# Patient Record
Sex: MALE | Race: WHITE | Employment: OTHER | URBAN - METROPOLITAN AREA
[De-identification: names, ages, dates, MRNs, and addresses within clinical notes are randomized per-mention and may not be internally consistent; named-entity substitution may affect disease eponyms.]

---

## 2017-04-19 ENCOUNTER — OFFICE VISIT (OUTPATIENT)
Dept: FAMILY MEDICINE CLINIC | Age: 72
End: 2017-04-19

## 2017-04-19 VITALS
SYSTOLIC BLOOD PRESSURE: 122 MMHG | HEART RATE: 68 BPM | WEIGHT: 209.5 LBS | BODY MASS INDEX: 29.33 KG/M2 | OXYGEN SATURATION: 94 % | DIASTOLIC BLOOD PRESSURE: 70 MMHG | HEIGHT: 71 IN | RESPIRATION RATE: 12 BRPM | TEMPERATURE: 97.9 F

## 2017-04-19 DIAGNOSIS — S05.02XA CORNEA ABRASION, LEFT, INITIAL ENCOUNTER: Primary | ICD-10-CM

## 2017-04-19 PROCEDURE — 99202 OFFICE O/P NEW SF 15 MIN: CPT | Performed by: NURSE PRACTITIONER

## 2017-04-19 PROCEDURE — G8598 ASA/ANTIPLAT THER USED: HCPCS | Performed by: NURSE PRACTITIONER

## 2017-04-19 PROCEDURE — 3017F COLORECTAL CA SCREEN DOC REV: CPT | Performed by: NURSE PRACTITIONER

## 2017-04-19 PROCEDURE — G8420 CALC BMI NORM PARAMETERS: HCPCS | Performed by: NURSE PRACTITIONER

## 2017-04-19 PROCEDURE — 4004F PT TOBACCO SCREEN RCVD TLK: CPT | Performed by: NURSE PRACTITIONER

## 2017-04-19 PROCEDURE — 4040F PNEUMOC VAC/ADMIN/RCVD: CPT | Performed by: NURSE PRACTITIONER

## 2017-04-19 PROCEDURE — G8427 DOCREV CUR MEDS BY ELIG CLIN: HCPCS | Performed by: NURSE PRACTITIONER

## 2017-04-19 PROCEDURE — 1123F ACP DISCUSS/DSCN MKR DOCD: CPT | Performed by: NURSE PRACTITIONER

## 2017-04-19 RX ORDER — POLYMYXIN B SULFATE AND TRIMETHOPRIM 1; 10000 MG/ML; [USP'U]/ML
1 SOLUTION OPHTHALMIC EVERY 4 HOURS
Qty: 1 BOTTLE | Refills: 0 | Status: SHIPPED | OUTPATIENT
Start: 2017-04-19 | End: 2017-04-26

## 2017-04-22 ASSESSMENT — ENCOUNTER SYMPTOMS
SHORTNESS OF BREATH: 0
EYE ITCHING: 0
EYE DISCHARGE: 0
EYE REDNESS: 1
EYE PAIN: 1
FOREIGN BODY SENSATION: 1
VOMITING: 0
BLURRED VISION: 0
NAUSEA: 0
DOUBLE VISION: 0
PHOTOPHOBIA: 0
WHEEZING: 0
COUGH: 0

## 2020-11-03 PROBLEM — E78.5 HYPERLIPIDEMIA: Status: RESOLVED | Noted: 2020-11-03 | Resolved: 2020-11-03

## 2023-01-16 ENCOUNTER — HOSPITAL ENCOUNTER (OUTPATIENT)
Age: 78
Setting detail: OBSERVATION
Discharge: HOME | End: 2023-01-18
Attending: INTERNAL MEDICINE | Admitting: INTERNAL MEDICINE
Payer: OTHER GOVERNMENT

## 2023-01-16 ENCOUNTER — APPOINTMENT (OUTPATIENT)
Dept: GENERAL RADIOLOGY | Age: 78
End: 2023-01-16
Payer: OTHER GOVERNMENT

## 2023-01-16 DIAGNOSIS — R09.02 HYPOXEMIA: ICD-10-CM

## 2023-01-16 DIAGNOSIS — J10.1 INFLUENZA A: ICD-10-CM

## 2023-01-16 DIAGNOSIS — I50.9 ACUTE ON CHRONIC CONGESTIVE HEART FAILURE, UNSPECIFIED HEART FAILURE TYPE (HCC): Primary | ICD-10-CM

## 2023-01-16 PROBLEM — J11.1 INFLUENZA: Status: ACTIVE | Noted: 2023-01-16

## 2023-01-16 LAB
ALBUMIN SERPL-MCNC: 3.4 G/DL (ref 3.5–4.6)
ALP BLD-CCNC: 52 U/L (ref 35–104)
ALT SERPL-CCNC: 8 U/L (ref 0–41)
ANION GAP SERPL CALCULATED.3IONS-SCNC: 7 MEQ/L (ref 9–15)
AST SERPL-CCNC: 16 U/L (ref 0–40)
BACTERIA: NEGATIVE /HPF
BASOPHILS ABSOLUTE: 0 K/UL (ref 0–0.1)
BASOPHILS RELATIVE PERCENT: 0.5 % (ref 0.2–1.2)
BILIRUB SERPL-MCNC: 0.5 MG/DL (ref 0.2–0.7)
BILIRUBIN URINE: ABNORMAL
BLOOD, URINE: NEGATIVE
BUN BLDV-MCNC: 12 MG/DL (ref 8–23)
CALCIUM SERPL-MCNC: 9.9 MG/DL (ref 8.5–9.9)
CHLORIDE BLD-SCNC: 103 MEQ/L (ref 95–107)
CLARITY: CLEAR
CO2: 29 MEQ/L (ref 20–31)
COLOR: YELLOW
CREAT SERPL-MCNC: 1.07 MG/DL (ref 0.7–1.2)
EOSINOPHILS ABSOLUTE: 0 K/UL (ref 0–0.5)
EOSINOPHILS RELATIVE PERCENT: 0.7 % (ref 0.8–7)
EPITHELIAL CELLS, UA: NORMAL /HPF
GFR SERPL CREATININE-BSD FRML MDRD: >60 ML/MIN/{1.73_M2}
GLOBULIN: 2.8 G/DL (ref 2.3–3.5)
GLUCOSE BLD-MCNC: 100 MG/DL (ref 70–99)
GLUCOSE URINE: 500 MG/DL
HCT VFR BLD CALC: 42.1 % (ref 42–52)
HEMOGLOBIN: 14.2 G/DL (ref 13.7–17.5)
IMMATURE GRANULOCYTES #: 0 K/UL
IMMATURE GRANULOCYTES %: 0.2 %
INFLUENZA A BY PCR: POSITIVE
INFLUENZA B BY PCR: NEGATIVE
KETONES, URINE: ABNORMAL MG/DL
LEUKOCYTE ESTERASE, URINE: NEGATIVE
LYMPHOCYTES ABSOLUTE: 1.2 K/UL (ref 1.3–3.6)
LYMPHOCYTES RELATIVE PERCENT: 20.8 %
MAGNESIUM: 2 MG/DL (ref 1.7–2.4)
MCH RBC QN AUTO: 31.9 PG (ref 25.7–32.2)
MCHC RBC AUTO-ENTMCNC: 33.7 % (ref 32.3–36.5)
MCV RBC AUTO: 94.6 FL (ref 79–92.2)
MONOCYTES ABSOLUTE: 0.3 K/UL (ref 0.3–0.8)
MONOCYTES RELATIVE PERCENT: 5.6 % (ref 5.3–12.2)
NEUTROPHILS ABSOLUTE: 4.2 K/UL (ref 1.8–5.4)
NEUTROPHILS RELATIVE PERCENT: 72.2 % (ref 34–67.9)
NITRITE, URINE: NEGATIVE
PDW BLD-RTO: 14.6 % (ref 11.6–14.4)
PH UA: 6 (ref 5–9)
PLATELET # BLD: 173 K/UL (ref 163–337)
POTASSIUM REFLEX MAGNESIUM: 3.5 MEQ/L (ref 3.4–4.9)
PRO-BNP: 3190 PG/ML
PROCALCITONIN: 0.06 NG/ML (ref 0–0.15)
PROTEIN UA: 30 MG/DL
RBC # BLD: 4.45 M/UL (ref 4.63–6.08)
RBC UA: NORMAL /HPF (ref 0–2)
SARS-COV-2, NAAT: NOT DETECTED
SODIUM BLD-SCNC: 139 MEQ/L (ref 135–144)
SPECIFIC GRAVITY UA: 1.01 (ref 1–1.03)
TOTAL PROTEIN: 6.2 G/DL (ref 6.3–8)
TROPONIN: <0.01 NG/ML (ref 0–0.01)
URINE REFLEX TO CULTURE: ABNORMAL
UROBILINOGEN, URINE: 2 E.U./DL
WBC # BLD: 5.9 K/UL (ref 4.2–9)
WBC UA: NORMAL /HPF (ref 0–5)

## 2023-01-16 PROCEDURE — 96375 TX/PRO/DX INJ NEW DRUG ADDON: CPT

## 2023-01-16 PROCEDURE — 83735 ASSAY OF MAGNESIUM: CPT

## 2023-01-16 PROCEDURE — 87635 SARS-COV-2 COVID-19 AMP PRB: CPT

## 2023-01-16 PROCEDURE — 36415 COLL VENOUS BLD VENIPUNCTURE: CPT

## 2023-01-16 PROCEDURE — 84145 PROCALCITONIN (PCT): CPT

## 2023-01-16 PROCEDURE — 6360000002 HC RX W HCPCS

## 2023-01-16 PROCEDURE — 87502 INFLUENZA DNA AMP PROBE: CPT

## 2023-01-16 PROCEDURE — 2580000003 HC RX 258

## 2023-01-16 PROCEDURE — 99285 EMERGENCY DEPT VISIT HI MDM: CPT

## 2023-01-16 PROCEDURE — G0378 HOSPITAL OBSERVATION PER HR: HCPCS

## 2023-01-16 PROCEDURE — 96365 THER/PROPH/DIAG IV INF INIT: CPT

## 2023-01-16 PROCEDURE — 71046 X-RAY EXAM CHEST 2 VIEWS: CPT

## 2023-01-16 PROCEDURE — 85025 COMPLETE CBC W/AUTO DIFF WBC: CPT

## 2023-01-16 PROCEDURE — 80053 COMPREHEN METABOLIC PANEL: CPT

## 2023-01-16 PROCEDURE — 2580000003 HC RX 258: Performed by: INTERNAL MEDICINE

## 2023-01-16 PROCEDURE — 6370000000 HC RX 637 (ALT 250 FOR IP): Performed by: INTERNAL MEDICINE

## 2023-01-16 PROCEDURE — 83880 ASSAY OF NATRIURETIC PEPTIDE: CPT

## 2023-01-16 PROCEDURE — 96361 HYDRATE IV INFUSION ADD-ON: CPT

## 2023-01-16 PROCEDURE — 6360000002 HC RX W HCPCS: Performed by: INTERNAL MEDICINE

## 2023-01-16 PROCEDURE — 81001 URINALYSIS AUTO W/SCOPE: CPT

## 2023-01-16 PROCEDURE — 93005 ELECTROCARDIOGRAM TRACING: CPT

## 2023-01-16 PROCEDURE — 84484 ASSAY OF TROPONIN QUANT: CPT

## 2023-01-16 PROCEDURE — 87040 BLOOD CULTURE FOR BACTERIA: CPT

## 2023-01-16 RX ORDER — POLYETHYLENE GLYCOL 3350 17 G/17G
17 POWDER, FOR SOLUTION ORAL DAILY PRN
Status: DISCONTINUED | OUTPATIENT
Start: 2023-01-16 | End: 2023-01-18 | Stop reason: HOSPADM

## 2023-01-16 RX ORDER — OSELTAMIVIR PHOSPHATE 75 MG/1
75 CAPSULE ORAL ONCE
Status: DISCONTINUED | OUTPATIENT
Start: 2023-01-16 | End: 2023-01-16

## 2023-01-16 RX ORDER — SODIUM CHLORIDE 9 MG/ML
INJECTION, SOLUTION INTRAVENOUS PRN
Status: DISCONTINUED | OUTPATIENT
Start: 2023-01-16 | End: 2023-01-18 | Stop reason: HOSPADM

## 2023-01-16 RX ORDER — SODIUM CHLORIDE 0.9 % (FLUSH) 0.9 %
10 SYRINGE (ML) INJECTION PRN
Status: DISCONTINUED | OUTPATIENT
Start: 2023-01-16 | End: 2023-01-18 | Stop reason: HOSPADM

## 2023-01-16 RX ORDER — GUAIFENESIN 600 MG/1
600 TABLET, EXTENDED RELEASE ORAL 2 TIMES DAILY
Status: DISCONTINUED | OUTPATIENT
Start: 2023-01-16 | End: 2023-01-18 | Stop reason: HOSPADM

## 2023-01-16 RX ORDER — ASPIRIN 81 MG/1
81 TABLET ORAL DAILY
Status: DISCONTINUED | OUTPATIENT
Start: 2023-01-17 | End: 2023-01-18 | Stop reason: HOSPADM

## 2023-01-16 RX ORDER — FUROSEMIDE 20 MG/1
20 TABLET ORAL DAILY
COMMUNITY

## 2023-01-16 RX ORDER — ATORVASTATIN CALCIUM 40 MG/1
40 TABLET, FILM COATED ORAL
Status: DISCONTINUED | OUTPATIENT
Start: 2023-01-16 | End: 2023-01-18 | Stop reason: HOSPADM

## 2023-01-16 RX ORDER — INSULIN LISPRO 100 [IU]/ML
0-8 INJECTION, SOLUTION INTRAVENOUS; SUBCUTANEOUS
Status: DISCONTINUED | OUTPATIENT
Start: 2023-01-17 | End: 2023-01-18 | Stop reason: HOSPADM

## 2023-01-16 RX ORDER — PANTOPRAZOLE SODIUM 40 MG/1
40 TABLET, DELAYED RELEASE ORAL
Status: DISCONTINUED | OUTPATIENT
Start: 2023-01-17 | End: 2023-01-18 | Stop reason: HOSPADM

## 2023-01-16 RX ORDER — ACETAMINOPHEN 160 MG
TABLET,DISINTEGRATING ORAL
COMMUNITY

## 2023-01-16 RX ORDER — ALBUTEROL SULFATE 90 UG/1
INHALANT RESPIRATORY (INHALATION)
COMMUNITY

## 2023-01-16 RX ORDER — IPRATROPIUM BROMIDE AND ALBUTEROL SULFATE 2.5; .5 MG/3ML; MG/3ML
1 SOLUTION RESPIRATORY (INHALATION) 3 TIMES DAILY
Status: DISCONTINUED | OUTPATIENT
Start: 2023-01-16 | End: 2023-01-16

## 2023-01-16 RX ORDER — FUROSEMIDE 10 MG/ML
20 INJECTION INTRAMUSCULAR; INTRAVENOUS ONCE
Status: COMPLETED | OUTPATIENT
Start: 2023-01-16 | End: 2023-01-16

## 2023-01-16 RX ORDER — SPIRONOLACTONE 25 MG/1
25 TABLET ORAL DAILY
Status: DISCONTINUED | OUTPATIENT
Start: 2023-01-17 | End: 2023-01-18 | Stop reason: HOSPADM

## 2023-01-16 RX ORDER — ACETAMINOPHEN 325 MG/1
650 TABLET ORAL EVERY 6 HOURS PRN
Status: DISCONTINUED | OUTPATIENT
Start: 2023-01-16 | End: 2023-01-18 | Stop reason: HOSPADM

## 2023-01-16 RX ORDER — ENOXAPARIN SODIUM 100 MG/ML
40 INJECTION SUBCUTANEOUS DAILY
Status: DISCONTINUED | OUTPATIENT
Start: 2023-01-17 | End: 2023-01-17

## 2023-01-16 RX ORDER — SPIRONOLACTONE 25 MG/1
25 TABLET ORAL DAILY
COMMUNITY

## 2023-01-16 RX ORDER — ACETAMINOPHEN 650 MG/1
650 SUPPOSITORY RECTAL EVERY 6 HOURS PRN
Status: DISCONTINUED | OUTPATIENT
Start: 2023-01-16 | End: 2023-01-18 | Stop reason: HOSPADM

## 2023-01-16 RX ORDER — DEXTROSE MONOHYDRATE 100 MG/ML
INJECTION, SOLUTION INTRAVENOUS CONTINUOUS PRN
Status: DISCONTINUED | OUTPATIENT
Start: 2023-01-16 | End: 2023-01-18 | Stop reason: HOSPADM

## 2023-01-16 RX ORDER — ATORVASTATIN CALCIUM 40 MG/1
40 TABLET, FILM COATED ORAL DAILY
COMMUNITY

## 2023-01-16 RX ORDER — CARVEDILOL 25 MG/1
25 TABLET ORAL 2 TIMES DAILY WITH MEALS
Status: ON HOLD | COMMUNITY
End: 2023-01-18 | Stop reason: SDUPTHER

## 2023-01-16 RX ORDER — ALENDRONATE SODIUM 70 MG/1
70 TABLET ORAL
COMMUNITY

## 2023-01-16 RX ORDER — IPRATROPIUM BROMIDE AND ALBUTEROL SULFATE 2.5; .5 MG/3ML; MG/3ML
1 SOLUTION RESPIRATORY (INHALATION) 3 TIMES DAILY
Status: DISCONTINUED | OUTPATIENT
Start: 2023-01-16 | End: 2023-01-18 | Stop reason: HOSPADM

## 2023-01-16 RX ORDER — 0.9 % SODIUM CHLORIDE 0.9 %
1000 INTRAVENOUS SOLUTION INTRAVENOUS ONCE
Status: DISCONTINUED | OUTPATIENT
Start: 2023-01-16 | End: 2023-01-16

## 2023-01-16 RX ORDER — SILDENAFIL 100 MG/1
100 TABLET, FILM COATED ORAL PRN
COMMUNITY

## 2023-01-16 RX ORDER — INSULIN LISPRO 100 [IU]/ML
0-4 INJECTION, SOLUTION INTRAVENOUS; SUBCUTANEOUS NIGHTLY
Status: DISCONTINUED | OUTPATIENT
Start: 2023-01-16 | End: 2023-01-18 | Stop reason: HOSPADM

## 2023-01-16 RX ORDER — ONDANSETRON 2 MG/ML
4 INJECTION INTRAMUSCULAR; INTRAVENOUS EVERY 6 HOURS PRN
Status: DISCONTINUED | OUTPATIENT
Start: 2023-01-16 | End: 2023-01-18 | Stop reason: HOSPADM

## 2023-01-16 RX ORDER — OSELTAMIVIR PHOSPHATE 75 MG/1
75 CAPSULE ORAL 2 TIMES DAILY
Status: DISCONTINUED | OUTPATIENT
Start: 2023-01-16 | End: 2023-01-18 | Stop reason: HOSPADM

## 2023-01-16 RX ORDER — METHYLPREDNISOLONE SODIUM SUCCINATE 40 MG/ML
40 INJECTION, POWDER, LYOPHILIZED, FOR SOLUTION INTRAMUSCULAR; INTRAVENOUS EVERY 12 HOURS
Status: DISCONTINUED | OUTPATIENT
Start: 2023-01-16 | End: 2023-01-18 | Stop reason: HOSPADM

## 2023-01-16 RX ORDER — ASPIRIN 81 MG/1
81 TABLET ORAL DAILY
COMMUNITY

## 2023-01-16 RX ORDER — PROMETHAZINE HYDROCHLORIDE 12.5 MG/1
12.5 TABLET ORAL EVERY 6 HOURS PRN
Status: DISCONTINUED | OUTPATIENT
Start: 2023-01-16 | End: 2023-01-18 | Stop reason: HOSPADM

## 2023-01-16 RX ORDER — SODIUM CHLORIDE 0.9 % (FLUSH) 0.9 %
10 SYRINGE (ML) INJECTION EVERY 12 HOURS SCHEDULED
Status: DISCONTINUED | OUTPATIENT
Start: 2023-01-16 | End: 2023-01-18 | Stop reason: HOSPADM

## 2023-01-16 RX ORDER — CARVEDILOL 12.5 MG/1
12.5 TABLET ORAL 2 TIMES DAILY WITH MEALS
Status: DISCONTINUED | OUTPATIENT
Start: 2023-01-17 | End: 2023-01-18 | Stop reason: HOSPADM

## 2023-01-16 RX ADMIN — OSELTAMIVIR PHOSPHATE 75 MG: 75 CAPSULE ORAL at 22:18

## 2023-01-16 RX ADMIN — SODIUM CHLORIDE, PRESERVATIVE FREE 10 ML: 5 INJECTION INTRAVENOUS at 22:18

## 2023-01-16 RX ADMIN — SODIUM CHLORIDE 1000 ML: 9 INJECTION, SOLUTION INTRAVENOUS at 18:30

## 2023-01-16 RX ADMIN — ATORVASTATIN CALCIUM 40 MG: 40 TABLET, FILM COATED ORAL at 22:18

## 2023-01-16 RX ADMIN — METHYLPREDNISOLONE SODIUM SUCCINATE 40 MG: 40 INJECTION, POWDER, FOR SOLUTION INTRAMUSCULAR; INTRAVENOUS at 22:18

## 2023-01-16 RX ADMIN — CEFTRIAXONE 1000 MG: 1 INJECTION, POWDER, FOR SOLUTION INTRAMUSCULAR; INTRAVENOUS at 20:26

## 2023-01-16 RX ADMIN — GUAIFENESIN 600 MG: 600 TABLET, EXTENDED RELEASE ORAL at 22:18

## 2023-01-16 RX ADMIN — FUROSEMIDE 20 MG: 10 INJECTION, SOLUTION INTRAMUSCULAR; INTRAVENOUS at 20:25

## 2023-01-16 ASSESSMENT — ENCOUNTER SYMPTOMS
CHOKING: 0
DIARRHEA: 0
COUGH: 0
NAUSEA: 0
ABDOMINAL PAIN: 0
SHORTNESS OF BREATH: 0
ABDOMINAL DISTENTION: 0
SORE THROAT: 0
RHINORRHEA: 0
EYES NEGATIVE: 1
VOMITING: 0

## 2023-01-16 ASSESSMENT — PAIN DESCRIPTION - PAIN TYPE: TYPE: ACUTE PAIN

## 2023-01-16 ASSESSMENT — PAIN - FUNCTIONAL ASSESSMENT: PAIN_FUNCTIONAL_ASSESSMENT: NONE - DENIES PAIN

## 2023-01-16 NOTE — ED PROVIDER NOTES
[facility] ED  eMERGENCYdEPARTMENT eNCOUnter      Pt Name: Jimenez French  MRN: [id number]  [place] 1945  Date of evaluation: 1/16/2023  Ulla Bosworth, PA-C    CHIEF COMPLAINT           HPI  Jimenez French is a 70 y.o. male per chart review has a h/o MI s/p CABG, COPD, smoker, CAD, HTN presents to the ED with concerns for increased fatigue and worsening cough x1 week. Patient is alert and oriented however wife helps give history. She states they went to [place] last week, on a plane, he was sick while there with cough, nasal congestion. Started to improve however when he got back he has worsened. She states he is sleeping all day, lacking an appetite. States today he thought people were in the house when they were not. He denies any concerning symptoms, states his family is too worried about him. States his cough is at his baseline due to his COPD. Never on any O2 at home. He denies any dysuria, changes in bladder or bowel movements, abdominal pain, flank pain, headache, vision changes, weakness or dizziness. ROS  Review of Systems   Constitutional:  Positive for activity change, appetite change and fatigue. Negative for chills and fever. HENT:  Negative for congestion, rhinorrhea, sneezing and sore throat. Eyes: Negative. Respiratory:  Negative for cough, choking and shortness of breath. Cardiovascular:  Negative for chest pain. Gastrointestinal:  Negative for abdominal distention, abdominal pain, diarrhea, nausea and vomiting. Endocrine: Negative. Genitourinary:  Negative for dysuria, flank pain, hematuria and urgency. Musculoskeletal: Negative. Skin: Negative. Neurological:  Negative for tremors, syncope, facial asymmetry, speech difficulty, weakness, light-headedness, numbness and headaches. Hematological: Negative. Except as noted above the remainder of the review of systems was reviewed and negative.        PAST MEDICAL HISTORY     Past Medical History: Diagnosis Date    Acute transmural MI ([facility] [address] 3.1.07    MRI, basal, mid and distal inferior w/inferoseptal wall involvement extending to the rt ventricular posterior wall.h/o cardiogenic shock. Anginal pain (HCC)     controlled w/beta blockers and Ranexa    ASHD (arteriosclerotic heart disease)     class II    CAD (coronary artery disease)     Cardiogenic shock (HCC)     Carotid artery disease (HCC)     Carotid artery stenosis     Carotid bruit     COPD (chronic obstructive pulmonary disease) (HCC)     Dyslipidemia     H/O agent [place] exposure     as reported by the [place]    H/O pericarditis     H/O thrombocytopenia     Hyperlipidemia     Hypertension     Ischemic cardiomyopathy     Left bundle branch block     chronic,    MI (myocardial infarction) ([facility] [address]     remote    Other dyspnea and respiratory abnormality     Pericarditis     Renal failure          SURGICAL HISTORY       Past Surgical History:   Procedure Laterality Date    ANGIOPLASTY      w/occluded distal vessels and stent placement    CARDIAC CATHETERIZATION      emergent, 100 occluded distal RCA w/ unsuccessful PTCA. Ptca w/BMS x 2 to the MID RCA and PTCA to the PDA w/BMS x 12. 1.07    SKIN BIOPSY Left 04/14/2017         CURRENTMEDICATIONS       Previous Medications    ALBUTEROL (PROAIR HFA) 108 (90 BASE) MCG/ACT INHALER    Inhale 2 puffs into the lungs See Admin Instructions.  2 puffs every 6 hours     ALBUTEROL SULFATE, SENSOR, (PROAIR DIGIHALER) 108 (90 BASE) MCG/ACT AEPB    Inhale into the lungs    ALENDRONATE (FOSAMAX) 70 MG TABLET    Take 70 mg by mouth every 7 days    ASPIRIN 81 MG EC TABLET    Take 81 mg by mouth daily    ATORVASTATIN (LIPITOR) 40 MG TABLET    Take 40 mg by mouth daily    CARVEDILOL (COREG) 25 MG TABLET    Take 25 mg by mouth 2 times daily (with meals)    CHOLECALCIFEROL (VITAMIN D3) 50 MCG (2000 UT) CAPS    Take by mouth    CYANOCOBALAMIN 1000 MCG TABLET    Take 1,000 mcg by mouth daily    EMPAGLIFLOZIN (JARDIANCE) 25 MG TABLET Take 25 mg by mouth daily    FUROSEMIDE (LASIX) 20 MG TABLET    Take 20 mg by mouth daily    LISINOPRIL (PRINIVIL;ZESTRIL) 5 MG TABLET    Take 5 mg by mouth daily. NITROGLYCERIN (NITROSTAT) 0.4 MG SL TABLET    Place 0.4 mg under the tongue every 5 minutes as needed. OMEPRAZOLE (PRILOSEC) 20 MG CAPSULE    Take 20 mg by mouth daily. SACUBITRIL-VALSARTAN (ENTRESTO) 24-26 MG PER TABLET    Take 1 tablet by mouth 2 times daily    SILDENAFIL (VIAGRA) 100 MG TABLET    Take 100 mg by mouth as needed for Erectile Dysfunction    SPIRONOLACTONE (ALDACTONE) 25 MG TABLET    Take 25 mg by mouth daily    TIOTROPIUM-OLODATEROL (STIOLTO) 2.5-2.5 MCG/ACT AERS    Inhale 2 puffs into the lungs daily       ALLERGIES     Cat hair extract    FAMILY HISTORY       Family History   Problem Relation Age of Onset    Heart Attack Mother     Heart Attack Father           SOCIAL HISTORY       Social History     Socioeconomic History    Marital status:      Spouse name: None    Number of children: None    Years of education: None    Highest education level: None   Tobacco Use    Smoking status: Every Day     Packs/day: 0.75     Years: 55.00     Pack years: 41.25     Types: Cigarettes     Start date: 4/19/1962    Smokeless tobacco: Never   Substance and Sexual Activity    Alcohol use: Not Currently    Drug use: No         PHYSICAL EXAM       ED Triage Vitals [01/16/23 1756]   BP Temp Temp Source Heart Rate Resp SpO2 Height Weight   126/76 98.2 °F (36.8 °C) Oral 57 18 92 % 5' 11\" (1.803 m) 181 lb (82.1 kg)       Physical Exam  Constitutional:       General: He is not in acute distress. Appearance: Normal appearance. He is not ill-appearing, toxic-appearing or diaphoretic. HENT:      Head: Normocephalic and atraumatic. Right Ear: Tympanic membrane, ear canal and external ear normal.      Left Ear: Tympanic membrane, ear canal and external ear normal.      Nose: Congestion present. No rhinorrhea.       Mouth/Throat: Mouth: Mucous membranes are moist.      Pharynx: Oropharynx is clear. No oropharyngeal exudate or posterior oropharyngeal erythema. Eyes:      Extraocular Movements: Extraocular movements intact. Conjunctiva/sclera: Conjunctivae normal.      Pupils: Pupils are equal, round, and reactive to light. Cardiovascular:      Rate and Rhythm: Normal rate and regular rhythm. Pulmonary:      Effort: Pulmonary effort is normal. No respiratory distress. Breath sounds: No stridor. Examination of the right-lower field reveals decreased breath sounds. Examination of the left-lower field reveals decreased breath sounds. Decreased breath sounds present. No wheezing, rhonchi or rales. Chest:      Chest wall: No tenderness. Abdominal:      General: Abdomen is flat. There is no distension. Palpations: Abdomen is soft. Tenderness: There is no abdominal tenderness. There is no right CVA tenderness, left CVA tenderness, guarding or rebound. Musculoskeletal:         General: Normal range of motion. Cervical back: Normal range of motion and neck supple. No rigidity or tenderness. Right lower leg: No edema. Left lower leg: No edema. Lymphadenopathy:      Cervical: No cervical adenopathy. Skin:     General: Skin is warm and dry. Capillary Refill: Capillary refill takes 2 to 3 seconds. Coloration: Skin is not jaundiced or pale. Findings: No bruising, erythema, lesion or rash. Neurological:      General: No focal deficit present. Mental Status: He is alert and oriented to person, place, and time. Mental status is at baseline. Cranial Nerves: No cranial nerve deficit. Sensory: No sensory deficit. Motor: No weakness. Gait: Gait normal.         MDM    72-year-old male presents to the ED with complaints of fatigue, cough x1 week. Traveled on a plane last week, got sick, however has not improved.   Wife states he has been sleeping more than normal, not acting himself. He is currently afebrile with a temp of 98.2, hemodynamically stable. /76. O2 on arrival to ED is 92%. Hx of COPD, smoker. Never on supplemental oxygen. He is alert and oriented. Nontoxic, not in distress. On exam he has decreased breath sounds in lower lung fields bilaterally. No wheezing or rhonchi noted. Audible nasal congestion and productive cough. No excessive work with breathing, patient states this is his baseline. Negative abdominal exam.  No neurologic deficits. Began IV NS in the ED. Influenza positive. No leukocytosis. Troponin WNL. BNP 3,190. CXR shows focal infiltrate in the right lower lung. Given BNP and hx of CHF fluids stopped immediately. Began IV Lasix and Rocephin to cover in case of secondary PNA on top of influenza. EKG shows sinus va with HR 64 with frequent PVCs, normal axis, Qtc 422 ms, no acute ST changes. Discussed case with Dr. Cheikh Holm (hospitalist) and patient will be admitted to floor. FINAL IMPRESSION      1. Acute on chronic congestive heart failure, unspecified heart failure type ([address]    2. Influenza A    3.  Hypoxemia          DISPOSITION/PLAN   DISPOSITION Decision To Admit 01/16/2023 08:37:16 PM        DISCHARGE MEDICATIONS:  [unfilled]         Estela Corrales PA-C(electronically signed)  Attending Emergency Physician           Estela Corrales PA-C  01/17/23 21

## 2023-01-17 LAB
ALBUMIN SERPL-MCNC: 3.6 G/DL (ref 3.5–4.6)
ALP BLD-CCNC: 49 U/L (ref 35–104)
ALT SERPL-CCNC: 7 U/L (ref 0–41)
ANION GAP SERPL CALCULATED.3IONS-SCNC: 10 MEQ/L (ref 9–15)
AST SERPL-CCNC: 11 U/L (ref 0–40)
BASOPHILS ABSOLUTE: 0 K/UL (ref 0–0.1)
BASOPHILS RELATIVE PERCENT: 0.2 % (ref 0.2–1.2)
BILIRUB SERPL-MCNC: 0.6 MG/DL (ref 0.2–0.7)
BUN BLDV-MCNC: 11 MG/DL (ref 8–23)
CALCIUM SERPL-MCNC: 10.1 MG/DL (ref 8.5–9.9)
CHLORIDE BLD-SCNC: 103 MEQ/L (ref 95–107)
CO2: 28 MEQ/L (ref 20–31)
CREAT SERPL-MCNC: 1.03 MG/DL (ref 0.7–1.2)
EKG ATRIAL RATE: 76 BPM
EKG ATRIAL RATE: 94 BPM
EKG Q-T INTERVAL: 344 MS
EKG Q-T INTERVAL: 410 MS
EKG QRS DURATION: 92 MS
EKG QRS DURATION: 94 MS
EKG QTC CALCULATION (BAZETT): 422 MS
EKG QTC CALCULATION (BAZETT): 432 MS
EKG R AXIS: 66 DEGREES
EKG R AXIS: 92 DEGREES
EKG T AXIS: -61 DEGREES
EKG T AXIS: -71 DEGREES
EKG VENTRICULAR RATE: 64 BPM
EKG VENTRICULAR RATE: 95 BPM
EOSINOPHILS ABSOLUTE: 0 K/UL (ref 0–0.5)
EOSINOPHILS RELATIVE PERCENT: 0.2 % (ref 0.8–7)
GFR SERPL CREATININE-BSD FRML MDRD: >60 ML/MIN/{1.73_M2}
GLOBULIN: 2.9 G/DL (ref 2.3–3.5)
GLUCOSE BLD-MCNC: 109 MG/DL (ref 70–99)
GLUCOSE BLD-MCNC: 121 MG/DL (ref 70–99)
GLUCOSE BLD-MCNC: 123 MG/DL (ref 70–99)
GLUCOSE BLD-MCNC: 156 MG/DL (ref 70–99)
GLUCOSE BLD-MCNC: 216 MG/DL (ref 70–99)
HBA1C MFR BLD: 5.4 % (ref 4.8–5.9)
HCT VFR BLD CALC: 44.5 % (ref 42–52)
HEMOGLOBIN: 14.8 G/DL (ref 13.7–17.5)
IMMATURE GRANULOCYTES #: 0 K/UL
IMMATURE GRANULOCYTES %: 0.4 %
LV EF: 50 %
LVEF MODALITY: NORMAL
LYMPHOCYTES ABSOLUTE: 0.6 K/UL (ref 1.3–3.6)
LYMPHOCYTES RELATIVE PERCENT: 10.5 %
MCH RBC QN AUTO: 31 PG (ref 25.7–32.2)
MCHC RBC AUTO-ENTMCNC: 33.3 % (ref 32.3–36.5)
MCV RBC AUTO: 93.3 FL (ref 79–92.2)
MONOCYTES ABSOLUTE: 0.1 K/UL (ref 0.3–0.8)
MONOCYTES RELATIVE PERCENT: 1.6 % (ref 5.3–12.2)
NEUTROPHILS ABSOLUTE: 4.8 K/UL (ref 1.8–5.4)
NEUTROPHILS RELATIVE PERCENT: 87.1 % (ref 34–67.9)
PDW BLD-RTO: 14.5 % (ref 11.6–14.4)
PERFORMED ON: ABNORMAL
PLATELET # BLD: 192 K/UL (ref 163–337)
POTASSIUM REFLEX MAGNESIUM: 3.7 MEQ/L (ref 3.4–4.9)
RBC # BLD: 4.77 M/UL (ref 4.63–6.08)
SODIUM BLD-SCNC: 141 MEQ/L (ref 135–144)
TOTAL PROTEIN: 6.5 G/DL (ref 6.3–8)
TROPONIN: <0.01 NG/ML (ref 0–0.01)
WBC # BLD: 5.5 K/UL (ref 4.2–9)

## 2023-01-17 PROCEDURE — 96376 TX/PRO/DX INJ SAME DRUG ADON: CPT

## 2023-01-17 PROCEDURE — 84484 ASSAY OF TROPONIN QUANT: CPT

## 2023-01-17 PROCEDURE — 83036 HEMOGLOBIN GLYCOSYLATED A1C: CPT

## 2023-01-17 PROCEDURE — 99223 1ST HOSP IP/OBS HIGH 75: CPT | Performed by: INTERNAL MEDICINE

## 2023-01-17 PROCEDURE — 96367 TX/PROPH/DG ADDL SEQ IV INF: CPT

## 2023-01-17 PROCEDURE — G0378 HOSPITAL OBSERVATION PER HR: HCPCS

## 2023-01-17 PROCEDURE — 6370000000 HC RX 637 (ALT 250 FOR IP): Performed by: INTERNAL MEDICINE

## 2023-01-17 PROCEDURE — 96366 THER/PROPH/DIAG IV INF ADDON: CPT

## 2023-01-17 PROCEDURE — 93005 ELECTROCARDIOGRAM TRACING: CPT

## 2023-01-17 PROCEDURE — 80053 COMPREHEN METABOLIC PANEL: CPT

## 2023-01-17 PROCEDURE — 93306 TTE W/DOPPLER COMPLETE: CPT

## 2023-01-17 PROCEDURE — 6360000002 HC RX W HCPCS: Performed by: INTERNAL MEDICINE

## 2023-01-17 PROCEDURE — 2580000003 HC RX 258: Performed by: INTERNAL MEDICINE

## 2023-01-17 PROCEDURE — 36415 COLL VENOUS BLD VENIPUNCTURE: CPT

## 2023-01-17 PROCEDURE — 85025 COMPLETE CBC W/AUTO DIFF WBC: CPT

## 2023-01-17 PROCEDURE — 2500000003 HC RX 250 WO HCPCS: Performed by: INTERNAL MEDICINE

## 2023-01-17 RX ORDER — ENOXAPARIN SODIUM 100 MG/ML
1 INJECTION SUBCUTANEOUS DAILY
Status: DISCONTINUED | OUTPATIENT
Start: 2023-01-17 | End: 2023-01-18 | Stop reason: HOSPADM

## 2023-01-17 RX ADMIN — OSELTAMIVIR PHOSPHATE 75 MG: 75 CAPSULE ORAL at 08:17

## 2023-01-17 RX ADMIN — SPIRONOLACTONE 25 MG: 25 TABLET ORAL at 08:19

## 2023-01-17 RX ADMIN — EMPAGLIFLOZIN 25 MG: 10 TABLET, FILM COATED ORAL at 08:18

## 2023-01-17 RX ADMIN — SACUBITRIL AND VALSARTAN 0.5 TABLET: 24; 26 TABLET, FILM COATED ORAL at 20:51

## 2023-01-17 RX ADMIN — PANTOPRAZOLE SODIUM 40 MG: 40 TABLET, DELAYED RELEASE ORAL at 06:09

## 2023-01-17 RX ADMIN — CARVEDILOL 12.5 MG: 12.5 TABLET, FILM COATED ORAL at 17:00

## 2023-01-17 RX ADMIN — ATORVASTATIN CALCIUM 40 MG: 40 TABLET, FILM COATED ORAL at 20:51

## 2023-01-17 RX ADMIN — SODIUM CHLORIDE, PRESERVATIVE FREE 10 ML: 5 INJECTION INTRAVENOUS at 20:54

## 2023-01-17 RX ADMIN — GUAIFENESIN 600 MG: 600 TABLET, EXTENDED RELEASE ORAL at 20:51

## 2023-01-17 RX ADMIN — CEFTRIAXONE 1000 MG: 1 INJECTION, POWDER, FOR SOLUTION INTRAMUSCULAR; INTRAVENOUS at 21:09

## 2023-01-17 RX ADMIN — OSELTAMIVIR PHOSPHATE 75 MG: 75 CAPSULE ORAL at 20:51

## 2023-01-17 RX ADMIN — METHYLPREDNISOLONE SODIUM SUCCINATE 40 MG: 40 INJECTION, POWDER, FOR SOLUTION INTRAMUSCULAR; INTRAVENOUS at 20:51

## 2023-01-17 RX ADMIN — DOXYCYCLINE 100 MG: 100 INJECTION, POWDER, LYOPHILIZED, FOR SOLUTION INTRAVENOUS at 12:14

## 2023-01-17 RX ADMIN — ASPIRIN 81 MG: 81 TABLET, COATED ORAL at 08:17

## 2023-01-17 RX ADMIN — METHYLPREDNISOLONE SODIUM SUCCINATE 40 MG: 40 INJECTION, POWDER, FOR SOLUTION INTRAMUSCULAR; INTRAVENOUS at 12:00

## 2023-01-17 RX ADMIN — CARVEDILOL 12.5 MG: 12.5 TABLET, FILM COATED ORAL at 08:17

## 2023-01-17 RX ADMIN — SODIUM CHLORIDE, PRESERVATIVE FREE 10 ML: 5 INJECTION INTRAVENOUS at 12:01

## 2023-01-17 RX ADMIN — GUAIFENESIN 600 MG: 600 TABLET, EXTENDED RELEASE ORAL at 08:19

## 2023-01-17 RX ADMIN — SACUBITRIL AND VALSARTAN 0.5 TABLET: 24; 26 TABLET, FILM COATED ORAL at 08:19

## 2023-01-17 ASSESSMENT — ENCOUNTER SYMPTOMS
GASTROINTESTINAL NEGATIVE: 1
SHORTNESS OF BREATH: 1
EYES NEGATIVE: 1
WHEEZING: 0
ALLERGIC/IMMUNOLOGIC NEGATIVE: 1
ABDOMINAL PAIN: 0
VOMITING: 0
NAUSEA: 0

## 2023-01-17 NOTE — PROGRESS NOTES
Pt admitted to room 246 with Influenza. Pt a and o x4. [place]. Pt grumpy about being admitted but is agreeable to stay. Home med complete. Pt DOES have a DNR CCA form that was completed and signed by ED doc. Per pt Intubation-ok. No CPR. Pt allergies reviewed. Per wife and pt , pt can not have any blood thinners besides baby aspirin. Per pt last time a blood thinner was taken pt had \"internal bleeding\". Will pass on so doc can dc in AM. Head to toe complete. Trace BLE edema noted. No  other issue. Pt on droplet precautions d/t flu. Pt up independent in room. Non skid socks provided. Does well. Pt initially was 88-90 % on RA. Pt denies SOB. Pt placed on  2LNC and was 96%. Denies pain. Fresh ice water given. On tele. Call light in reach.

## 2023-01-17 NOTE — PROGRESS NOTES
Pt is A and O x4. Pt put own home clothes on. Pt stated he's ready to go home and he's leaving today no matter what. Pt is fully dressed and has shoes on. Pt states he \"feels fine\". O2 on RA is 90%. Pt denies feeling SOB and refused to put back on oxygen. Pt also removed tele again and refuses to put it on. Pt laying in bed on cell phone waiting for breakfast to come. Pt stated after breakfast his wife is supposed to come and he is leaving no matter what. Refused any further education about tx or any procedures. Pt is pleasant but adamant about leaving.

## 2023-01-17 NOTE — PROGRESS NOTES
Pt flipped into A-fib on monitor. Rate 80-120s . Pts vitals stable. Asymptomatic. Per pt he states he has history of it. Although not listed in Epic. EKG done and in Epic. Dr notified. New orders received for Lovenox increased. This nurse informed doc of bleeding with blood thinners and pt refusal. I informed pt of risks and benefits. Pt still refused.

## 2023-01-17 NOTE — H&P
Hospital Medicine  History and Physical    Patient:  Tarah Sun  MRN: [id number]    CHIEF COMPLAINT:    Chief Complaint   Patient presents with    Other     Pt is sleeping a lot, not eating or drinking. Denies n/v/d. Coughing more. Recently travel out of state. History Obtained From:  Patient, EMR  Primary Care Physician: Jovanny Kilpatrick MD    HISTORY OF PRESENT ILLNESS:   The patient is a 70 y.o. male with PMH of cardiomyopathy. The patient came in with cough, congestion and wheezing. He tested positive for influenza. His BNP was elevated. His troponin is negative. I had accepted to admit patient to the medical floor for further evaluation. In the middle of the night that the patient went into A. fib. Heart rate is in the 70s. I ordered the Lovenox 1 mg/kg twice a day given his elevated chads score. Patient did not allow the nurse to inject Lovenox. In the middle of the night patient took his Holter monitor in office. He told the nurse that he is leaving by 9 AM.  He is not willing to stay any longer. Patient is feeling better compared to yesterday. Diminished cough. No chest pain palpitation. No shortness of breath or dyspnea on exertion. Past Medical History:      Diagnosis Date    Acute transmural MI ([facility] [address] 3.1.07    MRI, basal, mid and distal inferior w/inferoseptal wall involvement extending to the rt ventricular posterior wall.h/o cardiogenic shock.     Anginal pain (HCC)     controlled w/beta blockers and Ranexa    ASHD (arteriosclerotic heart disease)     class II    CAD (coronary artery disease)     Cardiogenic shock (HCC)     Carotid artery disease (HCC)     Carotid artery stenosis     Carotid bruit     COPD (chronic obstructive pulmonary disease) (HCC)     Dyslipidemia     H/O agent [place] exposure     as reported by the [place]    H/O pericarditis     H/O thrombocytopenia     Hyperlipidemia     Hypertension     Ischemic cardiomyopathy     Left bundle branch block     chronic,    MI (myocardial infarction) ([facility] [address]     remote    Other dyspnea and respiratory abnormality     Pericarditis     Renal failure        Past Surgical History:      Procedure Laterality Date    ANGIOPLASTY      w/occluded distal vessels and stent placement    CARDIAC CATHETERIZATION      emergent, 100 occluded distal RCA w/ unsuccessful PTCA. Ptca w/BMS x 2 to the MID RCA and PTCA to the PDA w/BMS x 12. 1.07    SKIN BIOPSY Left 04/14/2017       Medications Prior to Admission:    Prior to Admission medications    Medication Sig Start Date End Date Taking?  Authorizing Provider   sildenafil (VIAGRA) 100 MG tablet Take 100 mg by mouth as needed for Erectile Dysfunction   Yes Historical Provider, MD   furosemide (LASIX) 20 MG tablet Take 20 mg by mouth daily   Yes Historical Provider, MD   carvedilol (COREG) 25 MG tablet Take 25 mg by mouth 2 times daily (with meals)   Yes Historical Provider, MD   spironolactone (ALDACTONE) 25 MG tablet Take 25 mg by mouth daily   Yes Historical Provider, MD   atorvastatin (LIPITOR) 40 MG tablet Take 40 mg by mouth daily   Yes Historical Provider, MD   empagliflozin (JARDIANCE) 25 MG tablet Take 25 mg by mouth daily   Yes Historical Provider, MD   sacubitril-valsartan (ENTRESTO) 24-26 MG per tablet Take 1 tablet by mouth 2 times daily   Yes Historical Provider, MD   aspirin 81 MG EC tablet Take 81 mg by mouth daily   Yes Historical Provider, MD   cyanocobalamin 1000 MCG tablet Take 1,000 mcg by mouth daily   Yes Historical Provider, MD   Cholecalciferol (VITAMIN D3) 50 MCG (2000 UT) CAPS Take by mouth   Yes Historical Provider, MD   alendronate (FOSAMAX) 70 MG tablet Take 70 mg by mouth every 7 days   Yes Historical Provider, MD   tiotropium-olodaterol (STIOLTO) 2.5-2.5 MCG/ACT AERS Inhale 2 puffs into the lungs daily   Yes Historical Provider, MD   Albuterol Sulfate, sensor, (PROAIR DIGIHALER) 108 (90 Base) MCG/ACT AEPB Inhale into the lungs   Yes Historical Provider, MD   albuterol sulfate HFA (PROVENTIL;VENTOLIN;PROAIR) 108 (90 Base) MCG/ACT inhaler Inhale 2 puffs into the lungs See Admin Instructions. 2 puffs every 6 hours     Historical Provider, MD   nitroGLYCERIN (NITROSTAT) 0.4 MG SL tablet Place 0.4 mg under the tongue every 5 minutes as needed. Historical Provider, MD   lisinopril (PRINIVIL;ZESTRIL) 5 MG tablet Take 5 mg by mouth daily. Historical Provider, MD   omeprazole (PRILOSEC) 20 MG capsule Take 20 mg by mouth daily. Historical Provider, MD       Allergies:  Cat hair extract, Heparin, Lovenox [enoxaparin], and Plavix [clopidogrel]    Social History:   TOBACCO:   reports that he has been smoking cigarettes. He started smoking about 60 years ago. He has a 41.25 pack-year smoking history. He has never used smokeless tobacco.  ETOH:   reports that he does not currently use alcohol. Family History:       Problem Relation Age of Onset    Heart Attack Mother     Heart Attack Father        REVIEW OF SYSTEMS:  Ten systems reviewed and negative except for stated in HPI    Physical Exam:    Vitals: /79   Pulse 84   Temp 98.1 °F (36.7 °C) (Oral)   Resp 18   Ht 5' 11\" (1.803 m)   Wt 180 lb (81.6 kg)   SpO2 91%   BMI 25.10 kg/m²   General appearance: alert, appears stated age and cooperative, no apparent distress. Skin: Skin color, texture, turgor normal. No rashes or lesions  HEENT: Head: Normocephalic, no lesions, without obvious abnormality. Neck: no adenopathy, no carotid bruit, no JVD, supple, symmetrical, trachea midline, and thyroid not enlarged, symmetric, no tenderness/mass/nodules  Lungs:    Expiratory wheezing mostly on the right side.   Rhonchi in the right mid lung  Heart: irregularly irregular rhythm  Abdomen: soft, non-tender; bowel sounds normal; no masses,  no organomegaly  Extremities: extremities normal, atraumatic, no cyanosis or edema  Neurologic: Mental status: Alert, oriented, thought content appropriate     Recent Labs     01/16/23  2223 01/17/23  0531   WBC 5.9 5.5   HGB 14.2 14.8    192     Recent Labs     01/16/23  1843 01/17/23  0531    141   K 3.5 3.7    103   CO2 29 28   BUN 12 11   CREATININE 1.07 1.03   GLUCOSE 100* 121*   AST 16 11   ALT 8 7   BILITOT 0.5 0.6   ALKPHOS 52 49     Troponin T:   Recent Labs     01/16/23  1843 01/17/23  0531   TROPONINI <0.010 <0.010       ABGs: No results found for: PHART, PO2ART, [id number]  INR: No results for input(s): INR in the last 72 hours. URINALYSIS:  Recent Labs     01/16/23 1937   COLORU Yellow   PHUR 6.0   WBCUA 0-2   RBCUA 0-2   BACTERIA Negative   CLARITYU Clear   SPECGRAV 1.015   LEUKOCYTESUR Negative   UROBILINOGEN 2.0*   BILIRUBINUR Small   BLOODU Negative   GLUCOSEU 500*     -----------------------------------------------------------------   XR CHEST (2 VW)    Result Date: 1/16/2023  EXAMINATION: TWO XRAY VIEWS OF THE CHEST 1/16/2023 6:29 pm COMPARISON: None. HISTORY: ORDERING SYSTEM PROVIDED HISTORY: cough , fatigue TECHNOLOGIST PROVIDED HISTORY: Reason for exam:->cough , fatigue What reading provider will be dictating this exam?->CRC FINDINGS: There is focal opacity in the right lower lung. The left lung is clear. The heart is not enlarged. There is no pneumothorax or pleural effusion. Question early focal infiltrates, right lower lung. Follow-up study until resolution recommended. Assessment and Plan     *Acute COPD exacerbation. I started patient on albuterol, Atrovent and Solu-Medrol. *Influenza upper respiratory infection. I started patient on Tamiflu. *Suspect a superimposed bacterial community-acquired pneumonia. I started patient on Rocephin and doxycycline. *Known cardiomyopathy. Patient is stated that his EF is 35%. He receives medical care at the [place]. No clinical evidence of fluid overload or acute systolic or diastolic heart failure. Resume preadmission cardiac vascular medications.   Requested echocardiogram.    *Atrial fibrillation. Patient stated that has had this before. His rate is controlled. He is on beta-blocker. His CHADS2 score is 5. .  I strongly recommended patient to be on anticoagulation. I start the patient on Lovenox 1 mg/kg twice a day. He had refused to allow the nurses to inject. He is declining to allow the use of any blood thinners stating that he has had major GI bleed in the past and I does not want to risk it. At this time I would respect his wishes and to keep him off anticoagulation as long as he is aware of increased risk having embolic stroke. *Diabetes. Continue Jardiance. Continue sliding scale with a sliding scale coverage. *Abnormal chest x-ray, probably secondary to infiltration. Could not exclude underlying malignancy. I would recommend the patient to have a repeated chest x-ray in 4 weeks at the [place] to ensure complete resolution of abnormalities otherwise he may need to have additional work-up and investigation which may include but not limited to CAT scan of the chest and referral to see pulmonology. *Noncompliance. Patient did not allow nurses to inject Lovenox. Patient took off his Holter monitor. This morning The patient is requesting to be discharged home by 9 AM.  He does not want to receive medical care here at [facility]. I spent about 45 minutes discussing his case with him in the room. He is softening his position. He said he will discuss this further with his wife when she comes. I am hoping that the patient will agree to stay for at least another 24 hours. I am hoping that patient will agree to have the Holter monitor back on and allow nurses to deliver the care that have been ordered. I do respect his wishes not to take a blood thinner although I think it is a wrong decision given his [place] score at 5. .            Patient Active Problem List   Diagnosis Code    H/O agent [place] exposure K58.815    H/O thrombocytopenia Z86.2    Renal failure N19 Pericarditis I31.9    Cardiogenic shock (HCC) R57.0    Left bundle branch block I44.7    ASHD (arteriosclerotic heart disease) I25.10    MI (myocardial infarction) (HCC) I21.9    Ischemic cardiomyopathy I25.5    Anginal pain (HCC) I20.9    Carotid artery disease (HCC) I77.9    Dyslipidemia E78.5    Carotid artery stenosis I65.29    Carotid bruit R09.89    Acute transmural MI ([facility]) I21.3    H/O pericarditis Z86.79    Other dyspnea and respiratory abnormality R06.09, R09.89    Hypertension I10    Influenza J11.1       Cheryl Devine MD, MD  Admitting Hospitalist    TTS: 85mins where I focused more than 75% of my attention on rendering care, and planning treatment course for this patient, in addition to talking to RN team, mid levels, consulting with other physicians and following up on labs and imaging. High Risk Readmission Screening Tool Score Noted.      Emergency Contact:

## 2023-01-17 NOTE — ED NOTES
Patient assigned patient to room 246. Waiting on admission order.      [employer]  01/16/23 7375

## 2023-01-17 NOTE — ED NOTES
Called Supervisor, she is on her way down and will assign a bed.      Ivory Echevarria  01/16/23 2035

## 2023-01-17 NOTE — CONSULTS
Chief Complaint   Patient presents with    Other     Pt is sleeping a lot, not eating or drinking. Denies n/v/d. Coughing more. Recently travel out of state.         TELEHEALTH EVALUATION --   Due to COVID 19 outbreak, patient's visit was converted to a virtual visit.  Patient was contacted and agreed to proceed with a virtual visit via  Koko      Services were provided through a video synchronous discussion virtually to substitute for in-person visit.    Total Virtual Visit time spent:25min      Please note this report has been partially produced using speech recognition software and may cause contain errors related to that system including grammar, punctuation and spelling as well as words and phrases that may seem inappropriate. If there are questions or concerns please feel free to contact me to clarify.           Patient is a 78 y.o. male who presents with a chief complaint of shortness of breath. Patient is followed on a regular basis by Dr. Chelsea Patel MD. patient with past medical history of coronary status post PCI, atrial fibrillation, hypertension, hyperlipidemia, cardiomyopathy, GI bleed, carotid artery disease who presents with worsening shortness of breath  [facility]  He was noted to be in A. fib with RVR.  Elevated BNP of 3000 and cardiac enzymes are negative so far.  He is currently being treated for acute exacerbation COPD, influenza as well as possible pneumonia.  He denies any angina type symptoms.  Denies any lower extremity edema.  He  He states he is not on any anticoagulation secondary to history of GI bleed.  He states he underwent cardiac catheterization 1 year ago and was told he has severe CAD not amenable to PCI or CABG.  Medical therapy only.  EKG with normal sinus rhythm, old inferior and anterior lateral infarctions.      Past Medical History:   Diagnosis Date    Acute transmural MI (HCC) 3.1.07    MRI, basal, mid and distal inferior  w/inferoseptal wall involvement extending to the rt ventricular posterior wall.h/o cardiogenic shock. Anginal pain (HCC)     controlled w/beta blockers and Ranexa    ASHD (arteriosclerotic heart disease)     class II    CAD (coronary artery disease)     Cardiogenic shock (HCC)     Carotid artery disease (HCC)     Carotid artery stenosis     Carotid bruit     COPD (chronic obstructive pulmonary disease) (HCC)     Dyslipidemia     H/O agent [place] exposure     as reported by the [place]    H/O pericarditis     H/O thrombocytopenia     Hyperlipidemia     Hypertension     Ischemic cardiomyopathy     Left bundle branch block     chronic,    MI (myocardial infarction) ([address]     remote    Other dyspnea and respiratory abnormality     Pericarditis     Renal failure       Patient Active Problem List   Diagnosis    H/O agent [place] exposure    H/O thrombocytopenia    Renal failure    Pericarditis    Cardiogenic shock (HCC)    Left bundle branch block    ASHD (arteriosclerotic heart disease)    MI (myocardial infarction) ([address]    Ischemic cardiomyopathy    Anginal pain ([address]    Carotid artery disease ([address]    Dyslipidemia    Carotid artery stenosis    Carotid bruit    Acute transmural MI ([address]    H/O pericarditis    Other dyspnea and respiratory abnormality    Hypertension    Influenza       Past Surgical History:   Procedure Laterality Date    ANGIOPLASTY      w/occluded distal vessels and stent placement    CARDIAC CATHETERIZATION      emergent, 100 occluded distal RCA w/ unsuccessful PTCA. Ptca w/BMS x 2 to the MID RCA and PTCA to the PDA w/BMS x 12. 1.07    SKIN BIOPSY Left 04/14/2017       Social History     Socioeconomic History    Marital status:      Spouse name: None    Number of children: None    Years of education: None    Highest education level: None   Tobacco Use    Smoking status: Every Day     Packs/day: 0.75     Years: 55.00     Pack years: 41.25     Types: Cigarettes     Start date: 4/19/1962    Smokeless tobacco: Never   Substance and Sexual Activity    Alcohol use: Not Currently    Drug use: No       Family History   Problem Relation Age of Onset    Heart Attack Mother     Heart Attack Father        Current Facility-Administered Medications   Medication Dose Route Frequency Provider Last Rate Last Admin    [Held by provider] enoxaparin (LOVENOX) injection 80 mg  1 mg/kg SubCUTAneous Daily Sussy Newsome MD        cefTRIAXone (ROCEPHIN) 1,000 mg in dextrose 5 % 50 mL IVPB mini-bag  1,000 mg IntraVENous Q24H Sussy Newsome MD        doxycycline (VIBRAMYCIN) 100 mg in dextrose 5 % 100 mL IVPB  100 mg IntraVENous Q12H Sussy Newsome  mL/hr at 01/17/23 1214 100 mg at 01/17/23 1214    aspirin EC tablet 81 mg  81 mg Oral Daily Sussy Newsome MD   81 mg at 01/17/23 0817    atorvastatin (LIPITOR) tablet 40 mg  40 mg Oral QHS Sussy Newsome MD   40 mg at 01/16/23 2218    carvedilol (COREG) tablet 12.5 mg  12.5 mg Oral BID WC Sussy Newsome MD   12.5 mg at 01/17/23 0817    empagliflozin (JARDIANCE) tablet 25 mg  25 mg Oral Daily Sussy Newsome MD   25 mg at 01/17/23 0818    pantoprazole (PROTONIX) tablet 40 mg  40 mg Oral QAM AC Sussy Newsome MD   40 mg at 01/17/23 0609    sacubitril-valsartan (ENTRESTO) 24-26 MG per tablet 0.5 tablet  0.5 tablet Oral BID Derrick Pedraza MD   0.5 tablet at 01/17/23 0819    spironolactone (ALDACTONE) tablet 25 mg  25 mg Oral Daily Derrick Pedraza MD   25 mg at 01/17/23 0819    glucose chewable tablet 16 g  4 tablet Oral PRN Sussy Newsome MD        dextrose bolus 10% 125 mL  125 mL IntraVENous PRN Derrick Pedraza MD        Or    dextrose bolus 10% 250 mL  250 mL IntraVENous PRN Derrick Pedraza MD        glucagon (rDNA) injection 1 mg  1 mg SubCUTAneous PRN Sussy Newsome MD        dextrose 10 % infusion   IntraVENous Continuous PRN Sussy Newsome MD        sodium chloride flush 0.9 % injection 10 mL  10 mL IntraVENous 2 times per day Derrick Pedraza MD   10 mL at 01/17/23 1201    sodium chloride flush 0.9 % injection 10 mL  10 mL IntraVENous PRN Sussy Newsome MD        0.9 % sodium chloride infusion   IntraVENous PRN Sussy Newsome MD        promethazine (PHENERGAN) tablet 12.5 mg  12.5 mg Oral Q6H PRN Sussy Newsome MD        Or    ondansetron (ZOFRAN) injection 4 mg  4 mg IntraVENous Q6H PRN Alycia Cisse MD        polyethylene glycol (GLYCOLAX) packet 17 g  17 g Oral Daily PRN Alycia Cisse MD        acetaminophen (TYLENOL) tablet 650 mg  650 mg Oral Q6H PRN Sussy Newsome MD        Or    acetaminophen (TYLENOL) suppository 650 mg  650 mg Rectal Q6H PRN Alycia Cisse MD        insulin lispro (HUMALOG) injection vial 0-8 Units  0-8 Units SubCUTAneous TID WC Alycia Csise MD        insulin lispro (HUMALOG) injection vial 0-4 Units  0-4 Units SubCUTAneous Nightly Sussy Newsome MD        oseltamivir (TAMIFLU) capsule 75 mg  75 mg Oral BID Sussy Newsome MD   75 mg at 01/17/23 0817    guaiFENesin (MUCINEX) extended release tablet 600 mg  600 mg Oral BID Alycia Cisse MD   600 mg at 01/17/23 0819    methylPREDNISolone sodium (SOLU-MEDROL) injection 40 mg  40 mg IntraVENous Q12H Sussy Newsome MD   40 mg at 01/17/23 1200    ipratropium-albuterol (DUONEB) nebulizer solution 1 ampule  1 ampule Inhalation TID Alycia Cisse MD           ALLERGIES: Cat hair extract, Heparin, Lovenox [enoxaparin], and Plavix [clopidogrel]    Review of Systems   Constitutional: Negative. Negative for chills and fever. HENT: Negative. Eyes: Negative. Respiratory:  Positive for shortness of breath. Negative for wheezing. Cardiovascular:  Negative for chest pain, palpitations and leg swelling. Gastrointestinal: Negative. Negative for abdominal pain, nausea and vomiting. Endocrine: Negative. Genitourinary: Negative. Musculoskeletal: Negative. Skin: Negative. Negative for rash. Allergic/Immunologic: Negative. Neurological: Negative. Negative for dizziness, weakness and headaches. Hematological: Negative. Psychiatric/Behavioral: Negative. VITALS:  Blood pressure 138/79, pulse 84, temperature 98.1 °F (36.7 °C), temperature source Oral, resp. rate 18, height 5' 11\" (1.803 m), weight 180 lb (81.6 kg), SpO2 91 %. Body mass index is 25.1 kg/m². Physical Exam        PHYSICAL EXAMINATION:  [ INSTRUCTIONS:  \"[x]\" Indicates a positive item  \"[]\" Indicates a negative item  -- DELETE ALL ITEMS NOT EXAMINED]  [x] Alert  [x] Oriented to person/place/time    [x] No apparent distress  [] Toxic appearing    [] Face flushed appearing [x] Sclera clear  [] Lips are cyanotic      [x] Breathing appears normal  [] Appears tachypneic      [] Rash on visible skin    [] Cranial Nerves II-XII grossly intact    [] Motor grossly intact in visible upper extremities    [] Motor grossly intact in visible lower extremities    [x] Normal Mood  [] Anxious appearing    [] Depressed appearing  [] Confused appearing      [] Poor short term memory  [] Poor long term memory    [] OTHER:      Due to this being a TeleHealth encounter, evaluation of the following organ systems is limited: Vitals/Constitutional/EENT/Resp/CV/GI//MS/Neuro/Skin/Heme-Lymph-Imm.       LABS:  Recent Results (from the past 24 hour(s))   EKG 12 Lead    Collection Time: 01/16/23  6:23 PM   Result Value Ref Range    Ventricular Rate 64 BPM    Atrial Rate 94 BPM    QRS Duration 92 ms    Q-T Interval 410 ms    QTc Calculation (Bazett) 422 ms    R Axis 66 degrees    T Axis -61 degrees   COVID-19, Rapid    Collection Time: 01/16/23  6:35 PM    Specimen: Nasopharyngeal Swab   Result Value Ref Range    SARS-CoV-2, NAAT Not Detected Not Detected   Rapid Influenza A/B Antigens    Collection Time: 01/16/23  6:35 PM    Specimen: Nasopharyngeal   Result Value Ref Range    Influenza A by PCR POSITIVE (A)     Influenza B by PCR Negative    CBC with Auto Differential    Collection Time: 01/16/23  6:43 PM   Result Value Ref Range    WBC 5.9 4.2 - 9.0 K/uL RBC 4.45 (L) 4.63 - 6.08 M/uL    Hemoglobin 14.2 13.7 - 17.5 g/dL    Hematocrit 42.1 42.0 - 52.0 %    MCV 94.6 (H) 79.0 - 92.2 fL    MCH 31.9 25.7 - 32.2 pg    MCHC 33.7 32.3 - 36.5 %    RDW 14.6 (H) 11.6 - 14.4 %    Platelets 829 241 - 864 K/uL    Neutrophils % 72.2 (H) 34.0 - 67.9 %    Immature Granulocytes % 0.2 %    Lymphocytes % 20.8 %    Monocytes % 5.6 5.3 - 12.2 %    Eosinophils % 0.7 (L) 0.8 - 7.0 %    Basophils % 0.5 0.2 - 1.2 %    Neutrophils Absolute 4.2 1.8 - 5.4 K/uL    Immature Granulocytes # 0.0 K/uL    Lymphocytes Absolute 1.2 (L) 1.3 - 3.6 K/uL    Monocytes Absolute 0.3 0.3 - 0.8 K/uL    Eosinophils Absolute 0.0 0.0 - 0.5 K/uL    Basophils Absolute 0.0 0.0 - 0.1 K/uL   Comprehensive Metabolic Panel w/ Reflex to MG    Collection Time: 01/16/23  6:43 PM   Result Value Ref Range    Sodium 139 135 - 144 mEq/L    Potassium reflex Magnesium 3.5 3.4 - 4.9 mEq/L    Chloride 103 95 - 107 mEq/L    CO2 29 20 - 31 mEq/L    Anion Gap 7 (L) 9 - 15 mEq/L    Glucose 100 (H) 70 - 99 mg/dL    BUN 12 8 - 23 mg/dL    Creatinine 1.07 0.70 - 1.20 mg/dL    Est, Glom Filt Rate >60.0 >60    Calcium 9.9 8.5 - 9.9 mg/dL    Total Protein 6.2 (L) 6.3 - 8.0 g/dL    Albumin 3.4 (L) 3.5 - 4.6 g/dL    Total Bilirubin 0.5 0.2 - 0.7 mg/dL    Alkaline Phosphatase 52 35 - 104 U/L    ALT 8 0 - 41 U/L    AST 16 0 - 40 U/L    Globulin 2.8 2.3 - 3.5 g/dL   Troponin    Collection Time: 01/16/23  6:43 PM   Result Value Ref Range    Troponin <0.010 0.000 - 0.010 ng/mL   Brain Natriuretic Peptide    Collection Time: 01/16/23  6:43 PM   Result Value Ref Range    Pro-BNP 3,190 pg/mL   Procalcitonin    Collection Time: 01/16/23  6:43 PM   Result Value Ref Range    Procalcitonin 0.06 0.00 - 0.15 ng/mL   Magnesium    Collection Time: 01/16/23  6:43 PM   Result Value Ref Range    Magnesium 2.0 1.7 - 2.4 mg/dL   Urinalysis with Reflex to Culture    Collection Time: 01/16/23  7:37 PM    Specimen: Urine   Result Value Ref Range    Color, UA Yellow Straw/Yellow    Clarity, UA Clear Clear    Glucose, Ur 500 (A) Negative mg/dL    Bilirubin Urine Small Negative    Ketones, Urine Trace Negative mg/dL    Specific Gravity, UA 1.015 1.005 - 1.030    Blood, Urine Negative Negative    pH, UA 6.0 5.0 - 9.0    Protein, UA 30 (A) Negative mg/dL    Urobilinogen, Urine 2.0 (A) <2.0 E.U./dL    Nitrite, Urine Negative Negative    Leukocyte Esterase, Urine Negative Negative    Urine Reflex to Culture Not Indicated    Microscopic Urinalysis    Collection Time: 01/16/23  7:37 PM   Result Value Ref Range    WBC, UA 0-2 0 - 5 /HPF    RBC, UA 0-2 0 - 2 /HPF    Epithelial Cells, UA 0-2 /HPF    Bacteria, UA Negative Negative /HPF   EKG 12 Lead    Collection Time: 01/17/23 12:38 AM   Result Value Ref Range    Ventricular Rate 95 BPM    Atrial Rate 76 BPM    QRS Duration 94 ms    Q-T Interval 344 ms    QTc Calculation (Bazett) 432 ms    R Axis 92 degrees    T Axis -71 degrees   Comprehensive Metabolic Panel w/ Reflex to MG    Collection Time: 01/17/23  5:31 AM   Result Value Ref Range    Sodium 141 135 - 144 mEq/L    Potassium reflex Magnesium 3.7 3.4 - 4.9 mEq/L    Chloride 103 95 - 107 mEq/L    CO2 28 20 - 31 mEq/L    Anion Gap 10 9 - 15 mEq/L    Glucose 121 (H) 70 - 99 mg/dL    BUN 11 8 - 23 mg/dL    Creatinine 1.03 0.70 - 1.20 mg/dL    Est, Glom Filt Rate >60.0 >60    Calcium 10.1 (H) 8.5 - 9.9 mg/dL    Total Protein 6.5 6.3 - 8.0 g/dL    Albumin 3.6 3.5 - 4.6 g/dL    Total Bilirubin 0.6 0.2 - 0.7 mg/dL    Alkaline Phosphatase 49 35 - 104 U/L    ALT 7 0 - 41 U/L    AST 11 0 - 40 U/L    Globulin 2.9 2.3 - 3.5 g/dL   CBC with Auto Differential    Collection Time: 01/17/23  5:31 AM   Result Value Ref Range    WBC 5.5 4.2 - 9.0 K/uL    RBC 4.77 4.63 - 6.08 M/uL    Hemoglobin 14.8 13.7 - 17.5 g/dL    Hematocrit 44.5 42.0 - 52.0 %    MCV 93.3 (H) 79.0 - 92.2 fL    MCH 31.0 25.7 - 32.2 pg    MCHC 33.3 32.3 - 36.5 %    RDW 14.5 (H) 11.6 - 14.4 %    Platelets 479 938 - 657 K/uL Neutrophils % 87.1 (H) 34.0 - 67.9 %    Immature Granulocytes % 0.4 %    Lymphocytes % 10.5 %    Monocytes % 1.6 (L) 5.3 - 12.2 %    Eosinophils % 0.2 (L) 0.8 - 7.0 %    Basophils % 0.2 0.2 - 1.2 %    Neutrophils Absolute 4.8 1.8 - 5.4 K/uL    Immature Granulocytes # 0.0 K/uL    Lymphocytes Absolute 0.6 (L) 1.3 - 3.6 K/uL    Monocytes Absolute 0.1 (L) 0.3 - 0.8 K/uL    Eosinophils Absolute 0.0 0.0 - 0.5 K/uL    Basophils Absolute 0.0 0.0 - 0.1 K/uL   Troponin    Collection Time: 01/17/23  5:31 AM   Result Value Ref Range    Troponin <0.010 0.000 - 0.010 ng/mL   POCT Glucose    Collection Time: 01/17/23  6:43 AM   Result Value Ref Range    POC Glucose 123 (H) 70 - 99 mg/dl    Performed on ACCU-CHEK    POCT Glucose    Collection Time: 01/17/23 11:32 AM   Result Value Ref Range    POC Glucose 109 (H) 70 - 99 mg/dl    Performed on ACCU-CHEK    EKG 12 Lead    Collection Time: 01/17/23 12:10 PM   Result Value Ref Range    Ventricular Rate 78 BPM    Atrial Rate 78 BPM    P-R Interval 194 ms    QRS Duration 96 ms    Q-T Interval 396 ms    QTc Calculation (Bazett) 451 ms    P Axis 35 degrees    R Axis 86 degrees    T Axis -56 degrees     Troponin:   Lab Results   Component Value Date/Time    TROPONINI <0.010 01/17/2023 05:31 AM             ASSESSMENT:    Shortness of breath likely multifactorial secondary to influenza, pneumonia/acute exacerbation COPD. Does not appear to be in acute decompensated heart failure    History of severe CAD status post PCI. Apparently patient has advanced CAD not amenable to PCI or CABG per cardiac catheterization 1 year ago at the [address] [place] [address] per patient    History of myocardial infarction    History of ischemic cardiomyopathy    History of paroxysmal atrial fibrillation. Currently normal sinus rhythm    Hypertension    Hyperlipidemia    History of carotid artery disease    History of GI bleed    PLAN:   As always, aggressive risk factor modification is strongly recommended.  We should adhere to the JNC VIII guidelines for HTN management and the NCEPATP III guidelines for LDL-C management. Check 2D echocardiogram  Maximize cardiac medications-aspirin, Lipitor, Coreg, Entresto, spironolactone  Monitor on telemetry  IV Lasix given. Monitor I's and O's and renal function. Maintain test between 4-5 and magnesium greater than 2. Monitor for fluid overload. Poor long-term oral anticoagulation candidate secondary to history of GI bleed per patient  No plans for any invasive work-up at this time. Patient follows with cardiology at the [place]. No anginal symptoms  Further recommendations to follow    Thank you for allowing me to participate in the care of your patient, please don't hesitate to contact me if you have any further questions.     Electronically signed by Reba Butler DO on 1/17/2023 at 2:39 PM

## 2023-01-17 NOTE — PLAN OF CARE
Problem: Discharge Planning  Goal: Discharge to home or other facility with appropriate resources  Outcome: Progressing     Problem: Safety - Adult  Goal: Free from fall injury  Outcome: Progressing     Problem: Risk for Elopement  Goal: Patient will not exit the unit/facility without proper excort  Outcome: Progressing  Flowsheets  Taken 1/16/2023 2154  Nursing Interventions for Elopement Risk:   Assist with personal care needs such as toileting, eating, dressing, as needed to reduce the risk of wandering   Collaborate with family members/caregivers to mitigate the elopement risk  Taken 1/16/2023 2100  Nursing Interventions for Elopement Risk:   Assist with personal care needs such as toileting, eating, dressing, as needed to reduce the risk of wandering   Collaborate with family members/caregivers to mitigate the elopement risk

## 2023-01-17 NOTE — ED NOTES
Called Supervisor to let her know patient is ready to be moved to the floor.      Hima Boone  01/16/23 4226

## 2023-01-18 VITALS
HEIGHT: 71 IN | HEART RATE: 67 BPM | RESPIRATION RATE: 18 BRPM | OXYGEN SATURATION: 94 % | TEMPERATURE: 97.6 F | WEIGHT: 180 LBS | SYSTOLIC BLOOD PRESSURE: 145 MMHG | BODY MASS INDEX: 25.2 KG/M2 | DIASTOLIC BLOOD PRESSURE: 87 MMHG

## 2023-01-18 LAB
EKG ATRIAL RATE: 78 BPM
EKG P AXIS: 35 DEGREES
EKG P-R INTERVAL: 194 MS
EKG Q-T INTERVAL: 396 MS
EKG QRS DURATION: 96 MS
EKG QTC CALCULATION (BAZETT): 451 MS
EKG R AXIS: 86 DEGREES
EKG T AXIS: -56 DEGREES
EKG VENTRICULAR RATE: 78 BPM
GLUCOSE BLD-MCNC: 117 MG/DL (ref 70–99)
PERFORMED ON: ABNORMAL

## 2023-01-18 PROCEDURE — 96366 THER/PROPH/DIAG IV INF ADDON: CPT

## 2023-01-18 PROCEDURE — G0378 HOSPITAL OBSERVATION PER HR: HCPCS

## 2023-01-18 PROCEDURE — 2500000003 HC RX 250 WO HCPCS: Performed by: INTERNAL MEDICINE

## 2023-01-18 PROCEDURE — 6370000000 HC RX 637 (ALT 250 FOR IP): Performed by: INTERNAL MEDICINE

## 2023-01-18 PROCEDURE — 2580000003 HC RX 258: Performed by: INTERNAL MEDICINE

## 2023-01-18 RX ORDER — PREDNISONE 10 MG/1
10 TABLET ORAL SEE ADMIN INSTRUCTIONS
Qty: 15 TABLET | Refills: 0 | Status: SHIPPED | OUTPATIENT
Start: 2023-01-18

## 2023-01-18 RX ORDER — CARVEDILOL 25 MG/1
25 TABLET ORAL 2 TIMES DAILY WITH MEALS
Qty: 60 TABLET | Refills: 0 | Status: SHIPPED | OUTPATIENT
Start: 2023-01-18

## 2023-01-18 RX ORDER — GUAIFENESIN 600 MG/1
600 TABLET, EXTENDED RELEASE ORAL 2 TIMES DAILY PRN
Qty: 20 TABLET | Refills: 1 | Status: SHIPPED | OUTPATIENT
Start: 2023-01-18

## 2023-01-18 RX ORDER — OSELTAMIVIR PHOSPHATE 75 MG/1
75 CAPSULE ORAL 2 TIMES DAILY
Qty: 7 CAPSULE | Refills: 0 | Status: SHIPPED | OUTPATIENT
Start: 2023-01-18 | End: 2023-01-22

## 2023-01-18 RX ORDER — CARVEDILOL 25 MG/1
12.5 TABLET ORAL 2 TIMES DAILY WITH MEALS
Refills: 0 | COMMUNITY
Start: 2023-01-18 | End: 2023-01-18 | Stop reason: SDUPTHER

## 2023-01-18 RX ORDER — DOXYCYCLINE HYCLATE 100 MG
100 TABLET ORAL 2 TIMES DAILY
Qty: 10 TABLET | Refills: 0 | Status: SHIPPED | OUTPATIENT
Start: 2023-01-18 | End: 2023-01-23

## 2023-01-18 RX ADMIN — PANTOPRAZOLE SODIUM 40 MG: 40 TABLET, DELAYED RELEASE ORAL at 06:04

## 2023-01-18 RX ADMIN — DOXYCYCLINE 100 MG: 100 INJECTION, POWDER, LYOPHILIZED, FOR SOLUTION INTRAVENOUS at 00:44

## 2023-01-18 NOTE — PROGRESS NOTES
Pt. Alexus Foy about leaving today. Pt. Stated, \" Im leaving at 9 today. \" Upon entering his room for his a.m. med pass, the Pt. Took off his TELE monitor, and his IV. Informed incoming RN. Pt. Call light is w/in reach. Will continue to monitor.

## 2023-01-18 NOTE — PLAN OF CARE
Pt. is in bed, fully clothed w/ shoes on, stated, \"Im leaving at 9. \" I informed him that it was 9 pm, and not a.m. He is A/O/ x 4, Indep, supervision. He takes his po medications whole. No c/o pain at this time. Pt. Has O2 via NC at 2L, POX at 94%, no c/o SOB, or difficulty breathing. TELE running nrml SR, Bundle Branch block, and PVC multifocal. HS BS at 216, no SSIC needed, taking Solumedrol. Pt. Has a 20 ga in his left AC, that is clean, dry, and intact. Last BM on 01/16/2023, no c/o constipation, abd pain, or diarrhea. He has ATB, Rocephin, and Doxycycline therapy. Call light is w/in reach. He is in bed resting comfortably. Will continue to monitor.

## 2023-01-18 NOTE — DISCHARGE INSTRUCTIONS
Follow up with Dr. Jose Ramon Najera in the next 7 days or sooner if needed. Please return to ER or call 911 if you develop any significant signs or symptoms. I may or may not have addressed all of your symptoms, medical issues,  Illnesses, or all of the abnormal blood work or imaging therefore please ask your PCP and other specialists to obtain [facility] record entirely to follow up on all of your symptoms, illnesses, abnormal labs, imaging and findings that I have and have not addressed during your hospitalization. I would recommend that you get repeat chest x-ray in 4 to 6 weeks to ensure that the abnormalities seen are resolved otherwise you may need to have additional work-up and/or investigation. These are to be handled and arranged by your primary care doctor or lung doctor at the [place]. Discharging you from the hospital does not mean that your medical care ends here and now. You may still need to have additional work up, investigation, monitoring, surveillance, and treatment to be handled from this point on by in the out patient setting by  out patient providers including your PCP, Specialists and other healthcare providers. For medication questions, contact your retail pharmacy and your PCP. Your medical team at [facility] appreciates the opportunity to work with you to get well!     Jany Bonilla MD

## 2023-01-18 NOTE — DISCHARGE SUMMARY
Hospital Medicine Discharge Summary    Roz Gann  :  1945  MRN:  [id number]    Admit date:  2023  Discharge date:  2023    Admitting Physician:  Hollie Morgan MD  Primary Care Physician:  Glenn Karimi MD      Discharge Diagnoses:      *Acute COPD exacerbation. Resolved. *Influenza upper respiratory infection. Continue Tamiflu for a total of 10 doses. *Suspect a superimposed bacterial community-acquired pneumonia. I started patient on Rocephin and doxycycline. Discharge patient on doxycycline. I recommended the patient to have a repeat chest x-ray in 4 to 6 weeks to ensure complete resolution of the infiltrate otherwise he will need to have additional diagnostic and therapeutic intervention which may include but not limited to CAT scan of the chest and a pulmonary evaluation. Patient has pulmonologist already at [place]. *Known cardiomyopathy. Patient is stated that his EF is 35%. He receives medical care at the [place]. No clinical evidence of fluid overload or acute systolic or diastolic heart failure. Resume preadmission cardiac vascular medications. Requested echocardiogram.  Repeat echocardiogram here showed ejection fraction is 50%. Patient may not to be on cardiomyopathy medications such as Entresto and Aldactone. I would leave it up to his cardiologist at the [place] to make that decision. *Atrial fibrillation. Patient stated that has had this before. His rate is controlled. He is on beta-blocker. His CHADS2 score is 5. .  I strongly recommended patient to be on anticoagulation. I start the patient on Lovenox 1 mg/kg twice a day. He had refused to allow the nurses to inject. He is declining to allow the use of any blood thinners stating that he has had major GI bleed in the past and I does not want to risk it. At this time I would respect his wishes and to keep him off anticoagulation as long as he is aware of increased risk having embolic stroke.      *Abnormal chest x-ray, probably secondary to infiltration. Could not exclude underlying malignancy. I would recommend the patient to have a repeated chest x-ray in 4 weeks at the [place] to ensure complete resolution of abnormalities otherwise he may need to have additional work-up and investigation which may include but not limited to CAT scan of the chest and referral to see pulmonology. Patient has multiple medical issues. Patient is feeling great. Patient wants to be discharged home by 9:00 AM.  I advised him to stay for at least another day to monitor his condition and hemodynamics closely. He understood my recommendation very well but insisted on going home as soon as possible this morning. At this time, I do not have clear or strong clinical justification to extend inpatient hospitalization against his will and desire to go home. .  Patient however would definitely need and require close and frequent monitoring as well as additional work-up, investigation and therapeutic intervention that potentially could take place in the outpatient setting by primary care doctor in collaboration with other specialists. That is to prevent relapse, decompensation, rehospitalization and other medical implications. Hospital Course:   Darryl Stephens is a 70 y.o. male that was admitted and treated at [facility] for the aforementioned medical issues. Patient was treated effectively as above. Patient wants to go home this morning without further negotiation. Patient will be discharged to home to respect his wishes. Patient would need to follow-up with his [place] PCP, pulmonology and cardiology. General appearance: alert, appears stated age and cooperative, no apparent distress. Skin: Skin color, texture, turgor normal. No rashes or lesions  HEENT: Head: Normocephalic, no lesions, without obvious abnormality.   Neck: no adenopathy, no carotid bruit, no JVD, supple, symmetrical, trachea midline, and thyroid not enlarged, symmetric, no tenderness/mass/nodules  Lungs:    Complete resolution of bilateral wheezing and rhonchi compared to yesterday. Heart: irregularly irregular rhythm  Abdomen: soft, non-tender; bowel sounds normal; no masses,  no organomegaly  Extremities: extremities normal, atraumatic, no cyanosis or edema  Neurologic: Mental status: Alert, oriented, thought content appropriate   Patient was seen by the following consultants while admitted to [facility]:   Consults:  [address]    Significant Diagnostic Studies:    XR CHEST (2 VW)    Result Date: 1/16/2023  EXAMINATION: TWO XRAY VIEWS OF THE CHEST 1/16/2023 6:29 pm COMPARISON: None. HISTORY: ORDERING SYSTEM PROVIDED HISTORY: cough , fatigue TECHNOLOGIST PROVIDED HISTORY: Reason for exam:->cough , fatigue What reading provider will be dictating this exam?->CRC FINDINGS: There is focal opacity in the right lower lung. The left lung is clear. The heart is not enlarged. There is no pneumothorax or pleural effusion. Question early focal infiltrates, right lower lung. Follow-up study until resolution recommended. Discharge Medications:         Medication List        START taking these medications      doxycycline hyclate 100 MG tablet  Commonly known as: VIBRA-TABS  Take 1 tablet by mouth 2 times daily for 5 days     guaiFENesin 600 MG extended release tablet  Commonly known as: MUCINEX  Take 1 tablet by mouth 2 times daily as needed for Congestion (Cough)     oseltamivir 75 MG capsule  Commonly known as: TAMIFLU  Take 1 capsule by mouth 2 times daily for 7 doses     predniSONE 10 MG tablet  Commonly known as: DELTASONE  Take 1 tablet by mouth See Admin Instructions Take 1 tablet twice a day for 5 days then 1 tablet daily.             CHANGE how you take these medications      carvedilol 25 MG tablet  Commonly known as: COREG  What changed: how much to take            CONTINUE taking these medications      albuterol sulfate  (90 Base) MCG/ACT inhaler  Commonly known as: PROVENTIL;VENTOLIN;PROAIR     alendronate 70 MG tablet  Commonly known as: FOSAMAX     aspirin 81 MG EC tablet     atorvastatin 40 MG tablet  Commonly known as: LIPITOR     cyanocobalamin 1000 MCG tablet     empagliflozin 25 MG tablet  Commonly known as: JARDIANCE     furosemide 20 MG tablet  Commonly known as: LASIX     lisinopril 5 MG tablet  Commonly known as: PRINIVIL;ZESTRIL     nitroGLYCERIN 0.4 MG SL tablet  Commonly known as: NITROSTAT     omeprazole 20 MG delayed release capsule  Commonly known as: PRILOSEC     ProAir Digihaler 108 (90 Base) MCG/ACT Aepb  Generic drug: Albuterol Sulfate (sensor)     sacubitril-valsartan 24-26 MG per tablet  Commonly known as: ENTRESTO     sildenafil 100 MG tablet  Commonly known as: VIAGRA     spironolactone 25 MG tablet  Commonly known as: ALDACTONE     tiotropium-olodaterol 2.5-2.5 MCG/ACT Aers  Commonly known as: STIOLTO     Vitamin D3 50 MCG (2000 UT) Caps               Where to Get Your Medications        These medications were sent to [address] [place] [id number]      Phone: [phone]   doxycycline hyclate 100 MG tablet  guaiFENesin 600 MG extended release tablet  oseltamivir 75 MG capsule  predniSONE 10 MG tablet         Disposition:   Discharged to Home. Any [facility] needs that were indicated and/or required as been addressed and set up by Social Work. Condition at discharge: Pt was medically stable at the time of discharge. Significant improvement in clinical condition compared to initial condition at presentation to hospital    Activity: activity as tolerated, fall precautions. Total time taken for discharging this patient: 40 minutes. Greater than 70% of time was spent focused exclusively on this patient.  Time was taken to review chart, discuss plans with consultants, reconciling medications, discussing plan answering questions with patient. Annika Prado MD  1/18/2023, 9:42 AM  ----------------------------------------------------------------------------------------------------------------------    Raiza Couch,     Please return to ER or call 911 if you develop any significant signs or symptoms. I may not have addressed all of your medical illnesses or the abnormal blood work or imaging therefore please ask your PCP Seven Edwards MD and other out patient specialists and providers  to obtain [facility] record entirely to follow up on all of the abnormal labs, imaging and findings that I have and have not addressed during your hospitalization. Discharging you from the hospital does not mean that your medical care ends here and now. You may still need additional work up, investigation, monitoring, and treatment to be handled from this point on by outside providers including your PCP, Seven Edwards MD , Specialists and other healthcare providers. Please review your list of discharge medications prior to resuming medications you might still have at home, as the medications you need to be taking, dosages or how often you must take them may have changed. For medication questions, contact your retail pharmacy and your PCP, Seven Edwards MD .     ** I STRONGLY RECOMMEND that you follow up with Seven Edwards MD within 3 to 5 days for a post hospitalization evaluation. This specific office visit is covered by your insurance, and is not the same as your annual doctor visit/ check up. This office visit is important, as it may prevent need for repeat and/or future hospitalizations. **    Your medical team at [facility]) appreciates the opportunity to work with you to get well! Sincerely,  Walt Garduno MD Follow up with Dr. Seven Edwards MD in the next 7 days or sooner if needed. Please return to ER or call 911 if you develop any significant signs or symptoms.     I may or may not have addressed all of your symptoms, medical issues,  Illnesses, or all of the abnormal blood work or imaging therefore please ask your PCP and other specialists to obtain [facility] record entirely to follow up on all of your symptoms, illnesses, abnormal labs, imaging and findings that I have and have not addressed during your hospitalization. Discharging you from the hospital does not mean that your medical care ends here and now. You may still need to have additional work up, investigation, monitoring, surveillance, and treatment to be handled from this point on by in the out patient setting by  out patient providers including your PCP, Specialists and other healthcare providers. For medication questions, contact your retail pharmacy and your PCP. Your medical team at [facility]) appreciates the opportunity to work with you to get well!     Sarita Villalta MD

## 2023-01-22 LAB
BLOOD CULTURE, ROUTINE: NORMAL
BLOOD CULTURE, ROUTINE: NORMAL

## 2025-06-19 NOTE — PROGRESS NOTES
History of Present Illness    Shola Carbajal is a 80 y.o. male who is seen at the request of Dr. Madrid for the management of an oral cavity cancer.  He has had mild discomfort around the lower jaw for quite some time.  This was biopsied and consistent with squamous cell carcinoma.  He has some scanning done.  Unfortunately we were not able to get the films yet.      Past Medical History    The past medical history shows some cardiovascular issues, high blood pressure, elevated cholesterol, COPD.  His medications are documented in the chart.  He does have a few allergies to things that are listed in his chart.  There is no allergies to antibiotics.  He has been smoking a pack of cigarettes a day for 62 years.  He is now retired.  He is here today with his wife.    Physical Exam    The patient is alert and oriented. Examination of the external ears, ear canals, and eardrums, is within normal limits. Examination of the anterior and external nose is negative. Examination of the oral cavity and oropharynx shows a small area of ulceration around the posterior aspect of the left inferior gum.  He also has some buccal mucosal changes right next to a gold implant.  There is good mobility of the tongue and palate. There is good mandibular excursion.  He does have some asymmetry of the lower lip because of a prior surgery for lip cancer.  He also has numbness of his lower lip.  Palpation of the parotid, neck, and thyroid field fails to show any worrisome masses or adenopathies.    A flexible laryngoscopy was carried out. Under topical Xylocaine and Andrea-Synephrine the scope was introduced through the nostril. The nasopharynx, base of tongue, hypopharynx, and larynx are visualized.  The vocal cords are normally mobile. There is no pooling of secretions in the piriform sinuses. There is no evidence of any mucosal lesions.    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  The patient will be set up for a  direct laryngoscopy, flexible bronchoscopy, gastro esophagoscopy and insertion of a feeding tube (PEG), possible tracheotomy, left composite resection, mandibular reconstruction with a locking reconstruction plate, and free tissue reconstruction with tissue from the leg, or back, and possible skin graft.  We are still trying to retrieve the scans.  I answered all their questions.    I will see him at the time of surgery.

## 2025-06-20 ENCOUNTER — OFFICE VISIT (OUTPATIENT)
Dept: OTOLARYNGOLOGY | Facility: HOSPITAL | Age: 80
End: 2025-06-20
Payer: COMMERCIAL

## 2025-06-20 VITALS — HEIGHT: 71 IN | WEIGHT: 167.3 LBS | BODY MASS INDEX: 23.42 KG/M2

## 2025-06-20 DIAGNOSIS — C06.9 CANCER OF ORAL CAVITY (MULTI): Primary | ICD-10-CM

## 2025-06-20 PROCEDURE — 1159F MED LIST DOCD IN RCRD: CPT | Performed by: OTOLARYNGOLOGY

## 2025-06-20 PROCEDURE — 99214 OFFICE O/P EST MOD 30 MIN: CPT | Mod: 25 | Performed by: OTOLARYNGOLOGY

## 2025-06-20 PROCEDURE — 31575 DIAGNOSTIC LARYNGOSCOPY: CPT | Performed by: OTOLARYNGOLOGY

## 2025-06-20 PROCEDURE — 99204 OFFICE O/P NEW MOD 45 MIN: CPT | Performed by: OTOLARYNGOLOGY

## 2025-06-20 PROCEDURE — 1160F RVW MEDS BY RX/DR IN RCRD: CPT | Performed by: OTOLARYNGOLOGY

## 2025-06-20 RX ORDER — FUROSEMIDE 20 MG/1
60 TABLET ORAL
COMMUNITY
Start: 2024-04-23

## 2025-06-20 RX ORDER — METOPROLOL SUCCINATE 25 MG/1
12.5 TABLET, EXTENDED RELEASE ORAL
COMMUNITY
Start: 2024-04-23

## 2025-06-20 RX ORDER — SPIRONOLACTONE 25 MG/1
12.5 TABLET ORAL
COMMUNITY
Start: 2024-04-23

## 2025-06-20 RX ORDER — HYDROCHLOROTHIAZIDE 12.5 MG/1
CAPSULE ORAL
COMMUNITY

## 2025-06-20 RX ORDER — ALBUTEROL SULFATE 90 UG/1
INHALANT RESPIRATORY (INHALATION)
COMMUNITY
Start: 2025-05-14

## 2025-06-20 RX ORDER — ATORVASTATIN CALCIUM 40 MG/1
40 TABLET, FILM COATED ORAL NIGHTLY
COMMUNITY
Start: 2024-04-23

## 2025-06-20 RX ORDER — IMIQUIMOD 12.5 MG/.25G
CREAM TOPICAL
COMMUNITY
Start: 2025-05-15

## 2025-06-20 RX ORDER — KETOCONAZOLE 20 MG/G
CREAM TOPICAL
COMMUNITY
Start: 2025-06-16

## 2025-06-20 RX ORDER — RISEDRONATE SODIUM 150 MG/1
150 TABLET, FILM COATED ORAL
COMMUNITY
Start: 2024-07-24

## 2025-06-20 RX ORDER — ALENDRONATE SODIUM 70 MG/1
70 TABLET ORAL
COMMUNITY

## 2025-06-20 RX ORDER — CHOLECALCIFEROL (VITAMIN D3) 50 MCG
TABLET ORAL
COMMUNITY
Start: 2024-11-12

## 2025-06-20 RX ORDER — CARVEDILOL 6.25 MG/1
TABLET ORAL
COMMUNITY

## 2025-06-20 RX ORDER — ASPIRIN 81 MG/1
TABLET ORAL
COMMUNITY
Start: 2024-04-23

## 2025-06-20 RX ORDER — SILDENAFIL 100 MG/1
50 TABLET, FILM COATED ORAL
COMMUNITY
Start: 2023-10-17

## 2025-06-20 RX ORDER — NITROGLYCERIN 0.4 MG/1
0.4 TABLET SUBLINGUAL
COMMUNITY

## 2025-06-20 ASSESSMENT — PATIENT HEALTH QUESTIONNAIRE - PHQ9
2. FEELING DOWN, DEPRESSED OR HOPELESS: NOT AT ALL
SUM OF ALL RESPONSES TO PHQ9 QUESTIONS 1 AND 2: 0
1. LITTLE INTEREST OR PLEASURE IN DOING THINGS: NOT AT ALL

## 2025-06-23 ENCOUNTER — TELEPHONE (OUTPATIENT)
Dept: OTOLARYNGOLOGY | Facility: HOSPITAL | Age: 80
End: 2025-06-23
Payer: MEDICARE

## 2025-06-23 NOTE — TELEPHONE ENCOUNTER
I received a message at 12:37 p.m. asking for a call back.  I called and spoke with Kristin who left the message and Shola who was also with her.    They stated they were told that we got his images today.  I confirmed that is true.  Dr. Chacon hasn't seen them yet as he's in surgery.  I did add that he only had a CT neck done.  So, we will be getting a CT chest as well as the CTA.  I'm still working on those apts.    Their big question centered around his heart issues.  [facility] Erika Avalos, in May.  I asked if they could have the office note sent to us.  I also message Vivi Veragra, RN in ENT to see if she could forward it to me.  I stated that information will be helpful to anesthesia.  I also suggested that they make his cardiologist aware of the upcoming surgery to see if there is testing he/she wants Mr. Carbajal to have prior to surgery.    They stated they would try to reach the office and appreciated the information.  They just wanted to be sure someone would be looking at his cardiac situation prior to surgery.

## 2025-06-24 ENCOUNTER — TELEPHONE (OUTPATIENT)
Facility: CLINIC | Age: 80
End: 2025-06-24
Payer: MEDICARE

## 2025-06-24 DIAGNOSIS — C06.9 CANCER OF ORAL CAVITY (MULTI): ICD-10-CM

## 2025-06-24 DIAGNOSIS — F17.200 SMOKER: ICD-10-CM

## 2025-06-24 NOTE — TELEPHONE ENCOUNTER
Mr. Carbajal was seen on Friday and set up for surgery on July 17th with Nan Chacon and William.  He needed several apts arranged prior to the surgery and was hoping to have as much coordinated for the same day as possible.    I was finally able to get most of the apts arranged.  I was able to coordinate the needed radiology imaging and apt with Dr. Thomas for 7/1/25.  The needed preadmission testing apt is pending and he will get a call from [facility] to arrange that.    I called and left Mr. Carbajal a message this afternoon that I got most of his needed apts arranged for July 1st.  I asked that he please call me to discuss.  I will await his call back.    Addendum at 3:45 p.m.:  I connected with  And Mrs. Carbajal.  They were agreeable with the following arrangement.    The following apts will take place at [address] on Tuesday, July 1st:     Arrive at 1:45 p.m. for 2 p.m. CT chest and CTA, 2nd floor [facility] radiology and clear liquids only 3 hours before the test.  See Dr. Thomas at 3:30 p.m. after the scans on the first floor [facility], Desk A    They know they will get a call from [facility] to arrange the preadmission testing.    Surgery packet mailed out and I encouraged them to call me with any questions/concerns in the meantime.

## 2025-07-01 ENCOUNTER — HOSPITAL ENCOUNTER (OUTPATIENT)
Dept: RADIOLOGY | Facility: HOSPITAL | Age: 80
Discharge: HOME | End: 2025-07-01
Payer: MEDICARE

## 2025-07-01 ENCOUNTER — OFFICE VISIT (OUTPATIENT)
Dept: OTOLARYNGOLOGY | Facility: HOSPITAL | Age: 80
End: 2025-07-01
Payer: MEDICARE

## 2025-07-01 VITALS — TEMPERATURE: 97.2 F | WEIGHT: 169 LBS | BODY MASS INDEX: 23.91 KG/M2

## 2025-07-01 DIAGNOSIS — C06.9 CANCER OF ORAL CAVITY (MULTI): ICD-10-CM

## 2025-07-01 DIAGNOSIS — F17.200 SMOKER: ICD-10-CM

## 2025-07-01 DIAGNOSIS — C06.9 CANCER OF ORAL CAVITY (MULTI): Primary | ICD-10-CM

## 2025-07-01 PROCEDURE — 2550000001 HC RX 255 CONTRASTS: Performed by: OTOLARYNGOLOGY

## 2025-07-01 PROCEDURE — 71260 CT THORAX DX C+: CPT | Performed by: RADIOLOGY

## 2025-07-01 PROCEDURE — 75635 CT ANGIO ABDOMINAL ARTERIES: CPT

## 2025-07-01 PROCEDURE — 75635 CT ANGIO ABDOMINAL ARTERIES: CPT | Performed by: RADIOLOGY

## 2025-07-01 PROCEDURE — 99215 OFFICE O/P EST HI 40 MIN: CPT | Performed by: STUDENT IN AN ORGANIZED HEALTH CARE EDUCATION/TRAINING PROGRAM

## 2025-07-01 PROCEDURE — 99215 OFFICE O/P EST HI 40 MIN: CPT | Mod: 25 | Performed by: STUDENT IN AN ORGANIZED HEALTH CARE EDUCATION/TRAINING PROGRAM

## 2025-07-01 PROCEDURE — 71260 CT THORAX DX C+: CPT

## 2025-07-01 RX ADMIN — IOHEXOL 150 ML: 350 INJECTION, SOLUTION INTRAVENOUS at 12:31

## 2025-07-01 NOTE — H&P (VIEW-ONLY)
HEAD AND NECK SURGERY CONSULT  [facility]    Referring Provider: Fili Chacon    HPI  I had the pleasure of seeing Shola Carbajal as a consult today for reconstruction of Left oral cavity SCC. Patient presents alone.    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    Patient was originally evaluated for new cancer by his dentist after breaking a tooth while eating. He was then biopsied by an outside ENT and referred here for management. He recently saw my partner Dr Chacon with plans for resection and reconstruction 7/17. Had scans done today    Patient has a history of COPD, heart attack in 2007, and defibrillator placement. Right handed. No prior injury or surgery to shoulder, arms or legs other than knee replacement    Tobacco use: The patient  reports that he has been smoking cigarettes. He started smoking about 62 years ago. He has a 62.5 pack-year smoking history. He has been exposed to tobacco smoke. He has never used smokeless tobacco.   Alcohol Use: The patient  has no history on file for alcohol use.     Physical Examination  Vitals:  Temp 36.2 °C (97.2 °F) (Temporal)   Wt 76.7 kg (169 lb)   BMI 23.91 kg/m²   General: Examination reveals a well-developed, well-nourished patient in no apparent distress.  The patient has no audible dysphonia, stridor or airway distress.  The patient is oriented, alert and responsive.    Oral Cavity:  The patient is able to open the mouth widely without trismus. He has a left lip scar from prior resection.  The floor of mouth and oral tongue are soft. There is a 1 cm erythematous area in the left posterior mandible with bony exposure.  The oral tongue is fully mobile and midline on protrusion.  The patient wears upper dentures.  Oropharynx: There are no mucosal abnormalities noted  "within the oropharynx.  The soft palate elevates symmetrically, the tonsils and base of tongue are normal to inspection and palpation.       Salivary glands: There are no palpable masses of the parotid or submandibular glands.   Neuro: Cranial nerves 2-12 are without obvious abnormality.  Neck: The neck is soft and symmetric.  There is no palpable adenopathy.  Extremities: no scars or injuries to arms or legs. Left allens equivocal     DATA REVIEWED:  Labs:  No results found for: \"WBC\", \"HGB\", \"HCT\", \"PLT\", \"NEUTROABS\"  Lab Results   Component Value Date    HGBA1C 5.4 01/17/2023        Radiology: I personally reviewed the CT from 7/1/2025 which demonstrated no overt bony erosion and no LAD    Discussions with other providers:   I discussed this case with Dr. Chacon who indicated he may not need to do a full segmental mandibulectomy depending on how cancer looked in OR.     Review of prior medical records: I reviewed the patient's medical records which included a clinic note from Dr. Chacon dated 6/20/25 in which he detailed the patient disease and resection plan.     Pathology: I reviewed the pathology report from the biopsy dated 5/22/25 which demonstrated invasive SCC        ASSESSMENT and PLAN:     Shola Carbajal is a 80 y.o. male with a history of SCC of the oral cavity who presents for resection with Dr Chacon and is referred for evaluation for reconstruction for his upcoming oral cavity composite resection surgery.     I have discussed options related to post-extirpative reconstruction today in great detail.  I reviewed options all along the reconstructive ‘ladder’ today, including risks, benefits and alternatives. Dr. Chacon will be doing the extirpative surgery, and I will be doing the reconstruction, so we each play unique roles in the patient’s management, and the patient understands this.  The optimal reconstruction will depend on the final defect size and characteristics, along with the status of the " flap vessels as well as the neck vessels, and I explained all this in detail.  For the anticipated post-resective defect in this case, I would recommend either left scapula if bone is needed, v left ALT or left RFFF (recheck allens) if soft tissue only. The options, alternatives, and benefits of each possible reconstruction including right/left were discussed in detail with the patient.     Today, we also discussed potential postoperative course which may include tracheotomy care/teaching, npo with tube feeds until otherwise cleared, frequent flap checks, and the need for ongoing PT/OT to maximize function, particularly at the donor site. Risks of surgery include but are not limited to: bleeding, infection, pain, scar, poor cosmesis, flap failure, need for further surgery, failure to improve, hematoma, fistula, speech/swallowing dysfunction, donor site dysfunction, prolonged wound healing, numbness/paresthesias, nerve injury, trismus, malocclusion, non-union/mal-union, hardware complications, cardiopulmonary complications and all the risks of prolonged surgery and general anesthesia.  All this was discussed in detail, and the patient expresses understanding and wishes to proceed with the surgery. All questions were answered to their satisfaction today.      Jamia Thomas MD

## 2025-07-01 NOTE — PROGRESS NOTES
HEAD AND NECK SURGERY CONSULT  [facility]    Referring Provider: Fili Chacon    HPI  I had the pleasure of seeing Shola aCrbajal as a consult today for reconstruction of Left oral cavity SCC. Patient presents alone.    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    Patient was originally evaluated for new cancer by his dentist after breaking a tooth while eating. He was then biopsied by an outside ENT and referred here for management. He recently saw my partner Dr Chacon with plans for resection and reconstruction 7/17. Had scans done today    Patient has a history of COPD, heart attack in 2007, and defibrillator placement. Right handed. No prior injury or surgery to shoulder, arms or legs other than knee replacement    Tobacco use: The patient  reports that he has been smoking cigarettes. He started smoking about 62 years ago. He has a 62.5 pack-year smoking history. He has been exposed to tobacco smoke. He has never used smokeless tobacco.   Alcohol Use: The patient  has no history on file for alcohol use.     Physical Examination  Vitals:  Temp 36.2 °C (97.2 °F) (Temporal)   Wt 76.7 kg (169 lb)   BMI 23.91 kg/m²   General: Examination reveals a well-developed, well-nourished patient in no apparent distress.  The patient has no audible dysphonia, stridor or airway distress.  The patient is oriented, alert and responsive.    Oral Cavity:  The patient is able to open the mouth widely without trismus. He has a left lip scar from prior resection.  The floor of mouth and oral tongue are soft. There is a 1 cm erythematous area in the left posterior mandible with bony exposure.  The oral tongue is fully mobile and midline on protrusion.  The patient wears upper dentures.  Oropharynx: There are no mucosal abnormalities noted  "within the oropharynx.  The soft palate elevates symmetrically, the tonsils and base of tongue are normal to inspection and palpation.       Salivary glands: There are no palpable masses of the parotid or submandibular glands.   Neuro: Cranial nerves 2-12 are without obvious abnormality.  Neck: The neck is soft and symmetric.  There is no palpable adenopathy.  Extremities: no scars or injuries to arms or legs. Left allens equivocal     DATA REVIEWED:  Labs:  No results found for: \"WBC\", \"HGB\", \"HCT\", \"PLT\", \"NEUTROABS\"  Lab Results   Component Value Date    HGBA1C 5.4 01/17/2023        Radiology: I personally reviewed the CT from 7/1/2025 which demonstrated no overt bony erosion and no LAD    Discussions with other providers:   I discussed this case with Dr. Chacon who indicated he may not need to do a full segmental mandibulectomy depending on how cancer looked in OR.     Review of prior medical records: I reviewed the patient's medical records which included a clinic note from Dr. Chacon dated 6/20/25 in which he detailed the patient disease and resection plan.     Pathology: I reviewed the pathology report from the biopsy dated 5/22/25 which demonstrated invasive SCC        ASSESSMENT and PLAN:     Shola Carbajal is a 80 y.o. male with a history of SCC of the oral cavity who presents for resection with Dr Chacon and is referred for evaluation for reconstruction for his upcoming oral cavity composite resection surgery.     I have discussed options related to post-extirpative reconstruction today in great detail.  I reviewed options all along the reconstructive ‘ladder’ today, including risks, benefits and alternatives. Dr. Chacon will be doing the extirpative surgery, and I will be doing the reconstruction, so we each play unique roles in the patient’s management, and the patient understands this.  The optimal reconstruction will depend on the final defect size and characteristics, along with the status of the " flap vessels as well as the neck vessels, and I explained all this in detail.  For the anticipated post-resective defect in this case, I would recommend either left scapula if bone is needed, v left ALT or left RFFF (recheck allens) if soft tissue only. The options, alternatives, and benefits of each possible reconstruction including right/left were discussed in detail with the patient.     Today, we also discussed potential postoperative course which may include tracheotomy care/teaching, npo with tube feeds until otherwise cleared, frequent flap checks, and the need for ongoing PT/OT to maximize function, particularly at the donor site. Risks of surgery include but are not limited to: bleeding, infection, pain, scar, poor cosmesis, flap failure, need for further surgery, failure to improve, hematoma, fistula, speech/swallowing dysfunction, donor site dysfunction, prolonged wound healing, numbness/paresthesias, nerve injury, trismus, malocclusion, non-union/mal-union, hardware complications, cardiopulmonary complications and all the risks of prolonged surgery and general anesthesia.  All this was discussed in detail, and the patient expresses understanding and wishes to proceed with the surgery. All questions were answered to their satisfaction today.      Jamia Thomas MD

## 2025-07-07 ENCOUNTER — APPOINTMENT (OUTPATIENT)
Dept: PREADMISSION TESTING | Facility: HOSPITAL | Age: 80
End: 2025-07-07
Payer: MEDICARE

## 2025-07-10 ENCOUNTER — PRE-ADMISSION TESTING (OUTPATIENT)
Dept: PREADMISSION TESTING | Facility: HOSPITAL | Age: 80
End: 2025-07-10
Payer: MEDICARE

## 2025-07-10 ENCOUNTER — ANESTHESIA EVENT (OUTPATIENT)
Dept: OPERATING ROOM | Facility: HOSPITAL | Age: 80
End: 2025-07-10
Payer: MEDICARE

## 2025-07-10 ENCOUNTER — HOSPITAL ENCOUNTER (OUTPATIENT)
Dept: CARDIOLOGY | Facility: HOSPITAL | Age: 80
Discharge: HOME | End: 2025-07-10
Payer: MEDICARE

## 2025-07-10 VITALS
OXYGEN SATURATION: 94 % | DIASTOLIC BLOOD PRESSURE: 63 MMHG | SYSTOLIC BLOOD PRESSURE: 97 MMHG | WEIGHT: 168 LBS | HEART RATE: 76 BPM | HEIGHT: 70 IN | BODY MASS INDEX: 24.05 KG/M2 | TEMPERATURE: 97.2 F

## 2025-07-10 DIAGNOSIS — Z41.9 SURGERY, ELECTIVE: ICD-10-CM

## 2025-07-10 DIAGNOSIS — Z41.9 SURGERY, ELECTIVE: Primary | ICD-10-CM

## 2025-07-10 DIAGNOSIS — C06.9 CANCER OF ORAL CAVITY (MULTI): ICD-10-CM

## 2025-07-10 LAB
ABO GROUP (TYPE) IN BLOOD: NORMAL
ANION GAP SERPL CALC-SCNC: 11 MMOL/L (ref 10–20)
ANTIBODY SCREEN: NORMAL
BUN SERPL-MCNC: 20 MG/DL (ref 6–23)
CALCIUM SERPL-MCNC: 9.9 MG/DL (ref 8.6–10.6)
CHLORIDE SERPL-SCNC: 103 MMOL/L (ref 98–107)
CO2 SERPL-SCNC: 28 MMOL/L (ref 21–32)
CREAT SERPL-MCNC: 1.27 MG/DL (ref 0.5–1.3)
EGFRCR SERPLBLD CKD-EPI 2021: 58 ML/MIN/1.73M*2
ERYTHROCYTE [DISTWIDTH] IN BLOOD BY AUTOMATED COUNT: 14.3 % (ref 11.5–14.5)
GLUCOSE SERPL-MCNC: 83 MG/DL (ref 74–99)
HCT VFR BLD AUTO: 44 % (ref 41–52)
HGB BLD-MCNC: 14.5 G/DL (ref 13.5–17.5)
MAGNESIUM SERPL-MCNC: 2 MG/DL (ref 1.6–2.4)
MCH RBC QN AUTO: 32.5 PG (ref 26–34)
MCHC RBC AUTO-ENTMCNC: 33 G/DL (ref 32–36)
MCV RBC AUTO: 99 FL (ref 80–100)
NRBC BLD-RTO: 0 /100 WBCS (ref 0–0)
PLATELET # BLD AUTO: 202 X10*3/UL (ref 150–450)
POTASSIUM SERPL-SCNC: 4.3 MMOL/L (ref 3.5–5.3)
PREALB SERPL-MCNC: 28.4 MG/DL (ref 18–40)
RBC # BLD AUTO: 4.46 X10*6/UL (ref 4.5–5.9)
RH FACTOR (ANTIGEN D): NORMAL
SODIUM SERPL-SCNC: 138 MMOL/L (ref 136–145)
TSH SERPL-ACNC: 1.9 MIU/L (ref 0.44–3.98)
WBC # BLD AUTO: 8.2 X10*3/UL (ref 4.4–11.3)

## 2025-07-10 PROCEDURE — 80048 BASIC METABOLIC PNL TOTAL CA: CPT

## 2025-07-10 PROCEDURE — 83735 ASSAY OF MAGNESIUM: CPT

## 2025-07-10 PROCEDURE — 84134 ASSAY OF PREALBUMIN: CPT

## 2025-07-10 PROCEDURE — 93010 ELECTROCARDIOGRAM REPORT: CPT | Performed by: INTERNAL MEDICINE

## 2025-07-10 PROCEDURE — 84443 ASSAY THYROID STIM HORMONE: CPT

## 2025-07-10 PROCEDURE — 99406 BEHAV CHNG SMOKING 3-10 MIN: CPT | Performed by: ANESTHESIOLOGY

## 2025-07-10 PROCEDURE — 36415 COLL VENOUS BLD VENIPUNCTURE: CPT

## 2025-07-10 PROCEDURE — 86850 RBC ANTIBODY SCREEN: CPT

## 2025-07-10 PROCEDURE — 99205 OFFICE O/P NEW HI 60 MIN: CPT | Performed by: ANESTHESIOLOGY

## 2025-07-10 PROCEDURE — 87081 CULTURE SCREEN ONLY: CPT

## 2025-07-10 PROCEDURE — 85027 COMPLETE CBC AUTOMATED: CPT

## 2025-07-10 RX ORDER — CHLORHEXIDINE GLUCONATE 40 MG/ML
1 SOLUTION TOPICAL DAILY
Qty: 25 ML | Refills: 0 | Status: ON HOLD | OUTPATIENT
Start: 2025-07-10 | End: 2025-07-17 | Stop reason: ALTCHOICE

## 2025-07-10 RX ORDER — CHLORHEXIDINE GLUCONATE ORAL RINSE 1.2 MG/ML
15 SOLUTION DENTAL DAILY
Qty: 30 ML | Refills: 0 | Status: SHIPPED | OUTPATIENT
Start: 2025-07-10 | End: 2025-07-22 | Stop reason: HOSPADM

## 2025-07-10 ASSESSMENT — DUKE ACTIVITY SCORE INDEX (DASI)
TOTAL_SCORE: 12.45
CAN YOU PARTICIPATE IN MODERATE RECREATIONAL ACTIVITIES LIKE GOLF, BOWLING, DANCING, DOUBLES TENNIS OR THROWING A BASEBALL OR FOOTBALL: NO
CAN YOU WALK A BLOCK OR TWO ON LEVEL GROUND: NO
CAN YOU HAVE SEXUAL RELATIONS: YES
CAN YOU CLIMB A FLIGHT OF STAIRS OR WALK UP A HILL: NO
CAN YOU DO MODERATE WORK AROUND THE HOUSE LIKE VACUUMING, SWEEPING FLOORS OR CARRYING GROCERIES: NO
CAN YOU WALK INDOORS, SUCH AS AROUND YOUR HOUSE: YES
CAN YOU DO HEAVY WORK AROUND THE HOUSE LIKE SCRUBBING FLOORS OR LIFTING AND MOVING HEAVY FURNITURE: NO
CAN YOU DO YARD WORK LIKE RAKING LEAVES, WEEDING OR PUSHING A MOWER: NO
CAN YOU DO LIGHT WORK AROUND THE HOUSE LIKE DUSTING OR WASHING DISHES: YES
DASI METS SCORE: 4.3
CAN YOU RUN A SHORT DISTANCE: NO
CAN YOU TAKE CARE OF YOURSELF (EAT, DRESS, BATHE, OR USE TOILET): YES
CAN YOU PARTICIPATE IN STRENOUS SPORTS LIKE SWIMMING, SINGLES TENNIS, FOOTBALL, BASKETBALL, OR SKIING: NO

## 2025-07-10 ASSESSMENT — ENCOUNTER SYMPTOMS
CONSTIPATION: 0
ABDOMINAL PAIN: 0
BRUISES/BLEEDS EASILY: 1
WHEEZING: 0
GASTROINTESTINAL NEGATIVE: 1
DIFFICULTY URINATING: 0
CONSTITUTIONAL NEGATIVE: 1
ENDOCRINE NEGATIVE: 1
NECK NEGATIVE: 1
CARDIOVASCULAR NEGATIVE: 1
NECK PAIN: 0
NECK SWELLING: 0
DYSPNEA AT REST: 0
PALPITATIONS: 0
DIARRHEA: 0
NEUROLOGICAL NEGATIVE: 1
COUGH: 1

## 2025-07-10 NOTE — PREPROCEDURE INSTRUCTIONS
Thank you for visiting [facility] ([facility]) today for your pre-procedure evaluation, you were seen by Dr. Hussein ([phone])    This summary includes instructions and information to aid you during your perioperative period.  Please read carefully. If you have any questions about your visit today, please call the number listed above.  If you become ill or have any changes to your health before your surgery, please contact your primary care provider and alert your surgeon.    Preparing for your Surgery       Exercises  Preoperative Deep Breathing Exercises  Why it is important to do deep breathing exercises before my surgery?  Deep breathing exercises strengthen your breathing muscles.  This helps you to recover after your surgery and decreases the chance of breathing complications.  How are the deep breathing exercises done?  Sit straight with your back supported.  Breathe in deeply and slowly through your nose. Your lower rib cage should expand and your abdomen may move forward.  Hold that breath for 3 to 5 seconds.  Breathe out through pursed lips, slowly and completely.  Rest and repeat 10 times every hour while awake.  Rest longer if you become dizzy or lightheaded.      Incentive Spirometer  You were provided with an incentive spirometer in [facility], please follow the below instructions.  An incentive spirometer is a device used before and after surgery to “exercise” your lungs.  It helps you to take deeper breaths to expand your lungs.  Below is an example of a basic incentive spirometer.  The device you receive may differ slightly but they all function the same.    Why do I need to use an incentive spirometer?  Using your incentive spirometer prepares your lungs for surgery and helps prevent lung problems after surgery.  How do I use my incentive spirometer?  When you're using your incentive spirometer, make sure to breathe through your mouth. If you breathe through your nose, the  incentive spirometer won't work properly. You can hold your nose if you have trouble.  If you feel dizzy at any time, stop and rest. Try again at a later time.  Follow the steps below:  Set up your incentive spirometer, expand the flexible tubing and connect to the outlet.  Sit upright in a chair or bed. Hold the incentive spirometer at eye level.   Put the mouthpiece in your mouth and close your lips tightly around it. Slowly breathe out (exhale) completely.  Breathe in (inhale) slowly through your mouth as deeply as you can. As you take a breath, you will see the piston rise inside the large column. While the piston rises, the indicator should move upwards. It should stay in between the 2 arrows (see Figure).  Try to get the piston as high as you can, while keeping the indicator between the arrows.   If the indicator doesn't stay between the arrows, you're breathing either too fast or too slow.  When you get it as high as you can, hold your breath for 10 seconds, or as long as possible. While you're holding your breath, the piston will slowly fall to the base of the spirometer.  Once the piston reaches the bottom of the spirometer, breathe out slowly through your mouth. Rest for a few seconds.  Repeat 10 times. Try to get the piston to the same level with each breath.  Repeat every hour while awake  You can carefully clean the outside of the mouthpiece with an alcohol wipe or soap and water.      Preoperative Brain Exercises    What are brain exercises?  A brain exercise is any activity that engages your thinking (cognitive) skills.    What types of activities are considered brain exercises?  Jigsaw puzzles, crossword puzzles, word jumble, memory games, word search, and many more.  Many can be found free online or on your phone via a mobile ag.    Why should I do brain exercises before my surgery?  More recent research has shown brain exercise before surgery can lower the risk of postoperative delirium  (confusion) which can be especially important for older adults.  Patients who did brain exercises for 5 to 10 hours the days before surgery, cut their risk of postoperative delirium in half up to 1 week after surgery.    Sit-to-Stand Exercise    What is the sit-to-stand exercise?  The sit-to-stand exercise strengthens the muscles of your lower body and muscles in the center of your body (core muscles for stability) helping to maintain and improve your strength and mobility.  How do I do the sit-to-stand exercise?  The goal is to do this exercise without using your arms or hands.  If this is too difficult, use your arms and hands or a chair with armrests to help slowly push yourself to the standing position and lower yourself back to the sitting position. As the movement becomes easier use your arms and hands less.    Steps to the sit-to-stand exercise  Sit up tall in a sturdy chair, knees bent, feet flat on the floor shoulder-width apart.  Shift your hips/pelvis forward in the chair to correctly position yourself for the next movement.  Lean forward at your hips.  Stand up straight putting equal weight on both feet.  Check to be sure you are properly aligned with the chair, in a slow controlled movement sit back down.  Repeat this exercise 10-15 times.  If needed you can do it fewer times until your strength improves.  Rest for 1 minute.  Do another 10-15 sit-to-stand exercises.  Try to do this in the morning and evening.        Instructions    Preoperative Fasting Guidelines    Why must I stop eating and drinking near surgery time?  With sedation, food or liquid in your stomach can enter your lungs causing serious complications  Food can increase nausea and vomiting  When do I need to stop eating and drinking before my surgery?      Do not eat any food after midnight the night before your surgery/procedure. You may have up to 13.5 ounces of clear liquid until TWO hours before your instructed arrival time to the  hospital.  This includes water, black tea/coffee, (no milk or cream) apple juice, and electrolyte drinks (Gatorade). You may chew gum until TWO hours before your surgery/procedure            Simple things you can do to help prevent blood clots     Blood clots are blockages that can form in the body's veins. When a blood clot forms in your deep veins, it may be called a deep vein thrombosis, or DVT for short. Blood clots can happen in any part of the body where blood flows, but they are most common in the arms and legs. If a piece of a blood clot breaks free and travels to the lungs, it is called a pulmonary embolus (PE). A PE can be a very serious problem.         Being in the hospital or having surgery can raise your chances of getting a blood clot because you may not be well enough to move around as much as you normally do.         Ways you can help prevent blood clots in the hospital       Wearing SCDs  SCDs stands for Sequential Compression Devices.   SCDs are special sleeves that wrap around your legs. They attach to a pump that fills them with air to gently squeeze your legs every few minutes.  This helps return the blood in your legs to your heart.   SCDs should only be taken off when walking or bathing. SCDs may not be comfortable, but they can help save your life.              Pump SCD leg sleeves  Wearing compression stockings - if your doctor orders them. These special snug-fitting stockings gently squeeze your legs to help blood flow.       Walking. Walking helps move the blood in your legs.   If your doctor says it is ok, try walking the halls at least   5 times a day. Ask us to help you get up, so you don't fall.      Taking any blood-thinning medicines your doctor orders.              Ways you can help prevent blood clots at home         Wearing compression stockings - if your doctor orders them.   Walking - to help move the blood in your legs.    Taking any blood-thinning medicines your doctor  orders.      Signs of a blood clot or PE    Tell your doctor or nurse right away if you have any of the problems listed below.         If you are at home, seek medical care right away. Call 911 for chest pain or problems breathing.            Signs of a blood clot (DVT) - such as pain, swelling, redness, or warmth in your arm or legs.  Signs of a pulmonary embolism (PE) - such as chest pain or feeling short of breath      Tobacco and Alcohol;  Do not drink alcohol or smoke within 24 hours of surgery.  It is best to quit smoking for as long as possible before any surgery or procedure.    Quitting Smoking; Recognizing Dangerous Situations:  1-Alcohol use during the first month after quitting, 2-Being around smoke or someone who smokes 3-Times situation routinely smoked  4-Triggers-car, breaks, coffee, when awakening, social events  Coping Skills: 1-Learning new ways to manage stress 2-Exercising 3-Relaxation breathing   4-Change routines 5-Distraction techniques    Websites:  Smoke-Free - offers free text messages and an ag to help you quit. Info includes eating and mood issues that may come with quitting. There is a Live Helpline to talk to an expert. Go to smokefree.gov; Become an Ex-Smoker - the free EX Plan is based on scientific research and useful advice from ex-smokers. It isn't just about quitting smoking.  It's about re-learning life without cigarettes using a 3-step program.  Go to becomeanex.org   Centers for Disease Control offer many suggestions for helping you quit. Includes a Quit Guide and real-life stories. There are sections for specific groups such as LGBT, , different ethnic groups, and pregnant women.  Go to cdc.gov/tobacco/campaign/tips    Other Resources:  [place] Tobacco Quit Line - call 1-800-QUIT-NOW or 3-655-781-7707.  [facility] 2-1-1 - to find local programs and resources. Call 211 or go to 211.org.  [facility] Tobacco Cessation Program - call 082-690-2220.  American Lung  "Association offers classes for quitting smoking. Some places may charge a fee. For a list of classes, go to lung.org or call [employer] and [facility].     Some things to think about:; The health benefits of quitting smoking can help most of the major parts of your body. There is no safe amount of cigarette smoke. Quitting smoking can add years to your life. When you quit, you'll also protect your loved ones from dangerous secondhand smoke. Make a plan, join a support group, and talk to your physician to assist in quitting smoking.                                                                                                                   Other Instructions  Why did I have my nose, under my arms, and groin swabbed? The purpose of the swab is to identify Staphylococcus aureus inside your nose or on your skin.  The swab was sent to the laboratory for culture.  A positive swab/culture for Staphylococcus aureus is called colonization or carriage.   What is Staphylococcus aureus? Staphylococcus aureus, also known as \"staph\", is a germ found on the skin or in the nose of healthy people.  Sometimes Staphylococcus aureus can get into the body and cause an infection.  This can be minor (such as pimples, boils, or other skin problems).  It might also be serious (such as a blood infection, pneumonia, or a surgical site infection). What is Staphylococcus aureus colonization or carriage? Colonization or carriage means that a person has the germ but is not sick from it.  These bacteria can be spread on the hands or when breathing or sneezing. How is Staphylococcus aureus spread? It is most often spread by close contact with a person or item that carries it. What happens if my culture is positive for Staphylococcus aureus? Your doctor/medical team will use this information to guide any antibiotic treatment which may be necessary.  Regardless of the culture results, we will clean the inside of your nose with a betadine swab just before " you have your surgery. Will I get an infection if I have Staphylococcus aureus in my nose or on my skin? Anyone can get an infection with Staphylococcus aureus.  However, the best way to reduce your risk of infection is to follow the instructions provided to you for the use of your CHG soap and dental rinse.  , Body Wash; What is a home preoperative antibacterial shower? This shower is a way of cleaning the skin with a germ-killing solution before surgery.  The solution contains chlorhexidine, commonly known as CHG.  CHG is a skin cleanser with germ-killing ability.  Let your doctor know if you are allergic to chlorhexidine. Why do I need to take a preoperative antibacterial shower? Skin is not sterile.  It is best to try to make your skin as free of germs as possible before surgery.  Proper cleansing with a germ-killing soap before surgery can lower the number of germs on your skin.  This helps to reduce the risk of infection at the surgical site.  Following the instructions listed below will help you prepare your skin for surgery.   How do I use the solution? Steps:  Begin using your CHG soap 5 days before your scheduled surgery on ___________.   First, wash and rinse your hair using the CHG soap. Keep CHG soap away from ear canals and eyes.  Rinse completely, do not condition.  Hair extensions should be removed. , Oral/Dental Rinse: What is oral/dental rinse?  It is a mouthwash. It is a way of cleaning the mouth with a germ-killing solution before your surgery.  The solution contains chlorhexidine, commonly known as CHG. It is used inside the mouth to kill a bacteria known as Staphylococcus aureus.  Let your doctor know if you are allergic to Chlorhexidine. Why do I need to use CHG oral/dental rinse? The CHG oral/dental rinse helps to kill a bacteria in your mouth known as Staphylococcus aureus.  This reduces the risk of infection at the surgical site.  Using your CHG oral/dental rinse STEPS: Use your CHG  oral/dental rinse after you brush your teeth the night before (at bedtime) and the morning of your surgery.  Follow all directions on your prescription label.  Use the cap on the container to measure 15 ml.  Swish (gargle if you can) the mouthwash in your mouth for at least 30 seconds, (do not swallow) and spit out.  After you use your CHG rinse, do not rinse your mouth with water, drink or eat.  Please refer to the prescription label for the appropriate time to resume oral intake What side effects might I have using the CHG oral/dental rinse? CHG rinse will stick to plaque on the teeth.  Brush and floss just before use.  Teeth brushing will help avoid staining of plaque during use.       The Week before Surgery        Seven days before Surgery  Check your [facility] medication instructions  Do the exercises provided to you by [facility]   Arrange for a responsible, adult licensed  to take you home after surgery and stay with you for 24 hours.  You will not be permitted to drive yourself home if you have received any anesthetic/sedation  Six days before surgery  Check your [facility] medication instructions  Do the exercises provided to you by [facility]   Start using Chlorhexidene (CHG) body wash if prescribed  Five days before surgery  Check your [facility] medication instructions  Do the exercises provided to you by [facility]   Continue to use CHG body wash if prescribed  Three days before surgery  Check your [facility] medication instructions  Do the exercises provided to you by [facility]   Continue to use CHG body wash if prescribed  Two days before surgery  Check your [facility] medication instructions  Do the exercises provided to you by [facility]   Continue to use CHG body wash if prescribed    The Day before Surgery       Check your [facility] medication and all other [facility] instructions including when to stop eating and drinking  You will be called with your arrival time for surgery in the late afternoon.  If you do not receive a call please reach out to your surgeon's  office.  Do not smoke or drink 24 hours before surgery  Prepare items to bring with you to the hospital  Shower with your chlorhexidine wash if prescribed  Brush your teeth and use your chlorhexidine dental rinse if prescribed    The Day of Surgery       Check your [facility] medication instructions  Ensure you follow the instructions for when to stop eating and drinking  Shower, if prescribed use CHG.  Do not apply any lotions, creams, moisturizers, perfume or deodorant  Brush your teeth and use your CHG dental rinse if prescribed  Wear loose comfortable clothing  Avoid make-up  Remove  jewelry and piercings, consider professional piercing removal with a plastic spacer if needed  Bring photo ID and Insurance card  Bring an accurate medication list that includes medication dose, frequency and allergies  Bring a copy of your advanced directives (will, health care power of )  Bring any devices and controllers as well as medical devices you have been provided with for surgery (CPAP, slings, braces, etc.)  Dentures, eyeglasses, and contacts will be removed before surgery, please bring cases for contacts or glasses

## 2025-07-10 NOTE — CPM/PAT H&P
[facility]/PAT Evaluation       Name: Shola Carbajal (Shola FRANCES Solomon)  /Age: 1945/80 y.o.     In-Person       Chief Complaint: direct laryngoscopy, flexible bronchoscopy, gastro esophagoscopy and insertion of a feeding tube (PEG), possible tracheotomy, left composite resection, mandibular reconstruction with a locking reconstruction plate, and free tissue reconstruction with tissue from the leg, or back, and possible skin graft    HPI 80 y.o.  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  PMHX includes h/o MI s/p CABG, HFrEF, COPD, smoker, CAD, HTN .  Presents to [facility] today for perioperative risk stratification and optimization    Medical History[1]    Surgical History[2]    Patient  has no history on file for sexual activity.    Family History[3]    Allergies[4]    Prior to Admission medications    Medication Sig Start Date End Date Taking? Authorizing Provider   albuterol 90 mcg/actuation inhaler Inhale. 25   Historical Provider, MD   alendronate (Fosamax) 70 mg tablet Take 1 tablet (70 mg) by mouth every 7 days.    Historical Provider, MD   aspirin 81 mg EC tablet  24   Historical Provider, MD   atorvastatin (Lipitor) 40 mg tablet Take 1 tablet (40 mg) by mouth once daily at bedtime. 24   Historical Provider, MD   budesonide-glycopyr-formoterol (BREZTRI) 160-9-4.8 mcg/actuation HFA aerosol inhaler Inhale. 10/17/23   Historical Provider, MD   carvedilol (Coreg) 6.25 mg tablet     Historical Provider, MD   cholecalciferol (Vitamin D-3) 50 mcg (2,000 units) tablet Take by mouth. 24   Historical Provider, MD   furosemide (Lasix) 20 mg tablet Take 3 tablets (60 mg) by mouth.  4/23/24   Historical Provider, MD   hydroCHLOROthiazide (Microzide) 12.5 mg capsule     Historical Provider, MD   imiquimod (Aldara) 5 % cream Apply topically. 5/15/25   Historical Provider, MD   ketoconazole (NIZOral) 2 % cream Apply topically. 6/16/25   Historical Provider, MD   metoprolol succinate XL (Toprol-XL) 25 mg 24 hr tablet Take 0.5 tablets (12.5 mg) by mouth. 4/23/24   Historical Provider, MD   nitroglycerin (Nitrostat) 0.4 mg SL tablet Place 1 tablet (0.4 mg) under the tongue.    Historical Provider, MD   risedronate (Actonel) 150 mg tablet Take 1 tablet (150 mg) by mouth. 7/24/24   Historical Provider, MD   sacubitriL-valsartan (Entresto) 24-26 mg tablet Take by mouth. 2/22/24   Historical Provider, MD   sildenafil (Viagra) 100 mg tablet Take 0.5 tablets (50 mg) by mouth. 10/17/23   Historical Provider, MD   spironolactone (Aldactone) 25 mg tablet Take 0.5 tablets (12.5 mg) by mouth. 4/23/24   Historical Provider, MD        PAT ROS:   Constitutional:   neg    Neuro/Psych:   neg    Eyes:   Ears:   Nose:   Mouth:   neg     no mouth pain   no oral bleeding   no mouth lesions  Throat:   neg     no throat pain  Neck:   neg     no neck pain   neck swelling  Cardio:   neg     no chest pain   no palpitations   no dyspnea  Respiratory:    cough   no wheezing  Endocrine:   neg    GI:   neg     no abdominal pain   no constipation   no diarrhea  :    no difficulty urinating  Musculoskeletal:   Hematologic:    bruises/bleeds easily  Skin:      Physical Exam  Constitutional:       Appearance: Normal appearance.   HENT:      Head: Normocephalic and atraumatic.   Neck:      Vascular: No carotid bruit.   Cardiovascular:      Rate and Rhythm: Normal rate and regular rhythm.      Pulses: Normal pulses.      Heart sounds: Normal heart sounds.   Pulmonary:      Effort: Pulmonary effort is normal.      Breath sounds: Normal breath sounds.   Abdominal:      General: Bowel sounds are normal.      Palpations: Abdomen is  soft.   Musculoskeletal:         General: Normal range of motion.      Cervical back: Normal range of motion.   Skin:     General: Skin is warm and dry.      Findings: Bruising present.   Neurological:      General: No focal deficit present.      Mental Status: He is alert and oriented to person, place, and time. Mental status is at baseline.   Psychiatric:         Mood and Affect: Mood normal.         Behavior: Behavior normal.         Thought Content: Thought content normal.          PAT AIRWAY:   Airway:     Mallampati::  I    TM distance::  >3 FB    Neck ROM::  Full   upper dentures and partials         Testing/Diagnostic:     Patient Specialist/PCP:     Visit Vitals  Smoking Status Every Day       DASI Risk Score    No data to display       Caprini DVT Assessment    No data to display       Modified Frailty Index    No data to display       [id number] Stroke Risk Points  Current as of just now        N/A 0 to 9 Points:      Last Change: N/A          The [id number] risk score (Lip GH, et al. 2009. © 2010 American College [employer] Chest Physicians) quantifies the risk of stroke for a patient with atrial fibrillation. For patients without atrial fibrillation or under the age of 18 this score appears as N/A. Higher score values generally indicate higher risk of stroke.        This score is not applicable to this patient. Components are not calculated.          Revised Cardiac Risk Index    No data to display       Apfel Simplified Score    No data to display       Risk Analysis Index Results This Encounter    No data found in the last 10 encounters.       Prodigy: High Risk  Total Score: 20              Prodigy Age Score      Prodigy Gender Score          ARISCAT Score for Postoperative Pulmonary Complications    No data to display       Mata Perioperative Risk for Myocardial Infarction or Cardiac Arrest (TILA)    No data to display           Assessment and Plan     History of Present Illness     80 y.o.  male   scheduled for direct laryngoscopy, flexible bronchoscopy, gastro esophagoscopy and insertion of a feeding tube (PEG), possible tracheotomy, left composite resection, mandibular reconstruction with a locking reconstruction plate, and free tissue reconstruction with tissue from the leg, or back, and possible skin graft on 7/17/25 with Dr. Chacon for oral cancer.  PMHX includes h/o MI s/p CABG, HFrEF, COPD, smoker, CAD, HTN .  Presents to [facility] today for perioperative risk stratification and optimization      Neuro   Hx of PTSD  No Hx of seizures or strokes    No neurologic diagnosis, however, the patient is at increased risk for perioperative delirium secondary to  age, type and duration of surgery, Patient instructed on and provided cognitive exercises  Patient is at increased risk for perioperative CVA secondary to  cardiac disease, HTN, increased age, operative time > 2.5 hours    HEENT   Squamous cell carcinoma left buccal mucosa       Cardiovascular   [facility]  CAD, S/P MI 2/2007 S/p PCI RCA, repeat MI that night and had 4 stents placed currently on ASA  HFrEF currently on entresto, spironolactone and lasix   HLD currently on atorvastatin   Cardiac defibrillator in situ 10/17/2023   Hx of atrial fibrillation, not clear if paroxysmal     [facility] for [facility], cath, EKG and stress test if he had one. We did get an EKG done in clinic today.    METS: 4.3  RCRI: 2 points, 10.1% risk for postoperative MACE   TILA: 0.7% risk for 30-day postoperative MACE  EKG - 7/10/25  - NSR with PVCs, HR 65, , abnormal ECG and hx of abnormal ECG (requested records [facility])  ** ECG 12/2024: SR @ 72 bpm; 1st deg AVB; Anshu septal infarct; QRS 74 msec.   - Hx of LBBB noted    ECHO 4/14/2023:  Technically limited study. poor quality study  Left ventricular systolic function is moderate to severely reduced.  The Ejection Fraction estimate is 30-35%  There is inferior  "wall akinesis.  The right ventricle is moderately dilated.  The right ventricular systolic function is moderate to severely reduced.  There is mild mitral regurgitation.  There is mild tricuspid regurgitation.  Mild aortic regurgitation.  Mild pulmonic valvular regurgitation.  Indeterminate rhythm, recommed ECG     Heart Cath 10/2021: ( For drop in LVEF and DANGELO)  RA 8  PA 26/10 m14  PAW m12  Rt dominant system  Diffusely calcific coronaries  LMCA: Calcific irregularities  LAD: Heavily clacific vessel with 80-90% midsegment lesion, at the bifurcation   into a diagonal. LAD stops short of apex  LCx: 30% ostial lesion and nonobstructive irregularities  RCA: Toally occluded at midsegment in-stent 100%, with occluded stents into   PDA Distal small caliber PLV is seen filling from left     Vessel disease was discussed with Interventional Dr Duff. \"Unlikely benefit obtained by LAD PCI will justify risk. No good targets for CABG. Will continue med optimization at this time.\"    I have contacted Dr. Chacon to let him know of the patients cardiac status and what was discussed in clinic today. He is high risk for surgery given multiple comorbidities, length of surgery and restenosis of bare metal stents. Patient and wife are aware of the risk, wife with concerns regarding surgery, however patient would like to proceed knowing risk.     Pt active with HF clinic, was on empaglifozin and Reporting rash in genital region so medication was held.   Patient will need ICD interrogation morning of surgery.    Pulmonary   Hx of COPD on albuterol and budesonide-glycopyr-formoterol inhaler  Mild JAE - did not want to try CPAP. Was using Bongo but not recently   Incidental lung nodule found on imaging    CT Chest 6/04/25  Impression:  1. A couple of new punctate nodules are seen in the left upper  lobe. Other nodules are stable. There is a new focal opacity in the right middle lobe. This could be related to atelectasis, although a " developing infectious   process is not excluded. Follow-up is recommended.   2. No pathologically enlarged mediastinal or hilar lymph nodes  are seen. There are findings consistent with remote granulomatous  disease.     PFT 10/2021:  Tests were acceptable and reproducible as per American Thoracic Society  standards. Spirometry findings are consistent with a moderate obstructive ventilatory defect. The total lung capacity is within normal limits. Increased ratio of residual volume to total lung capacity is consistent with gas trapping. The diffusion capacity for carbon monoxide is normal.     Patient is at increased risk of perioperative complications secondary to age > 60, COPD, Tobacco abuse, heart failure, major surgery, duration of surgery > 2 hours    ARISCAT: 26-44 points, 13.3% risk of in-hospital postoperative pulmonary complication  PRODIGY: High risk for opioid-induced respiratory depression  Smoking cessation discussed with patient, patient also provided cessation education/hotline handout, PumAstoriary toilet education discussed, patient also provided deep breathing exercises and incentive spirometry educational handout    Patient currently a smoker and refuses to quit and no intention for smoking cessation    Renal   No renal diagnosis, however patient is at increase risk for perioperative renal complications secondary to  Age equal to or greater than 56, EF less than 40% , HTN, COPD, use of an ace, arb, or NSAID      Endocrine   Hx of hypothyroidism currently controlled on levothyroxine       Hematologic   No hx of DVT or PE     No hematologic diagnosis, however patient is at an increased risk for DVT  Caprini Score 10, patient at High risk for perioperative DVT.  Patient provided VTE education/handout.    Gastrointestinal   GI bleed 11/2013. 6/2019 - pt had Push enteroscopy for AYO. 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 Was on PO iron without adequate response -> IV iron.  - He has not been taking antiplatelets  since his GI bleed 2013 due to AVM. Had   APC of AVM 2019. No further bleed since. Hct has remained stable.     Hx of GERD well controlled on omeprazole    Eat-10 score 0    Infectious Disease   Penile rash from jardiance; has since d/c'd the jardiance since mid June and rash is clearing up     Prescription provided for CHG body wash and dental rinse. CHG use instructions reviewed and provided to patient.  Staph screen collected      Musculoskeletal   Osteopenia 11/12/2024 ANN MARIE LOPEZ  1. Alendronate started in 2018. Plan is for 5  years of therapy  2. Endo plan is to repeat DEXA in 25/26     Anesthesia/Airway   No anesthesia complications    After last cataract surgery, was hypotensive to systolics 50s/30s. Was under MAC for cataract surgery in 12/2024    Labs ordered: T&S, CMC, BMP, Mg    Medication, NPO, and all other CPM instructions were reviewed with the patient.  All patient questions were addressed.    Patient seen and staffed with Dr. Bustamante             [1] No past medical history on file.  [2] No past surgical history on file.  [3] No family history on file.  [4]   Allergies  Allergen Reactions    Cat Dander Anaphylaxis    Clopidogrel GI bleeding     GI BLEED    Enoxaparin GI bleeding     GI BLEED    Heparin GI bleeding     GI BLEED

## 2025-07-11 LAB — STAPHYLOCOCCUS SPEC CULT: NORMAL

## 2025-07-14 LAB
ATRIAL RATE: 250 BPM
Q ONSET: 221 MS
QRS COUNT: 11 BEATS
QRS DURATION: 82 MS
QT INTERVAL: 440 MS
QTC CALCULATION(BAZETT): 457 MS
QTC FREDERICIA: 451 MS
R AXIS: 90 DEGREES
T AXIS: 40 DEGREES
T OFFSET: 441 MS
VENTRICULAR RATE: 65 BPM

## 2025-07-14 PROCEDURE — 93005 ELECTROCARDIOGRAM TRACING: CPT

## 2025-07-16 RX ORDER — OMEPRAZOLE 20 MG/1
1 CAPSULE, DELAYED RELEASE ORAL DAILY
COMMUNITY

## 2025-07-16 RX ORDER — GUAIFENESIN 600 MG/1
1 TABLET, EXTENDED RELEASE ORAL 2 TIMES DAILY PRN
COMMUNITY

## 2025-07-16 NOTE — PROGRESS NOTES
Pharmacy Medication History Review    Shola Carbajal is a 80 y.o. male who is planned to be admitted for Cancer of oral cavity (Multi). Pharmacy called the patient prior to their scheduled procedure and reviewed the patient's tkyjt-is-fnohymaij medications for accuracy.    Medications ADDED:  Guaifenesin 600mg  Omeprazole 20mg  Medications CHANGED:  none  Medications REMOVED:   Fosamax 70mg  Carvedilol 6.25mg  Hydrochlorothiazide 12.5mg    Please review updated prior to admission medication list and comments regarding how patient may be taking medications differently by going to Admission tab --> Admission Orders --> Admit Orders / Review prior to admission medications.     Preferred pharmacy, last doses of medications, and allergies to be confirmed with patient by nursing the day of procedure.     Sources used to complete the med history include:  [facility]  Pharmacy dispense history  Patient Interview Good historian  Chart Review  Care Everywhere     Below are additional concerns with the patient's PTA list.  [facility]  Patient states they are taking #1 capsule of omeprazole 20mg once daily. L.F. 07/19/25 #90/[facility]d [facility]  Patient states they are taking #1 tablet of guaifenesin 600mg twice daily if needed. L.F. 06/16/25 #180/90d @ [facility]    Aisha Monterroso CPhT  Please reach out via Secure Chat for questions

## 2025-07-17 ENCOUNTER — HOSPITAL ENCOUNTER (INPATIENT)
Facility: HOSPITAL | Age: 80
LOS: 5 days | Discharge: HOME HEALTH | DRG: 012 | End: 2025-07-22
Attending: OTOLARYNGOLOGY | Admitting: STUDENT IN AN ORGANIZED HEALTH CARE EDUCATION/TRAINING PROGRAM
Payer: MEDICARE

## 2025-07-17 ENCOUNTER — APPOINTMENT (OUTPATIENT)
Dept: CARDIOLOGY | Facility: HOSPITAL | Age: 80
DRG: 012 | End: 2025-07-17
Payer: MEDICARE

## 2025-07-17 ENCOUNTER — ANESTHESIA (OUTPATIENT)
Dept: OPERATING ROOM | Facility: HOSPITAL | Age: 80
End: 2025-07-17
Payer: MEDICARE

## 2025-07-17 DIAGNOSIS — R13.10 DYSPHAGIA, UNSPECIFIED TYPE: ICD-10-CM

## 2025-07-17 DIAGNOSIS — I50.22 CHRONIC SYSTOLIC CONGESTIVE HEART FAILURE: ICD-10-CM

## 2025-07-17 DIAGNOSIS — Z95.810 AICD (AUTOMATIC CARDIOVERTER/DEFIBRILLATOR) PRESENT: ICD-10-CM

## 2025-07-17 DIAGNOSIS — C06.9 CANCER OF ORAL CAVITY (MULTI): Primary | ICD-10-CM

## 2025-07-17 PROBLEM — I73.9 PERIPHERAL VASCULAR DISEASE: Status: ACTIVE | Noted: 2025-07-17

## 2025-07-17 PROBLEM — E03.9 HYPOTHYROIDISM: Status: ACTIVE | Noted: 2025-07-17

## 2025-07-17 PROBLEM — Z95.5 STENTED CORONARY ARTERY: Status: ACTIVE | Noted: 2025-07-17

## 2025-07-17 PROBLEM — G47.33 OSA (OBSTRUCTIVE SLEEP APNEA): Status: ACTIVE | Noted: 2025-07-17

## 2025-07-17 PROBLEM — I10 HTN (HYPERTENSION): Status: ACTIVE | Noted: 2025-07-17

## 2025-07-17 PROBLEM — K92.2 GI BLEED: Status: ACTIVE | Noted: 2025-07-17

## 2025-07-17 PROBLEM — R91.8 PULMONARY NODULES: Status: ACTIVE | Noted: 2025-07-17

## 2025-07-17 PROBLEM — I25.10 CAD (CORONARY ARTERY DISEASE): Status: ACTIVE | Noted: 2025-07-17

## 2025-07-17 PROBLEM — J44.9 CHRONIC OBSTRUCTIVE PULMONARY DISEASE (MULTI): Status: ACTIVE | Noted: 2025-07-17

## 2025-07-17 PROBLEM — I21.9 MI (MYOCARDIAL INFARCTION) (MULTI): Status: ACTIVE | Noted: 2025-07-17

## 2025-07-17 PROBLEM — K21.9 GASTROESOPHAGEAL REFLUX DISEASE: Status: ACTIVE | Noted: 2025-07-17

## 2025-07-17 PROBLEM — I50.9 CHF (CONGESTIVE HEART FAILURE): Status: ACTIVE | Noted: 2025-07-17

## 2025-07-17 LAB
ABO GROUP (TYPE) IN BLOOD: NORMAL
ALBUMIN SERPL BCP-MCNC: 3.9 G/DL (ref 3.4–5)
ANION GAP BLDA CALCULATED.4IONS-SCNC: 10 MMO/L (ref 10–25)
ANION GAP BLDA CALCULATED.4IONS-SCNC: 12 MMO/L (ref 10–25)
ANION GAP BLDA CALCULATED.4IONS-SCNC: 9 MMO/L (ref 10–25)
ANION GAP SERPL CALC-SCNC: 15 MMOL/L (ref 10–20)
BASE EXCESS BLDA CALC-SCNC: -2.7 MMOL/L (ref -2–3)
BASE EXCESS BLDA CALC-SCNC: -2.8 MMOL/L (ref -2–3)
BASE EXCESS BLDA CALC-SCNC: -4.3 MMOL/L (ref -2–3)
BODY TEMPERATURE: 37 DEGREES CELSIUS
BUN SERPL-MCNC: 27 MG/DL (ref 6–23)
CA-I BLDA-SCNC: 1.12 MMOL/L (ref 1.1–1.33)
CA-I BLDA-SCNC: 1.2 MMOL/L (ref 1.1–1.33)
CA-I BLDA-SCNC: 1.25 MMOL/L (ref 1.1–1.33)
CALCIUM SERPL-MCNC: 8.7 MG/DL (ref 8.6–10.6)
CHLORIDE BLDA-SCNC: 103 MMOL/L (ref 98–107)
CHLORIDE BLDA-SCNC: 107 MMOL/L (ref 98–107)
CHLORIDE BLDA-SCNC: 109 MMOL/L (ref 98–107)
CHLORIDE SERPL-SCNC: 104 MMOL/L (ref 98–107)
CO2 SERPL-SCNC: 25 MMOL/L (ref 21–32)
CREAT SERPL-MCNC: 1.44 MG/DL (ref 0.5–1.3)
EGFRCR SERPLBLD CKD-EPI 2021: 50 ML/MIN/1.73M*2
ERYTHROCYTE [DISTWIDTH] IN BLOOD BY AUTOMATED COUNT: 14.6 % (ref 11.5–14.5)
GLUCOSE BLD MANUAL STRIP-MCNC: 126 MG/DL (ref 74–99)
GLUCOSE BLDA-MCNC: 116 MG/DL (ref 74–99)
GLUCOSE BLDA-MCNC: 128 MG/DL (ref 74–99)
GLUCOSE BLDA-MCNC: 85 MG/DL (ref 74–99)
GLUCOSE SERPL-MCNC: 119 MG/DL (ref 74–99)
HCO3 BLDA-SCNC: 22.1 MMOL/L (ref 22–26)
HCO3 BLDA-SCNC: 23.2 MMOL/L (ref 22–26)
HCO3 BLDA-SCNC: 24.5 MMOL/L (ref 22–26)
HCT VFR BLD AUTO: 30.1 % (ref 41–52)
HCT VFR BLD EST: 33 % (ref 41–52)
HCT VFR BLD EST: 36 % (ref 41–52)
HCT VFR BLD EST: 42 % (ref 41–52)
HGB BLD-MCNC: 9.6 G/DL (ref 13.5–17.5)
HGB BLDA-MCNC: 10.9 G/DL (ref 13.5–17.5)
HGB BLDA-MCNC: 12.1 G/DL (ref 13.5–17.5)
HGB BLDA-MCNC: 13.9 G/DL (ref 13.5–17.5)
INHALED O2 CONCENTRATION: 40 %
LACTATE BLDA-SCNC: 0.5 MMOL/L (ref 0.4–2)
LACTATE BLDA-SCNC: 0.6 MMOL/L (ref 0.4–2)
LACTATE BLDA-SCNC: 0.8 MMOL/L (ref 0.4–2)
MAGNESIUM SERPL-MCNC: 1.86 MG/DL (ref 1.6–2.4)
MCH RBC QN AUTO: 32.2 PG (ref 26–34)
MCHC RBC AUTO-ENTMCNC: 31.9 G/DL (ref 32–36)
MCV RBC AUTO: 101 FL (ref 80–100)
NRBC BLD-RTO: 0 /100 WBCS (ref 0–0)
OXYHGB MFR BLDA: 93.8 % (ref 94–98)
OXYHGB MFR BLDA: 94.6 % (ref 94–98)
OXYHGB MFR BLDA: 95.3 % (ref 94–98)
PCO2 BLDA: 44 MM HG (ref 38–42)
PCO2 BLDA: 45 MM HG (ref 38–42)
PCO2 BLDA: 51 MM HG (ref 38–42)
PH BLDA: 7.29 PH (ref 7.38–7.42)
PH BLDA: 7.3 PH (ref 7.38–7.42)
PH BLDA: 7.33 PH (ref 7.38–7.42)
PHOSPHATE SERPL-MCNC: 3.3 MG/DL (ref 2.5–4.9)
PLATELET # BLD AUTO: 148 X10*3/UL (ref 150–450)
PO2 BLDA: 103 MM HG (ref 85–95)
PO2 BLDA: 109 MM HG (ref 85–95)
PO2 BLDA: 92 MM HG (ref 85–95)
POTASSIUM BLDA-SCNC: 3.6 MMOL/L (ref 3.5–5.3)
POTASSIUM BLDA-SCNC: 4.1 MMOL/L (ref 3.5–5.3)
POTASSIUM BLDA-SCNC: 4.3 MMOL/L (ref 3.5–5.3)
POTASSIUM SERPL-SCNC: 4.4 MMOL/L (ref 3.5–5.3)
RBC # BLD AUTO: 2.98 X10*6/UL (ref 4.5–5.9)
RH FACTOR (ANTIGEN D): NORMAL
SAO2 % BLDA: 100 % (ref 94–100)
SAO2 % BLDA: 98 % (ref 94–100)
SAO2 % BLDA: 99 % (ref 94–100)
SODIUM BLDA-SCNC: 135 MMOL/L (ref 136–145)
SODIUM BLDA-SCNC: 136 MMOL/L (ref 136–145)
SODIUM BLDA-SCNC: 136 MMOL/L (ref 136–145)
SODIUM SERPL-SCNC: 140 MMOL/L (ref 136–145)
WBC # BLD AUTO: 16.9 X10*3/UL (ref 4.4–11.3)

## 2025-07-17 PROCEDURE — 07T20ZZ RESECTION OF LEFT NECK LYMPHATIC, OPEN APPROACH: ICD-10-PCS | Performed by: OTOLARYNGOLOGY

## 2025-07-17 PROCEDURE — 88307 TISSUE EXAM BY PATHOLOGIST: CPT | Performed by: STUDENT IN AN ORGANIZED HEALTH CARE EDUCATION/TRAINING PROGRAM

## 2025-07-17 PROCEDURE — 2500000001 HC RX 250 WO HCPCS SELF ADMINISTERED DRUGS (ALT 637 FOR MEDICARE OP): Performed by: OTOLARYNGOLOGY

## 2025-07-17 PROCEDURE — 2500000005 HC RX 250 GENERAL PHARMACY W/O HCPCS: Performed by: STUDENT IN AN ORGANIZED HEALTH CARE EDUCATION/TRAINING PROGRAM

## 2025-07-17 PROCEDURE — 3700000001 HC GENERAL ANESTHESIA TIME - INITIAL BASE CHARGE: Performed by: OTOLARYNGOLOGY

## 2025-07-17 PROCEDURE — 0B110F4 BYPASS TRACHEA TO CUTANEOUS WITH TRACHEOSTOMY DEVICE, OPEN APPROACH: ICD-10-PCS | Performed by: OTOLARYNGOLOGY

## 2025-07-17 PROCEDURE — 88309 TISSUE EXAM BY PATHOLOGIST: CPT | Performed by: STUDENT IN AN ORGANIZED HEALTH CARE EDUCATION/TRAINING PROGRAM

## 2025-07-17 PROCEDURE — 83735 ASSAY OF MAGNESIUM: CPT | Performed by: STUDENT IN AN ORGANIZED HEALTH CARE EDUCATION/TRAINING PROGRAM

## 2025-07-17 PROCEDURE — 0DH63UZ INSERTION OF FEEDING DEVICE INTO STOMACH, PERCUTANEOUS APPROACH: ICD-10-PCS | Performed by: OTOLARYNGOLOGY

## 2025-07-17 PROCEDURE — 0CU407Z SUPPLEMENT BUCCAL MUCOSA WITH AUTOLOGOUS TISSUE SUBSTITUTE, OPEN APPROACH: ICD-10-PCS | Performed by: OTOLARYNGOLOGY

## 2025-07-17 PROCEDURE — 2500000001 HC RX 250 WO HCPCS SELF ADMINISTERED DRUGS (ALT 637 FOR MEDICARE OP)

## 2025-07-17 PROCEDURE — 93287 PERI-PX DEVICE EVAL & PRGR: CPT | Performed by: INTERNAL MEDICINE

## 2025-07-17 PROCEDURE — 0CB60ZZ EXCISION OF LOWER GINGIVA, OPEN APPROACH: ICD-10-PCS | Performed by: OTOLARYNGOLOGY

## 2025-07-17 PROCEDURE — 88305 TISSUE EXAM BY PATHOLOGIST: CPT | Performed by: STUDENT IN AN ORGANIZED HEALTH CARE EDUCATION/TRAINING PROGRAM

## 2025-07-17 PROCEDURE — 0JBH0ZZ EXCISION OF LEFT LOWER ARM SUBCUTANEOUS TISSUE AND FASCIA, OPEN APPROACH: ICD-10-PCS | Performed by: OTOLARYNGOLOGY

## 2025-07-17 PROCEDURE — 88311 DECALCIFY TISSUE: CPT | Performed by: STUDENT IN AN ORGANIZED HEALTH CARE EDUCATION/TRAINING PROGRAM

## 2025-07-17 PROCEDURE — 15757 FREE SKIN FLAP MICROVASC: CPT | Performed by: STUDENT IN AN ORGANIZED HEALTH CARE EDUCATION/TRAINING PROGRAM

## 2025-07-17 PROCEDURE — 3600000004 HC OR TIME - INITIAL BASE CHARGE - PROCEDURE LEVEL FOUR: Performed by: OTOLARYNGOLOGY

## 2025-07-17 PROCEDURE — A4312 CATH W/O BAG 2-WAY SILICONE: HCPCS | Performed by: OTOLARYNGOLOGY

## 2025-07-17 PROCEDURE — 2500000004 HC RX 250 GENERAL PHARMACY W/ HCPCS (ALT 636 FOR OP/ED): Performed by: STUDENT IN AN ORGANIZED HEALTH CARE EDUCATION/TRAINING PROGRAM

## 2025-07-17 PROCEDURE — 2780000003 HC OR 278 NO HCPCS: Performed by: OTOLARYNGOLOGY

## 2025-07-17 PROCEDURE — 3E0G76Z INTRODUCTION OF NUTRITIONAL SUBSTANCE INTO UPPER GI, VIA NATURAL OR ARTIFICIAL OPENING: ICD-10-PCS | Performed by: OTOLARYNGOLOGY

## 2025-07-17 PROCEDURE — 15120 SPLT AGRFT F/S/N/H/F/G/M 1ST: CPT | Performed by: STUDENT IN AN ORGANIZED HEALTH CARE EDUCATION/TRAINING PROGRAM

## 2025-07-17 PROCEDURE — 88305 TISSUE EXAM BY PATHOLOGIST: CPT | Mod: TC,SUR | Performed by: OTOLARYNGOLOGY

## 2025-07-17 PROCEDURE — 3700000002 HC GENERAL ANESTHESIA TIME - EACH INCREMENTAL 1 MINUTE: Performed by: OTOLARYNGOLOGY

## 2025-07-17 PROCEDURE — 1200000003 HC ONCOLOGY  ROOM WITH TELEMETRY DAILY

## 2025-07-17 PROCEDURE — 41874 ALVEOLOPLASTY EACH QUADRANT: CPT | Performed by: STUDENT IN AN ORGANIZED HEALTH CARE EDUCATION/TRAINING PROGRAM

## 2025-07-17 PROCEDURE — 88331 PATH CONSLTJ SURG 1 BLK 1SPC: CPT | Performed by: STUDENT IN AN ORGANIZED HEALTH CARE EDUCATION/TRAINING PROGRAM

## 2025-07-17 PROCEDURE — 41874 ALVEOLOPLASTY EACH QUADRANT: CPT | Performed by: OTOLARYNGOLOGY

## 2025-07-17 PROCEDURE — 2720000007 HC OR 272 NO HCPCS: Performed by: OTOLARYNGOLOGY

## 2025-07-17 PROCEDURE — 31622 DX BRONCHOSCOPE/WASH: CPT | Performed by: OTOLARYNGOLOGY

## 2025-07-17 PROCEDURE — 82947 ASSAY GLUCOSE BLOOD QUANT: CPT

## 2025-07-17 PROCEDURE — 85027 COMPLETE CBC AUTOMATED: CPT | Performed by: STUDENT IN AN ORGANIZED HEALTH CARE EDUCATION/TRAINING PROGRAM

## 2025-07-17 PROCEDURE — 7100000001 HC RECOVERY ROOM TIME - INITIAL BASE CHARGE: Performed by: OTOLARYNGOLOGY

## 2025-07-17 PROCEDURE — P9045 ALBUMIN (HUMAN), 5%, 250 ML: HCPCS | Mod: JZ

## 2025-07-17 PROCEDURE — 0NBV0ZZ EXCISION OF LEFT MANDIBLE, OPEN APPROACH: ICD-10-PCS | Performed by: OTOLARYNGOLOGY

## 2025-07-17 PROCEDURE — 15757 FREE SKIN FLAP MICROVASC: CPT | Performed by: OTOLARYNGOLOGY

## 2025-07-17 PROCEDURE — 93287 PERI-PX DEVICE EVAL & PRGR: CPT

## 2025-07-17 PROCEDURE — 40842 RECONSTRUCTION OF MOUTH: CPT | Performed by: STUDENT IN AN ORGANIZED HEALTH CARE EDUCATION/TRAINING PROGRAM

## 2025-07-17 PROCEDURE — 0HBJXZZ EXCISION OF LEFT UPPER LEG SKIN, EXTERNAL APPROACH: ICD-10-PCS | Performed by: OTOLARYNGOLOGY

## 2025-07-17 PROCEDURE — 2500000004 HC RX 250 GENERAL PHARMACY W/ HCPCS (ALT 636 FOR OP/ED): Performed by: OTOLARYNGOLOGY

## 2025-07-17 PROCEDURE — 0CJS8ZZ INSPECTION OF LARYNX, VIA NATURAL OR ARTIFICIAL OPENING ENDOSCOPIC: ICD-10-PCS | Performed by: OTOLARYNGOLOGY

## 2025-07-17 PROCEDURE — 2500000004 HC RX 250 GENERAL PHARMACY W/ HCPCS (ALT 636 FOR OP/ED): Mod: JZ

## 2025-07-17 PROCEDURE — 2500000001 HC RX 250 WO HCPCS SELF ADMINISTERED DRUGS (ALT 637 FOR MEDICARE OP): Performed by: STUDENT IN AN ORGANIZED HEALTH CARE EDUCATION/TRAINING PROGRAM

## 2025-07-17 PROCEDURE — 2500000004 HC RX 250 GENERAL PHARMACY W/ HCPCS (ALT 636 FOR OP/ED)

## 2025-07-17 PROCEDURE — 3600000009 HC OR TIME - EACH INCREMENTAL 1 MINUTE - PROCEDURE LEVEL FOUR: Performed by: OTOLARYNGOLOGY

## 2025-07-17 PROCEDURE — 2500000005 HC RX 250 GENERAL PHARMACY W/O HCPCS: Performed by: OTOLARYNGOLOGY

## 2025-07-17 PROCEDURE — 99221 1ST HOSP IP/OBS SF/LOW 40: CPT | Performed by: STUDENT IN AN ORGANIZED HEALTH CARE EDUCATION/TRAINING PROGRAM

## 2025-07-17 PROCEDURE — 43246 EGD PLACE GASTROSTOMY TUBE: CPT | Performed by: OTOLARYNGOLOGY

## 2025-07-17 PROCEDURE — 35701 EXPL N/FLWD SURG NECK ART: CPT | Performed by: OTOLARYNGOLOGY

## 2025-07-17 PROCEDURE — 84132 ASSAY OF SERUM POTASSIUM: CPT

## 2025-07-17 PROCEDURE — 7100000002 HC RECOVERY ROOM TIME - EACH INCREMENTAL 1 MINUTE: Performed by: OTOLARYNGOLOGY

## 2025-07-17 PROCEDURE — 36415 COLL VENOUS BLD VENIPUNCTURE: CPT | Performed by: STUDENT IN AN ORGANIZED HEALTH CARE EDUCATION/TRAINING PROGRAM

## 2025-07-17 PROCEDURE — 0HREX74 REPLACEMENT OF LEFT LOWER ARM SKIN WITH AUTOLOGOUS TISSUE SUBSTITUTE, PARTIAL THICKNESS, EXTERNAL APPROACH: ICD-10-PCS | Performed by: OTOLARYNGOLOGY

## 2025-07-17 PROCEDURE — 41150 TONGUE MOUTH JAW SURGERY: CPT | Performed by: OTOLARYNGOLOGY

## 2025-07-17 PROCEDURE — 0BJ08ZZ INSPECTION OF TRACHEOBRONCHIAL TREE, VIA NATURAL OR ARTIFICIAL OPENING ENDOSCOPIC: ICD-10-PCS | Performed by: OTOLARYNGOLOGY

## 2025-07-17 PROCEDURE — 88332 PATH CONSLTJ SURG EA ADD BLK: CPT | Performed by: STUDENT IN AN ORGANIZED HEALTH CARE EDUCATION/TRAINING PROGRAM

## 2025-07-17 PROCEDURE — 84132 ASSAY OF SERUM POTASSIUM: CPT | Performed by: STUDENT IN AN ORGANIZED HEALTH CARE EDUCATION/TRAINING PROGRAM

## 2025-07-17 DEVICE — IMPLANTABLE DEVICE: Type: IMPLANTABLE DEVICE | Site: NECK | Status: FUNCTIONAL

## 2025-07-17 RX ORDER — WATER 1 ML/ML
INJECTION IRRIGATION AS NEEDED
Status: DISCONTINUED | OUTPATIENT
Start: 2025-07-17 | End: 2025-07-17 | Stop reason: HOSPADM

## 2025-07-17 RX ORDER — CHOLECALCIFEROL (VITAMIN D3) 25 MCG
50 TABLET ORAL DAILY
Status: DISCONTINUED | OUTPATIENT
Start: 2025-07-17 | End: 2025-07-22 | Stop reason: HOSPADM

## 2025-07-17 RX ORDER — FENTANYL CITRATE 50 UG/ML
INJECTION, SOLUTION INTRAMUSCULAR; INTRAVENOUS AS NEEDED
Status: DISCONTINUED | OUTPATIENT
Start: 2025-07-17 | End: 2025-07-17

## 2025-07-17 RX ORDER — DEXTROSE 50 % IN WATER (D50W) INTRAVENOUS SYRINGE
25
Status: DISCONTINUED | OUTPATIENT
Start: 2025-07-17 | End: 2025-07-22 | Stop reason: HOSPADM

## 2025-07-17 RX ORDER — MEPERIDINE HYDROCHLORIDE 25 MG/ML
INJECTION INTRAMUSCULAR; INTRAVENOUS; SUBCUTANEOUS
Status: CANCELLED | OUTPATIENT
Start: 2025-07-17

## 2025-07-17 RX ORDER — ROCURONIUM BROMIDE 10 MG/ML
INJECTION, SOLUTION INTRAVENOUS AS NEEDED
Status: DISCONTINUED | OUTPATIENT
Start: 2025-07-17 | End: 2025-07-17

## 2025-07-17 RX ORDER — LIDOCAINE HYDROCHLORIDE 20 MG/ML
INJECTION, SOLUTION INFILTRATION; PERINEURAL AS NEEDED
Status: DISCONTINUED | OUTPATIENT
Start: 2025-07-17 | End: 2025-07-17 | Stop reason: HOSPADM

## 2025-07-17 RX ORDER — METOPROLOL TARTRATE 25 MG/1
6.25 TABLET, FILM COATED ORAL 2 TIMES DAILY
Status: DISCONTINUED | OUTPATIENT
Start: 2025-07-17 | End: 2025-07-21

## 2025-07-17 RX ORDER — NALOXONE HYDROCHLORIDE 0.4 MG/ML
0.2 INJECTION, SOLUTION INTRAMUSCULAR; INTRAVENOUS; SUBCUTANEOUS EVERY 5 MIN PRN
Status: DISCONTINUED | OUTPATIENT
Start: 2025-07-17 | End: 2025-07-22 | Stop reason: HOSPADM

## 2025-07-17 RX ORDER — INSULIN LISPRO 100 [IU]/ML
0-5 INJECTION, SOLUTION INTRAVENOUS; SUBCUTANEOUS EVERY 4 HOURS
Status: DISCONTINUED | OUTPATIENT
Start: 2025-07-17 | End: 2025-07-18

## 2025-07-17 RX ORDER — CHLORHEXIDINE GLUCONATE ORAL RINSE 1.2 MG/ML
15 SOLUTION DENTAL DAILY
Status: DISCONTINUED | OUTPATIENT
Start: 2025-07-18 | End: 2025-07-22 | Stop reason: HOSPADM

## 2025-07-17 RX ORDER — ATORVASTATIN CALCIUM 40 MG/1
40 TABLET, FILM COATED ORAL NIGHTLY
Status: DISCONTINUED | OUTPATIENT
Start: 2025-07-17 | End: 2025-07-22 | Stop reason: HOSPADM

## 2025-07-17 RX ORDER — ASPIRIN 325 MG
325 TABLET ORAL DAILY
Status: DISCONTINUED | OUTPATIENT
Start: 2025-07-17 | End: 2025-07-22 | Stop reason: HOSPADM

## 2025-07-17 RX ORDER — LIDOCAINE HYDROCHLORIDE AND EPINEPHRINE 10; 10 UG/ML; MG/ML
INJECTION, SOLUTION INFILTRATION; PERINEURAL AS NEEDED
Status: DISCONTINUED | OUTPATIENT
Start: 2025-07-17 | End: 2025-07-17 | Stop reason: HOSPADM

## 2025-07-17 RX ORDER — LIDOCAINE HCL/PF 100 MG/5ML
SYRINGE (ML) INTRAVENOUS AS NEEDED
Status: DISCONTINUED | OUTPATIENT
Start: 2025-07-17 | End: 2025-07-17

## 2025-07-17 RX ORDER — ESMOLOL HYDROCHLORIDE 10 MG/ML
INJECTION INTRAVENOUS AS NEEDED
Status: DISCONTINUED | OUTPATIENT
Start: 2025-07-17 | End: 2025-07-17

## 2025-07-17 RX ORDER — SPIRONOLACTONE 25 MG/1
12.5 TABLET ORAL DAILY
Status: DISCONTINUED | OUTPATIENT
Start: 2025-07-17 | End: 2025-07-19

## 2025-07-17 RX ORDER — ACETAMINOPHEN 325 MG/1
975 TABLET ORAL EVERY 8 HOURS SCHEDULED
Status: DISCONTINUED | OUTPATIENT
Start: 2025-07-17 | End: 2025-07-22 | Stop reason: HOSPADM

## 2025-07-17 RX ORDER — ALBUMIN HUMAN 50 G/1000ML
SOLUTION INTRAVENOUS AS NEEDED
Status: DISCONTINUED | OUTPATIENT
Start: 2025-07-17 | End: 2025-07-17

## 2025-07-17 RX ORDER — GABAPENTIN 300 MG/1
CAPSULE ORAL AS NEEDED
Status: DISCONTINUED | OUTPATIENT
Start: 2025-07-17 | End: 2025-07-17

## 2025-07-17 RX ORDER — ONDANSETRON 4 MG/1
4 TABLET, FILM COATED ORAL EVERY 8 HOURS PRN
Status: DISCONTINUED | OUTPATIENT
Start: 2025-07-17 | End: 2025-07-22 | Stop reason: HOSPADM

## 2025-07-17 RX ORDER — CHLORHEXIDINE GLUCONATE ORAL RINSE 1.2 MG/ML
15 SOLUTION DENTAL
Status: DISCONTINUED | OUTPATIENT
Start: 2025-07-18 | End: 2025-07-22 | Stop reason: HOSPADM

## 2025-07-17 RX ORDER — PAPAVERINE HYDROCHLORIDE 30 MG/ML
INJECTION INTRAMUSCULAR; INTRAVENOUS AS NEEDED
Status: DISCONTINUED | OUTPATIENT
Start: 2025-07-17 | End: 2025-07-17 | Stop reason: HOSPADM

## 2025-07-17 RX ORDER — ONDANSETRON HYDROCHLORIDE 2 MG/ML
4 INJECTION, SOLUTION INTRAVENOUS ONCE AS NEEDED
Status: DISCONTINUED | OUTPATIENT
Start: 2025-07-17 | End: 2025-07-17 | Stop reason: HOSPADM

## 2025-07-17 RX ORDER — AMPICILLIN AND SULBACTAM 2; 1 G/1; G/1
INJECTION, POWDER, FOR SOLUTION INTRAMUSCULAR; INTRAVENOUS AS NEEDED
Status: DISCONTINUED | OUTPATIENT
Start: 2025-07-17 | End: 2025-07-17

## 2025-07-17 RX ORDER — NAPROXEN SODIUM 220 MG/1
81 TABLET, FILM COATED ORAL DAILY
Status: CANCELLED | OUTPATIENT
Start: 2025-07-17

## 2025-07-17 RX ORDER — OXYCODONE HYDROCHLORIDE 5 MG/1
5 TABLET ORAL EVERY 4 HOURS PRN
Status: DISCONTINUED | OUTPATIENT
Start: 2025-07-17 | End: 2025-07-17 | Stop reason: HOSPADM

## 2025-07-17 RX ORDER — ALBUTEROL SULFATE 90 UG/1
2 INHALANT RESPIRATORY (INHALATION) EVERY 6 HOURS PRN
Status: DISCONTINUED | OUTPATIENT
Start: 2025-07-17 | End: 2025-07-22 | Stop reason: HOSPADM

## 2025-07-17 RX ORDER — ALBUMIN HUMAN 50 G/1000ML
25 SOLUTION INTRAVENOUS ONCE
Status: CANCELLED | OUTPATIENT
Start: 2025-07-17 | End: 2025-07-17

## 2025-07-17 RX ORDER — ONDANSETRON HYDROCHLORIDE 2 MG/ML
INJECTION, SOLUTION INTRAVENOUS AS NEEDED
Status: DISCONTINUED | OUTPATIENT
Start: 2025-07-17 | End: 2025-07-17

## 2025-07-17 RX ORDER — BACITRACIN ZINC 500 UNIT/G
OINTMENT IN PACKET (EA) TOPICAL 3 TIMES DAILY
Status: DISPENSED | OUTPATIENT
Start: 2025-07-17 | End: 2025-07-19

## 2025-07-17 RX ORDER — HYDROGEN PEROXIDE 3 %
1 SOLUTION, NON-ORAL MISCELLANEOUS 3 TIMES DAILY
Status: DISCONTINUED | OUTPATIENT
Start: 2025-07-17 | End: 2025-07-22 | Stop reason: HOSPADM

## 2025-07-17 RX ORDER — LABETALOL HYDROCHLORIDE 5 MG/ML
INJECTION, SOLUTION INTRAVENOUS AS NEEDED
Status: DISCONTINUED | OUTPATIENT
Start: 2025-07-17 | End: 2025-07-17

## 2025-07-17 RX ORDER — BACITRACIN 500 [USP'U]/G
OINTMENT TOPICAL AS NEEDED
Status: DISCONTINUED | OUTPATIENT
Start: 2025-07-17 | End: 2025-07-17 | Stop reason: HOSPADM

## 2025-07-17 RX ORDER — HYDROMORPHONE HCL/0.9% NACL/PF 15 MG/30ML
PATIENT CONTROLLED ANALGESIA SYRINGE INTRAVENOUS CONTINUOUS
Status: DISCONTINUED | OUTPATIENT
Start: 2025-07-17 | End: 2025-07-18

## 2025-07-17 RX ORDER — SODIUM CHLORIDE, SODIUM LACTATE, POTASSIUM CHLORIDE, CALCIUM CHLORIDE 600; 310; 30; 20 MG/100ML; MG/100ML; MG/100ML; MG/100ML
50 INJECTION, SOLUTION INTRAVENOUS CONTINUOUS
Status: ACTIVE | OUTPATIENT
Start: 2025-07-17 | End: 2025-07-18

## 2025-07-17 RX ORDER — MEPERIDINE HYDROCHLORIDE 25 MG/ML
12.5 INJECTION INTRAMUSCULAR; INTRAVENOUS; SUBCUTANEOUS ONCE AS NEEDED
Status: CANCELLED | OUTPATIENT
Start: 2025-07-17

## 2025-07-17 RX ORDER — HYDROMORPHONE HYDROCHLORIDE 1 MG/ML
INJECTION, SOLUTION INTRAMUSCULAR; INTRAVENOUS; SUBCUTANEOUS AS NEEDED
Status: DISCONTINUED | OUTPATIENT
Start: 2025-07-17 | End: 2025-07-17

## 2025-07-17 RX ORDER — DEXTROSE 50 % IN WATER (D50W) INTRAVENOUS SYRINGE
12.5
Status: DISCONTINUED | OUTPATIENT
Start: 2025-07-17 | End: 2025-07-22 | Stop reason: HOSPADM

## 2025-07-17 RX ORDER — HYDROMORPHONE HYDROCHLORIDE 0.2 MG/ML
0.2 INJECTION INTRAMUSCULAR; INTRAVENOUS; SUBCUTANEOUS EVERY 5 MIN PRN
Status: DISCONTINUED | OUTPATIENT
Start: 2025-07-17 | End: 2025-07-17 | Stop reason: HOSPADM

## 2025-07-17 RX ORDER — OXYCODONE HYDROCHLORIDE 5 MG/1
10 TABLET ORAL EVERY 4 HOURS PRN
Status: DISCONTINUED | OUTPATIENT
Start: 2025-07-17 | End: 2025-07-17 | Stop reason: HOSPADM

## 2025-07-17 RX ORDER — PROPOFOL 10 MG/ML
INJECTION, EMULSION INTRAVENOUS AS NEEDED
Status: DISCONTINUED | OUTPATIENT
Start: 2025-07-17 | End: 2025-07-17

## 2025-07-17 RX ORDER — PANTOPRAZOLE SODIUM 40 MG/10ML
40 INJECTION, POWDER, LYOPHILIZED, FOR SOLUTION INTRAVENOUS DAILY
Status: DISCONTINUED | OUTPATIENT
Start: 2025-07-17 | End: 2025-07-22 | Stop reason: HOSPADM

## 2025-07-17 RX ORDER — ACETAMINOPHEN 160 MG/5ML
1000 SOLUTION ORAL EVERY 8 HOURS SCHEDULED
Status: DISCONTINUED | OUTPATIENT
Start: 2025-07-17 | End: 2025-07-22 | Stop reason: HOSPADM

## 2025-07-17 RX ORDER — NALOXONE HYDROCHLORIDE 0.4 MG/ML
0.2 INJECTION, SOLUTION INTRAMUSCULAR; INTRAVENOUS; SUBCUTANEOUS AS NEEDED
Status: DISCONTINUED | OUTPATIENT
Start: 2025-07-17 | End: 2025-07-22 | Stop reason: HOSPADM

## 2025-07-17 RX ORDER — POLYETHYLENE GLYCOL 3350 17 G/17G
17 POWDER, FOR SOLUTION ORAL DAILY
Status: DISCONTINUED | OUTPATIENT
Start: 2025-07-17 | End: 2025-07-20

## 2025-07-17 RX ORDER — ONDANSETRON HYDROCHLORIDE 2 MG/ML
4 INJECTION, SOLUTION INTRAVENOUS EVERY 8 HOURS PRN
Status: DISCONTINUED | OUTPATIENT
Start: 2025-07-17 | End: 2025-07-22 | Stop reason: HOSPADM

## 2025-07-17 RX ORDER — REMIFENTANIL HYDROCHLORIDE 1 MG/ML
INJECTION, POWDER, LYOPHILIZED, FOR SOLUTION INTRAVENOUS AS NEEDED
Status: DISCONTINUED | OUTPATIENT
Start: 2025-07-17 | End: 2025-07-17

## 2025-07-17 RX ORDER — ACETAMINOPHEN 325 MG/1
TABLET ORAL AS NEEDED
Status: DISCONTINUED | OUTPATIENT
Start: 2025-07-17 | End: 2025-07-17

## 2025-07-17 RX ORDER — ASPIRIN 300 MG/1
SUPPOSITORY RECTAL AS NEEDED
Status: DISCONTINUED | OUTPATIENT
Start: 2025-07-17 | End: 2025-07-17 | Stop reason: HOSPADM

## 2025-07-17 RX ORDER — SODIUM CHLORIDE 0.9 G/100ML
INJECTION, SOLUTION IRRIGATION AS NEEDED
Status: DISCONTINUED | OUTPATIENT
Start: 2025-07-17 | End: 2025-07-17 | Stop reason: HOSPADM

## 2025-07-17 RX ORDER — ENOXAPARIN SODIUM 100 MG/ML
40 INJECTION SUBCUTANEOUS EVERY 24 HOURS
Status: DISCONTINUED | OUTPATIENT
Start: 2025-07-18 | End: 2025-07-22 | Stop reason: HOSPADM

## 2025-07-17 RX ORDER — PETROLATUM 420 MG/G
1 OINTMENT TOPICAL 3 TIMES DAILY
Status: DISCONTINUED | OUTPATIENT
Start: 2025-07-19 | End: 2025-07-22 | Stop reason: HOSPADM

## 2025-07-17 RX ORDER — SODIUM CHLORIDE, SODIUM LACTATE, POTASSIUM CHLORIDE, CALCIUM CHLORIDE 600; 310; 30; 20 MG/100ML; MG/100ML; MG/100ML; MG/100ML
100 INJECTION, SOLUTION INTRAVENOUS CONTINUOUS
Status: CANCELLED | OUTPATIENT
Start: 2025-07-17 | End: 2025-07-18

## 2025-07-17 RX ORDER — PHENYLEPHRINE HYDROCHLORIDE 10 MG/ML
INJECTION INTRAVENOUS AS NEEDED
Status: DISCONTINUED | OUTPATIENT
Start: 2025-07-17 | End: 2025-07-17

## 2025-07-17 RX ORDER — PHENYLEPHRINE 10 MG/250 ML(40 MCG/ML)IN 0.9 % SOD.CHLORIDE INTRAVENOUS
CONTINUOUS PRN
Status: DISCONTINUED | OUTPATIENT
Start: 2025-07-17 | End: 2025-07-17

## 2025-07-17 RX ORDER — LABETALOL HYDROCHLORIDE 5 MG/ML
5 INJECTION, SOLUTION INTRAVENOUS ONCE AS NEEDED
Status: CANCELLED | OUTPATIENT
Start: 2025-07-17

## 2025-07-17 RX ADMIN — PHENYLEPHRINE HYDROCHLORIDE 80 MCG: 10 INJECTION INTRAVENOUS at 09:56

## 2025-07-17 RX ADMIN — PHENYLEPHRINE HYDROCHLORIDE 80 MCG: 10 INJECTION INTRAVENOUS at 15:46

## 2025-07-17 RX ADMIN — HYDROMORPHONE HYDROCHLORIDE 0.2 MG: 1 INJECTION, SOLUTION INTRAMUSCULAR; INTRAVENOUS; SUBCUTANEOUS at 16:33

## 2025-07-17 RX ADMIN — AMPICILLIN SODIUM AND SULBACTAM SODIUM 3 G: 2; 1 INJECTION, POWDER, FOR SOLUTION INTRAMUSCULAR; INTRAVENOUS at 20:15

## 2025-07-17 RX ADMIN — ALBUMIN HUMAN 250 ML: 0.05 INJECTION, SOLUTION INTRAVENOUS at 11:31

## 2025-07-17 RX ADMIN — GABAPENTIN 600 MG: 300 CAPSULE ORAL at 07:06

## 2025-07-17 RX ADMIN — ALBUMIN HUMAN 250 ML: 0.05 INJECTION, SOLUTION INTRAVENOUS at 17:05

## 2025-07-17 RX ADMIN — ESMOLOL HYDROCHLORIDE 50 MG: 10 INJECTION, SOLUTION INTRAVENOUS at 16:55

## 2025-07-17 RX ADMIN — SODIUM CHLORIDE, SODIUM LACTATE, POTASSIUM CHLORIDE, AND CALCIUM CHLORIDE 50 ML/HR: .6; .31; .03; .02 INJECTION, SOLUTION INTRAVENOUS at 19:44

## 2025-07-17 RX ADMIN — PHENYLEPHRINE HYDROCHLORIDE 80 MCG: 10 INJECTION INTRAVENOUS at 11:25

## 2025-07-17 RX ADMIN — REMIFENTANIL HYDROCHLORIDE 20 MCG: 1 INJECTION, POWDER, LYOPHILIZED, FOR SOLUTION INTRAVENOUS at 12:18

## 2025-07-17 RX ADMIN — HYDROMORPHONE HYDROCHLORIDE 0.2 MG: 1 INJECTION, SOLUTION INTRAMUSCULAR; INTRAVENOUS; SUBCUTANEOUS at 16:22

## 2025-07-17 RX ADMIN — REMIFENTANIL HYDROCHLORIDE 20 MCG: 1 INJECTION, POWDER, LYOPHILIZED, FOR SOLUTION INTRAVENOUS at 16:33

## 2025-07-17 RX ADMIN — LABETALOL HYDROCHLORIDE 15 MG: 5 INJECTION, SOLUTION INTRAVENOUS at 17:02

## 2025-07-17 RX ADMIN — REMIFENTANIL HYDROCHLORIDE 0.03 MCG/KG/MIN: 1 INJECTION, POWDER, LYOPHILIZED, FOR SOLUTION INTRAVENOUS at 11:17

## 2025-07-17 RX ADMIN — ROCURONIUM BROMIDE 50 MG: 10 INJECTION, SOLUTION INTRAVENOUS at 07:56

## 2025-07-17 RX ADMIN — LIDOCAINE HYDROCHLORIDE 100 MG: 20 INJECTION, SOLUTION INTRAVENOUS at 07:56

## 2025-07-17 RX ADMIN — ALBUMIN HUMAN 250 ML: 0.05 INJECTION, SOLUTION INTRAVENOUS at 09:49

## 2025-07-17 RX ADMIN — REMIFENTANIL HYDROCHLORIDE 20 MCG: 1 INJECTION, POWDER, LYOPHILIZED, FOR SOLUTION INTRAVENOUS at 12:34

## 2025-07-17 RX ADMIN — REMIFENTANIL HYDROCHLORIDE 40 MCG: 1 INJECTION, POWDER, LYOPHILIZED, FOR SOLUTION INTRAVENOUS at 08:52

## 2025-07-17 RX ADMIN — FENTANYL CITRATE 50 MCG: 50 INJECTION, SOLUTION INTRAMUSCULAR; INTRAVENOUS at 07:56

## 2025-07-17 RX ADMIN — AMPICILLIN AND SULBACTAM 3 G: 2; 1 INJECTION, POWDER, FOR SOLUTION INTRAVENOUS at 12:09

## 2025-07-17 RX ADMIN — AMPICILLIN AND SULBACTAM 3 G: 2; 1 INJECTION, POWDER, FOR SOLUTION INTRAVENOUS at 08:01

## 2025-07-17 RX ADMIN — HYDROGEN PEROXIDE 1 APPLICATION: 30 SOLUTION TOPICAL at 23:06

## 2025-07-17 RX ADMIN — PHENYLEPHRINE HYDROCHLORIDE 120 MCG: 10 INJECTION INTRAVENOUS at 09:31

## 2025-07-17 RX ADMIN — PROPOFOL 100 MG: 10 INJECTION, EMULSION INTRAVENOUS at 07:56

## 2025-07-17 RX ADMIN — REMIFENTANIL HYDROCHLORIDE 40 MCG: 1 INJECTION, POWDER, LYOPHILIZED, FOR SOLUTION INTRAVENOUS at 11:17

## 2025-07-17 RX ADMIN — SODIUM CHLORIDE, SODIUM LACTATE, POTASSIUM CHLORIDE, AND CALCIUM CHLORIDE: 600; 310; 30; 20 INJECTION, SOLUTION INTRAVENOUS at 07:36

## 2025-07-17 RX ADMIN — REMIFENTANIL HYDROCHLORIDE 0.05 MCG/KG/MIN: 1 INJECTION, POWDER, LYOPHILIZED, FOR SOLUTION INTRAVENOUS at 08:19

## 2025-07-17 RX ADMIN — BACITRACIN 1 APPLICATION: 500 OINTMENT TOPICAL at 23:06

## 2025-07-17 RX ADMIN — ONDANSETRON 4 MG: 2 INJECTION, SOLUTION INTRAMUSCULAR; INTRAVENOUS at 16:22

## 2025-07-17 RX ADMIN — ACETAMINOPHEN 975 MG: 325 TABLET, FILM COATED ORAL at 07:06

## 2025-07-17 RX ADMIN — REMIFENTANIL HYDROCHLORIDE 20 MCG: 1 INJECTION, POWDER, LYOPHILIZED, FOR SOLUTION INTRAVENOUS at 12:30

## 2025-07-17 RX ADMIN — PHENYLEPHRINE HYDROCHLORIDE 400 MCG: 10 INJECTION INTRAVENOUS at 08:31

## 2025-07-17 RX ADMIN — PHENYLEPHRINE-NACL IV SOLUTION 10 MG/250ML-0.9% 0.2 MCG/KG/MIN: 10-0.9/25 SOLUTION at 08:47

## 2025-07-17 RX ADMIN — REMIFENTANIL HYDROCHLORIDE 20 MCG: 1 INJECTION, POWDER, LYOPHILIZED, FOR SOLUTION INTRAVENOUS at 13:02

## 2025-07-17 RX ADMIN — AMPICILLIN AND SULBACTAM 3 G: 2; 1 INJECTION, POWDER, FOR SOLUTION INTRAVENOUS at 15:59

## 2025-07-17 RX ADMIN — REMIFENTANIL HYDROCHLORIDE 20 MCG: 1 INJECTION, POWDER, LYOPHILIZED, FOR SOLUTION INTRAVENOUS at 14:44

## 2025-07-17 RX ADMIN — REMIFENTANIL HYDROCHLORIDE 20 MCG: 1 INJECTION, POWDER, LYOPHILIZED, FOR SOLUTION INTRAVENOUS at 12:17

## 2025-07-17 RX ADMIN — DEXAMETHASONE SODIUM PHOSPHATE 10 MG: 4 INJECTION INTRA-ARTICULAR; INTRALESIONAL; INTRAMUSCULAR; INTRAVENOUS; SOFT TISSUE at 08:07

## 2025-07-17 RX ADMIN — REMIFENTANIL HYDROCHLORIDE 40 MCG: 1 INJECTION, POWDER, LYOPHILIZED, FOR SOLUTION INTRAVENOUS at 08:49

## 2025-07-17 RX ADMIN — ESMOLOL HYDROCHLORIDE 50 MG: 10 INJECTION, SOLUTION INTRAVENOUS at 17:02

## 2025-07-17 RX ADMIN — PHENYLEPHRINE HYDROCHLORIDE 120 MCG: 10 INJECTION INTRAVENOUS at 10:07

## 2025-07-17 RX ADMIN — Medication: at 19:41

## 2025-07-17 SDOH — ECONOMIC STABILITY: FOOD INSECURITY: HOW HARD IS IT FOR YOU TO PAY FOR THE VERY BASICS LIKE FOOD, HOUSING, MEDICAL CARE, AND HEATING?: NOT HARD AT ALL

## 2025-07-17 SDOH — ECONOMIC STABILITY: TRANSPORTATION INSECURITY: IN THE PAST 12 MONTHS, HAS LACK OF TRANSPORTATION KEPT YOU FROM MEDICAL APPOINTMENTS OR FROM GETTING MEDICATIONS?: NO

## 2025-07-17 SDOH — ECONOMIC STABILITY: INCOME INSECURITY: IN THE PAST 12 MONTHS HAS THE ELECTRIC, GAS, OIL, OR WATER COMPANY THREATENED TO SHUT OFF SERVICES IN YOUR HOME?: NO

## 2025-07-17 SDOH — ECONOMIC STABILITY: HOUSING INSECURITY: IN THE LAST 12 MONTHS, WAS THERE A TIME WHEN YOU WERE NOT ABLE TO PAY THE MORTGAGE OR RENT ON TIME?: NO

## 2025-07-17 SDOH — SOCIAL STABILITY: SOCIAL INSECURITY: WITHIN THE LAST YEAR, HAVE YOU BEEN AFRAID OF YOUR PARTNER OR EX-PARTNER?: NO

## 2025-07-17 SDOH — ECONOMIC STABILITY: FOOD INSECURITY: WITHIN THE PAST 12 MONTHS, THE FOOD YOU BOUGHT JUST DIDN'T LAST AND YOU DIDN'T HAVE MONEY TO GET MORE.: NEVER TRUE

## 2025-07-17 SDOH — SOCIAL STABILITY: SOCIAL INSECURITY
WITHIN THE LAST YEAR, HAVE YOU BEEN RAPED OR FORCED TO HAVE ANY KIND OF SEXUAL ACTIVITY BY YOUR PARTNER OR EX-PARTNER?: NO

## 2025-07-17 SDOH — SOCIAL STABILITY: SOCIAL INSECURITY: WITHIN THE LAST YEAR, HAVE YOU BEEN HUMILIATED OR EMOTIONALLY ABUSED IN OTHER WAYS BY YOUR PARTNER OR EX-PARTNER?: NO

## 2025-07-17 SDOH — SOCIAL STABILITY: SOCIAL INSECURITY: WERE YOU ABLE TO COMPLETE ALL THE BEHAVIORAL HEALTH SCREENINGS?: YES

## 2025-07-17 SDOH — ECONOMIC STABILITY: HOUSING INSECURITY: IN THE PAST 12 MONTHS, HOW MANY TIMES HAVE YOU MOVED WHERE YOU WERE LIVING?: 0

## 2025-07-17 SDOH — ECONOMIC STABILITY: FOOD INSECURITY: WITHIN THE PAST 12 MONTHS, YOU WORRIED THAT YOUR FOOD WOULD RUN OUT BEFORE YOU GOT THE MONEY TO BUY MORE.: NEVER TRUE

## 2025-07-17 SDOH — SOCIAL STABILITY: SOCIAL INSECURITY: DOES ANYONE TRY TO KEEP YOU FROM HAVING/CONTACTING OTHER FRIENDS OR DOING THINGS OUTSIDE YOUR HOME?: NO

## 2025-07-17 SDOH — ECONOMIC STABILITY: HOUSING INSECURITY: AT ANY TIME IN THE PAST 12 MONTHS, WERE YOU HOMELESS OR LIVING IN A SHELTER (INCLUDING NOW)?: NO

## 2025-07-17 SDOH — SOCIAL STABILITY: SOCIAL INSECURITY
WITHIN THE LAST YEAR, HAVE YOU BEEN KICKED, HIT, SLAPPED, OR OTHERWISE PHYSICALLY HURT BY YOUR PARTNER OR EX-PARTNER?: NO

## 2025-07-17 SDOH — SOCIAL STABILITY: SOCIAL INSECURITY: HAS ANYONE EVER THREATENED TO HURT YOUR FAMILY OR YOUR PETS?: NO

## 2025-07-17 SDOH — SOCIAL STABILITY: SOCIAL INSECURITY: DO YOU FEEL ANYONE HAS EXPLOITED OR TAKEN ADVANTAGE OF YOU FINANCIALLY OR OF YOUR PERSONAL PROPERTY?: NO

## 2025-07-17 SDOH — SOCIAL STABILITY: SOCIAL INSECURITY: DO YOU FEEL UNSAFE GOING BACK TO THE PLACE WHERE YOU ARE LIVING?: NO

## 2025-07-17 SDOH — SOCIAL STABILITY: SOCIAL INSECURITY: ARE THERE ANY APPARENT SIGNS OF INJURIES/BEHAVIORS THAT COULD BE RELATED TO ABUSE/NEGLECT?: NO

## 2025-07-17 SDOH — SOCIAL STABILITY: SOCIAL INSECURITY: ARE YOU OR HAVE YOU BEEN THREATENED OR ABUSED PHYSICALLY, EMOTIONALLY, OR SEXUALLY BY ANYONE?: NO

## 2025-07-17 SDOH — SOCIAL STABILITY: SOCIAL INSECURITY: ABUSE: ADULT

## 2025-07-17 SDOH — SOCIAL STABILITY: SOCIAL INSECURITY: HAVE YOU HAD THOUGHTS OF HARMING ANYONE ELSE?: NO

## 2025-07-17 SDOH — HEALTH STABILITY: MENTAL HEALTH: CURRENT SMOKER: 1

## 2025-07-17 ASSESSMENT — COGNITIVE AND FUNCTIONAL STATUS - GENERAL
WALKING IN HOSPITAL ROOM: A LOT
HELP NEEDED FOR BATHING: A LITTLE
DAILY ACTIVITIY SCORE: 24
PATIENT BASELINE BEDBOUND: NO
MOVING TO AND FROM BED TO CHAIR: A LOT
STANDING UP FROM CHAIR USING ARMS: A LOT
PERSONAL GROOMING: A LITTLE
MOVING FROM LYING ON BACK TO SITTING ON SIDE OF FLAT BED WITH BEDRAILS: A LITTLE
DAILY ACTIVITIY SCORE: 18
MOBILITY SCORE: 24
MOBILITY SCORE: 14
DRESSING REGULAR UPPER BODY CLOTHING: A LITTLE
TOILETING: A LITTLE
EATING MEALS: A LITTLE
DRESSING REGULAR LOWER BODY CLOTHING: A LITTLE
CLIMB 3 TO 5 STEPS WITH RAILING: A LOT
TURNING FROM BACK TO SIDE WHILE IN FLAT BAD: A LITTLE

## 2025-07-17 ASSESSMENT — LIFESTYLE VARIABLES
HOW OFTEN DO YOU HAVE A DRINK CONTAINING ALCOHOL: NEVER
HOW MANY STANDARD DRINKS CONTAINING ALCOHOL DO YOU HAVE ON A TYPICAL DAY: PATIENT DOES NOT DRINK
SKIP TO QUESTIONS 9-10: 1
HOW OFTEN DO YOU HAVE 6 OR MORE DRINKS ON ONE OCCASION: NEVER
AUDIT-C TOTAL SCORE: 0
AUDIT-C TOTAL SCORE: 0

## 2025-07-17 ASSESSMENT — COLUMBIA-SUICIDE SEVERITY RATING SCALE - C-SSRS
6. HAVE YOU EVER DONE ANYTHING, STARTED TO DO ANYTHING, OR PREPARED TO DO ANYTHING TO END YOUR LIFE?: NO
1. IN THE PAST MONTH, HAVE YOU WISHED YOU WERE DEAD OR WISHED YOU COULD GO TO SLEEP AND NOT WAKE UP?: NO
2. HAVE YOU ACTUALLY HAD ANY THOUGHTS OF KILLING YOURSELF?: NO

## 2025-07-17 ASSESSMENT — ACTIVITIES OF DAILY LIVING (ADL)
PATIENT'S MEMORY ADEQUATE TO SAFELY COMPLETE DAILY ACTIVITIES?: YES
JUDGMENT_ADEQUATE_SAFELY_COMPLETE_DAILY_ACTIVITIES: YES
DRESSING YOURSELF: INDEPENDENT
HEARING - LEFT EAR: HEARING AID
HEARING - RIGHT EAR: HEARING AID
ASSISTIVE_DEVICE: HEARING AID - RIGHT;HEARING AID - LEFT
GROOMING: INDEPENDENT
LACK_OF_TRANSPORTATION: NO
WALKS IN HOME: INDEPENDENT
BATHING: INDEPENDENT
TOILETING: INDEPENDENT
ADEQUATE_TO_COMPLETE_ADL: YES
FEEDING YOURSELF: INDEPENDENT

## 2025-07-17 ASSESSMENT — PAIN SCALES - GENERAL
PAINLEVEL_OUTOF10: 0 - NO PAIN
PAIN_LEVEL: 0
PAINLEVEL_OUTOF10: 0 - NO PAIN
PAINLEVEL_OUTOF10: 8
PAINLEVEL_OUTOF10: 0 - NO PAIN
PAINLEVEL_OUTOF10: 0 - NO PAIN

## 2025-07-17 ASSESSMENT — PATIENT HEALTH QUESTIONNAIRE - PHQ9
SUM OF ALL RESPONSES TO PHQ9 QUESTIONS 1 & 2: 0
1. LITTLE INTEREST OR PLEASURE IN DOING THINGS: NOT AT ALL
2. FEELING DOWN, DEPRESSED OR HOPELESS: NOT AT ALL

## 2025-07-17 ASSESSMENT — PAIN - FUNCTIONAL ASSESSMENT
PAIN_FUNCTIONAL_ASSESSMENT: 0-10
PAIN_FUNCTIONAL_ASSESSMENT: 0-10

## 2025-07-17 NOTE — ANESTHESIA PREPROCEDURE EVALUATION
Patient: Shola Carbajal    Procedure Information       Anesthesia Start Date/Time: 25 0744    Procedures:       LARYNGOSCOPY      BRONCHOSCOPY      ESOPHAGOSCOPY      PEG TUBE INSERTION      TRACHEOSTOMY      MANDIBULECTOMY (Left) - left composite resection      NECK DISSECTION (Left)      SKIN GRAFT - right-vs-left thigh skin graft      MANDIBLE RECONSTRUCTION (Left) - mandibular reconstruction with plate and free tissue transfer      FREE FLAP; FIBULA VS. SCAPULA - right-vs-left fibular free flap -vs- scapula ()    Location: [facility] OR 03 / [facility]    Surgeons: Fili Chacon MD; Jamia Thomas MD            Relevant Problems   Cardiac   (+) AICD (automatic cardioverter/defibrillator) present   (+) CAD (coronary artery disease) (S/p stent x 4)   (+) CHF (congestive heart failure) (LVEF 30-35%)   (+) HTN (hypertension)   (+) MI (myocardial infarction) (Multi)   (+) Peripheral vascular disease   (+) Stented coronary artery      Pulmonary  Incidental Lung Nodule   (+) Chronic obstructive pulmonary disease (Multi)   (+) JAE (obstructive sleep apnea) (Not on CPAP)   (+) Pulmonary nodules   (-) History of home oxygen therapy   (-) Pulmonary embolus      GI   (+) GI bleed (2013 & 2019; enteroscopy 2019 jejunal AVM x 2)   (+) Gastroesophageal reflux disease      Liver   (+) Cancer of oral cavity (Multi)      Endocrine   (+) Hypothyroidism   (-) Diabetes mellitus type 1 (Multi)   (-) Diabetes mellitus, type 2 (Multi)      Hematology   (-) DVT (deep venous thrombosis) (Multi)      Musculoskeletal  Osteopenia       Clinical information reviewed:   Tobacco  Allergies  Meds   Med Hx  Surg Hx   Fam Hx  Soc Hx        NPO Detail:  NPO/Void Status  Date of Last Liquid: 25  Time of Last Liquid: 0400  Date of Last Solid: 25  Time of Last Solid: 1800         Physical Exam    Airway  Mallampati: II  TM distance: <3 FB  Neck ROM: full  Mouth openin finger widths     Cardiovascular    Rhythm: regular  Rate: normal     Dental     (+) lower dentures, upper dentures  Comments: Upper dentures; partial lower dentures      Pulmonary (+) wheezes     Abdominal Abdomen: soft  Bowel sounds: normal           Anesthesia Plan    History of general anesthesia?: yes  History of complications of general anesthesia?: no    ASA 4     general     The patient is a current smoker.  Patient was previously instructed to abstain from smoking on day of procedure.  Patient did not smoke on day of procedure.    intravenous induction   Postoperative pain plan includes opioids.  Trial extubation is planned.  Anesthetic plan and risks discussed with patient and spouse.  Use of blood products discussed with spouse and patient who consented to blood products.    Plan discussed with attending.

## 2025-07-17 NOTE — ANESTHESIA PROCEDURE NOTES
Airway  Date/Time: 7/17/2025 7:58 AM  Reason: elective    Airway not difficult    Staffing  Performed: resident   Authorized by: Darian Green DO    Performed by: Juan Arreguin MD  Patient location during procedure: OR    Patient Condition  Indications for airway management: anesthesia  Patient position: sniffing  MILS not maintained throughout  Planned trial extubation  Sedation level: deep     Final Airway Details   Preoxygenated: yes  Final airway type: endotracheal airway  Successful airway: ETT  Cuffed: yes   Successful intubation technique: video laryngoscopy  Adjuncts used in placement: intubating stylet  Endotracheal tube insertion site: oral  Blade size: #3  ETT size (mm): 5.5  Cormack-Lehane Classification: grade IIa - partial view of glottis  Placement verified by: chest auscultation and capnometry   Inital cuff pressure (cm H2O): 10  Measured from: lips  ETT to lips (cm): 21  Number of attempts at approach: 1  Number of other approaches attempted: 0

## 2025-07-17 NOTE — ANESTHESIA PROCEDURE NOTES
Peripheral IV  Date/Time: 7/17/2025 7:55 AM  Inserted by: Darian Green DO    Placement  Needle size: 16 G  Laterality: right  Location: hand  Local anesthetic: none  Site prep: chlorhexidine  Technique: anatomical landmarks  Attempts: 1

## 2025-07-17 NOTE — BRIEF OP NOTE
Date: 2025  OR Location: [facility] OR    Name: Shola Carbajal, : 1945, Age: 80 y.o., MRN: [id number], Sex: male    Diagnosis  Pre-op Diagnosis      * Cancer of oral cavity (Multi) [C06.9] Post-op Diagnosis     * Cancer of oral cavity (Multi) [C06.9]     Procedures  LARYNGOSCOPY  81980 - NE LARYNGOSCOPY W/WO TRACHEOSCOPY DX EXCEPT     BRONCHOSCOPY  71303 - NE BRNCHSC INCL FLUOR GDNCE DX W/CELL WASHG SPX    ESOPHAGOSCOPY  33562 - NE ESOPHAGOSCOPY FLEXIBLE TRANSORAL DIAGNOSTIC    PEG TUBE INSERTION  55183 - NE EGD PERCUTANEOUS PLACEMENT GASTROSTOMY TUBE    TRACHEOSTOMY  67443 - NE TRACHEOSTOMY PLANNED SEPARATE PROCEDURE    MANDIBULECTOMY  48701 - NE GLSSC COMPOSIT W/RESCJ FLOOR & MANDIBULAR RESCJ    LEFT COMPOSITE RESECTION, LEFT NECK EXPLORATION FOR VESSELS  53597 - NE CERVICAL LYMPHADEC MODIFIED RADICAL NECK DSJ    SKIN GRAFT  43845 - NE SPLT AGRFT F/S/N/H/F/G/M/DGT 1ST 100 SQCM/</1%    MANDIBLE RECONSTRUCTION  21881 - NE RCNSTJ MNDBL/MAXL SUBPRIOSTEAL IMPLANT PARTIAL    FREE FLAP; FIBULA VS. SCAPULA  45728 - NE FREE OSTQ FLAP W/MVASC ANAST METAR/GREAT TOE      Surgeons   Panel 1:     * Fili Chacon - Primary  Panel 2:     * Jamia Thomas - Primary    Resident/Fellow/Other Assistant:  Surgeons and Role:  Panel 1:     * Angel Cruz DO - Resident - Assisting     * Aisha Washington MD - Resident - Assisting     * Aisha Sharp PA-C - PHYLICIA First Assist  Panel 2:     * Tee Narayanan DO - Resident - Assisting    Staff:   Danish: Kiki  Circulator: Akira Haynesub Person: Deloris  Scrub Person: Kely Turner Scrub: Hailee  Relief Scrub: Akira Turner Circulator: Hailee    Anesthesia Staff: Anesthesiologist: Darian Green DO  Anesthesia Resident: Juan Arreguin MD    Procedure Summary  Anesthesia: General  ASA: IV  Estimated Blood Loss: 50mL  Intra-op Medications:   Administrations occurring from 0645 to 1535 on 25:   Medication Name Total Dose   sodium chloride 0.9 % irrigation  solution 2,000 mL   surgical lubricant gel 1 Application   sterile water irrigation solution 1,000 mL   lidocaine (Xylocaine) 20 mg/mL (2 %) injection 50 mL   papaverine injection 60 mg   heparin (porcine) 25,000 Units in sodium chloride 0.9 % 250 mL irrigation 50,000 Units   lidocaine-epinephrine (Xylocaine W/EPI) 1 %-1:100,000 injection 20 mL   mineral oil, light topical 20 mL   acetaminophen (Tylenol) tablet 975 mg   albumin human bottle 5% 500 mL   ampicillin-sulbactam (Unasyn) 3 g 6 g   dexAMETHasone (Decadron) 4 mg/mL 10 mg   fentaNYL (Sublimaze) injection 50 mcg/mL 50 mcg   gabapentin (Neurontin) capsule 600 mg   LR bolus Cannot be calculated   lidocaine PF (cardiac) syringe 2% 100 mg   phenylephrine (Andrea-Synephrine) 10 mg/250 mL NS (40 mcg/mL) infusion 15.23 mg   phenylephrine (Andrea-Synephrine) injection 800 mcg   propofol (Diprivan) injection 10 mg/mL 100 mg   remifentanil (Ultiva) 1,000 mcg in sodium chloride 0.9% 50 mL (20 mcg/mL) infusion 1.54 mg   remifentanil (Ultiva) injection 240 mcg   rocuronium 10 mg/mL 50 mg              Anesthesia Record               Intraprocedure I/O Totals          Intake    Remifentanil Drip 0.00 mL    The total shown is the total volume documented since Anesthesia Start was filed.    Phenylephrine Drip 0.00 mL    The total shown is the total volume documented since Anesthesia Start was filed.    Total Intake 0 mL       Output    Urine 180 mL    Total Output 180 mL       Net    Net Volume -180 mL          Specimen:   ID Type Source Tests Collected by Time   1 : LEFT POSTERIOR GUM, MARGIN MANDIBLE - STITCH ANTERIOR Tissue SOFT TISSUE RESECTION SURGICAL PATHOLOGY EXAM Fili Chacon MD 7/17/2025 0981   2 : MANDIBULAR MARROW TO RULE OUT CANCER Tissue SOFT TISSUE RESECTION SURGICAL PATHOLOGY EXAM Fili Chacon MD 7/17/2025 5649   3 : LEFT LEVEL 1B Tissue SOFT TISSUE RESECTION SURGICAL PATHOLOGY EXAM Fili Chacon MD 7/17/2025 0918                  Findings:   4 cm x 5 cm  mucosal defect in the left posterior oral cavity with marginal mandibulectomy  5 x 6 cm skin paddle planned left radial forearm  Left RFFF harvested with cephalic vein connector to Vcs  Radial artery to left facial artery with 9.0 interrupted suture. One rescue stitch  Flap vein to left EJ with 4.0   Internal doppler on artery  Vestibuloplasty left posterior oral cavity  3 penrose drains left neck  5 x 6 STSG left thigh  10 flat CAMMY drain in the left arm    Complications:  None; patient tolerated the procedure well.     Disposition: PACU - hemodynamically stable.  Condition: stable  Specimens Collected:   ID Type Source Tests Collected by Time   1 : LEFT POSTERIOR GUM, MARGIN MANDIBLE - STITCH ANTERIOR Tissue SOFT TISSUE RESECTION SURGICAL PATHOLOGY EXAM Fili Chacon MD 7/17/2025 0908   2 : MANDIBULAR MARROW TO RULE OUT CANCER Tissue SOFT TISSUE RESECTION SURGICAL PATHOLOGY EXAM Fili Chacon MD 7/17/2025 0902   3 : LEFT LEVEL 1B Tissue SOFT TISSUE RESECTION SURGICAL PATHOLOGY EXAM Fili Chacon MD 7/17/2025 0915     Attending Attestation: I was present and scrubbed for the key portions of the procedure.    Jamia Thomas MD

## 2025-07-17 NOTE — OP NOTE
[facility] - Operative Report  Name: Shola Carbajal   MRN: [id number]  Date of Procedure: 07/17/25  Location: [facility]    Attending Surgeon: Fili Chacon MD     Resident/Fellow: Aisha Ambriz    Preoperative Diagnosis:  Oral cavity cancer, dysphagia    Postoperative Diagnosis:  Same    Operative indications:  This [facility] for the [facility] of a left posterior inferior gum lesion.  The patient understands the surgery and the possible complication and wishes to proceed with the planned surgery.    Procedure:  Direct laryngoscopy, flexible bronchoscopy, gastro esophagoscopy and insertion of a feeding tube, left a composite resection paralysis marginal mandibulectomy and gum resection), left neck exploration for vessels    Operative procedure:  Under general anesthesia the data laryngoscope was introduced.  Careful examination of the oral cavity, oropharynx, hypopharynx, and larynx is carried out.  Besides the known lesion in the oral cavity no other lesion was identified.  The laryngoscope was then suspended over the larynx.  The bronchoscope was inserted into the tracheobronchial tree.  All the different bronchial segments were visualized and are felt to be within normal limits.  The scopes are pulled.  The gastroscope was introduced all the way down to the stomach.  The stomach was inflated.  The abdomen was prepped and draped in usual sterile fashion.  The area to place the PEG was determined by manual pressure and transillumination.  A needle was inserted with negative pressure and no air was obtained until the needle was seen in the stomach.  The tract was injected with Xylocaine 1% to 100,000 epinephrine.  A small cutaneous incision was made.  The trocar and catheter were inserted.  The catheter was retrieved endoscopically with a snare.  The guidewire was then inserted to retrieve the stair and pull intradural cavity.  The PEG tube  was attached to the guidewire and pulled into its proper position on the abdominal wall where it was secured with the provided bumper.  Light dressing with antibiotic ointment was applied around the PEG site.  The tube was secured to the abdomen and put under drainage for the duration of the procedure.  The patient was then prepped and draped in usual sterile fashion.  Working transorally the area to excise in the posterior gum and adjacent buccal area and floor of the mouth area was outlined with the electrocautery.  Cuts were made laterally all the way down to the underlying mandible.  Dental working in the neck and incision was outlined from the mastoid tip to the submandibular area.  The patient had had a prior neck incision and part of that scar was utilized.  A superior flap was elevated to the mandible.  The buccal periosteum was then elevated until the area that had been dissected intraorally.  Using an oscillating saw a marginal mandibulectomy was performed.  The specimen was further mobilized from the surrounding soft tissue and removed and oriented and sent for frozen section.  Level 1B was then dissected exposing the facial vessels that were dissected and Therefore possibly used for reconstruction.  The lingual nerve was preserved.  Some marrow was also obtained for frozen section.  Using a cutting bur a little bit more of the marginal portion of the bone was removed.  The wound was irrigated.  Hemostasis was verified.  The frozen sections came back negative.  The patient was then transferred to the care of the reconstruction team.  This will be dictated separately.  At the time of the transfer the patient was in stable condition and the estimated blood loss was approximately 20 cc.  This is dictated on 7/17/2025.    I was present for the entire procedure    Fili Chacon MD

## 2025-07-17 NOTE — ANESTHESIA PROCEDURE NOTES
Arterial Line:    Date/Time: 7/17/2025 8:14 AM    Staffing  Performed: resident   Authorized by: Darian Green DO    Performed by: Juan Arreguin MD    An arterial line was placed. Procedure performed using surface landmarks.in the OR for the following indication(s): continuous blood pressure monitoring and blood sampling needed.    A 20 gauge (size), 1 and 3/4 inch (length), Angiocath (type) catheter was placed into the Right radial artery, secured by Tegaderm,   Seldinger technique used.  Events:  patient tolerated procedure well with no complications.

## 2025-07-17 NOTE — SIGNIFICANT EVENT
Patient has a DNR order that he is revoking for surgery.     Spoke at length with patient who understands needs to reverse DNR during surgery and for the first 48 hours after surgery.     He expressed understanding and is ready to reverse DNR for 48 hours and proceed with surgery.     Wife and daughter at bedside who agreed with plan.     Alyse Mir MD- Fellow

## 2025-07-17 NOTE — CONSULTS
"Reason for Consult:    Subjective   80 y.o. 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 Cardiology consulted for co-management given cardiac hx.    Patient seen on the PACU, shortly after surgery. Denies any symptoms prior to presenting to the hospital this time. Denies CP, SOB, palpitation, diaphoresis, DANGELO. Denies weight gain and orthopnea and endorses that he was doing OK prior to the OR.     [facility] Has known significant coronary disease that was not amenable to intervention to the LAD given diffuse nature of the disease.    Prior results:    CATH 10/2021: ( For drop in LVEF and dangelo)  RA 8  PA 26/10 m14  PAW m12  Rt dominant system  Diffusely calcific coronaries  LMCA: Calcific irregularities  LAD: Heavily clacific vessel with 80-90% midsegment lesion, at the bifurcation   into a diagonal. LAD stops short of apex  LCx: 30% ostial lesion and nonobstructive irregularities  RCA: Toally occluded at midsegment in-stent 100%, with occluded stents into   PDA Distal small caliber PLV is seen filling from left    Review of Systems  Pertinent items are noted in HPI.     Objective   Visit Vitals  /65   Pulse 82   Temp 36 °C (96.8 °F) (Temporal)   Resp 18   Ht 1.778 m (5' 10\")   Wt 76.9 kg (169 lb 8.5 oz)   SpO2 96%   BMI 24.33 kg/m²   Smoking Status Every Day   BSA 1.95 m²        Physical Exam  General: awake, slightly sedated given post anesthesia  HEENT: no scleral icterus, no JVD. Surgical scar noted on the L side of the face with drain in place  CV: RRR, with distant heart sounds. Otherwise no murmurs noted  Resp: Diffuse ronchi noted. No creptations or wheezes  Abd: soft, NT/ND  LE: no edema, no cyanosis  Neuo: grossly normal  Psych: pleasant      Echocardiogram: No " "results for input(s): \"EF\", \"LVIDD\", \"IVS\", \"RV\", \"RVFRWALLPKSP\", \"TAPSE\" in the last 04221 hours.No results found for this or any previous visit from the past 1800 days.    Coronary Angiography: No results found for this or any previous visit from the past 1800 days.    Cardiac Scoring: No results found for this or any previous visit from the past 1800 days.    Cardiac MRI: No results found for this or any previous visit from the past 1800 days.    Nuclear:No results found for this or any previous visit from the past 1800 days.    Metabolic Stress: No results found for this or any previous visit from the past 1800 days.      Lab Review   Results for orders placed or performed during the hospital encounter of 07/17/25 (from the past 24 hours)   Verify ABO/Rh Group Test (VERAB)   Result Value Ref Range    ABO TYPE O     Rh TYPE POS    Blood Gas Arterial Full Panel   Result Value Ref Range    POCT pH, Arterial 7.30 (L) 7.38 - 7.42 pH    POCT pCO2, Arterial 45 (H) 38 - 42 mm Hg    POCT pO2, Arterial 103 (H) 85 - 95 mm Hg    POCT SO2, Arterial 100 94 - 100 %    POCT Oxy Hemoglobin, Arterial 94.6 94.0 - 98.0 %    POCT Hematocrit Calculated, Arterial 33.0 (L) 41.0 - 52.0 %    POCT Sodium, Arterial 136 136 - 145 mmol/L    POCT Potassium, Arterial 3.6 3.5 - 5.3 mmol/L    POCT Chloride, Arterial 109 (H) 98 - 107 mmol/L    POCT Ionized Calcium, Arterial 1.12 1.10 - 1.33 mmol/L    POCT Glucose, Arterial 85 74 - 99 mg/dL    POCT Lactate, Arterial 0.5 0.4 - 2.0 mmol/L    POCT Base Excess, Arterial -4.3 (L) -2.0 - 3.0 mmol/L    POCT HCO3 Calculated, Arterial 22.1 22.0 - 26.0 mmol/L    POCT Hemoglobin, Arterial 10.9 (L) 13.5 - 17.5 g/dL    POCT Anion Gap, Arterial 9 (L) 10 - 25 mmo/L    Patient Temperature 37.0 degrees Celsius    FiO2 40 %   Blood Gas Arterial Full Panel   Result Value Ref Range    POCT pH, Arterial 7.29 (L) 7.38 - 7.42 pH    POCT pCO2, Arterial 51 (H) 38 - 42 mm Hg    POCT pO2, Arterial 92 85 - 95 mm Hg    POCT " "SO2, Arterial 98 94 - 100 %    POCT Oxy Hemoglobin, Arterial 93.8 (L) 94.0 - 98.0 %    POCT Hematocrit Calculated, Arterial 42.0 41.0 - 52.0 %    POCT Sodium, Arterial 135 (L) 136 - 145 mmol/L    POCT Potassium, Arterial 4.1 3.5 - 5.3 mmol/L    POCT Chloride, Arterial 103 98 - 107 mmol/L    POCT Ionized Calcium, Arterial 1.25 1.10 - 1.33 mmol/L    POCT Glucose, Arterial 116 (H) 74 - 99 mg/dL    POCT Lactate, Arterial 0.6 0.4 - 2.0 mmol/L    POCT Base Excess, Arterial -2.7 (L) -2.0 - 3.0 mmol/L    POCT HCO3 Calculated, Arterial 24.5 22.0 - 26.0 mmol/L    POCT Hemoglobin, Arterial 13.9 13.5 - 17.5 g/dL    POCT Anion Gap, Arterial 12 10 - 25 mmo/L    Patient Temperature 37.0 degrees Celsius    FiO2 40 %   Blood Gas Arterial Full Panel   Result Value Ref Range    POCT pH, Arterial 7.33 (L) 7.38 - 7.42 pH    POCT pCO2, Arterial 44 (H) 38 - 42 mm Hg    POCT pO2, Arterial 109 (H) 85 - 95 mm Hg    POCT SO2, Arterial 99 94 - 100 %    POCT Oxy Hemoglobin, Arterial 95.3 94.0 - 98.0 %    POCT Hematocrit Calculated, Arterial 36.0 (L) 41.0 - 52.0 %    POCT Sodium, Arterial 136 136 - 145 mmol/L    POCT Potassium, Arterial 4.3 3.5 - 5.3 mmol/L    POCT Chloride, Arterial 107 98 - 107 mmol/L    POCT Ionized Calcium, Arterial 1.20 1.10 - 1.33 mmol/L    POCT Glucose, Arterial 128 (H) 74 - 99 mg/dL    POCT Lactate, Arterial 0.8 0.4 - 2.0 mmol/L    POCT Base Excess, Arterial -2.8 (L) -2.0 - 3.0 mmol/L    POCT HCO3 Calculated, Arterial 23.2 22.0 - 26.0 mmol/L    POCT Hemoglobin, Arterial 12.1 (L) 13.5 - 17.5 g/dL    POCT Anion Gap, Arterial 10 10 - 25 mmo/L    Patient Temperature 37.0 degrees Celsius    FiO2 40 %         No results found for: \"TROPHS\", \"BNP\", \"LDLF\", \"TRIG\", \"NMRTOT\"    Results for orders placed or performed during the hospital encounter of 07/17/25 (from the past 24 hours)   Verify ABO/Rh Group Test (VERAB)   Result Value Ref Range    ABO TYPE O     Rh TYPE POS    Blood Gas Arterial Full Panel   Result Value Ref Range    " POCT pH, Arterial 7.30 (L) 7.38 - 7.42 pH    POCT pCO2, Arterial 45 (H) 38 - 42 mm Hg    POCT pO2, Arterial 103 (H) 85 - 95 mm Hg    POCT SO2, Arterial 100 94 - 100 %    POCT Oxy Hemoglobin, Arterial 94.6 94.0 - 98.0 %    POCT Hematocrit Calculated, Arterial 33.0 (L) 41.0 - 52.0 %    POCT Sodium, Arterial 136 136 - 145 mmol/L    POCT Potassium, Arterial 3.6 3.5 - 5.3 mmol/L    POCT Chloride, Arterial 109 (H) 98 - 107 mmol/L    POCT Ionized Calcium, Arterial 1.12 1.10 - 1.33 mmol/L    POCT Glucose, Arterial 85 74 - 99 mg/dL    POCT Lactate, Arterial 0.5 0.4 - 2.0 mmol/L    POCT Base Excess, Arterial -4.3 (L) -2.0 - 3.0 mmol/L    POCT HCO3 Calculated, Arterial 22.1 22.0 - 26.0 mmol/L    POCT Hemoglobin, Arterial 10.9 (L) 13.5 - 17.5 g/dL    POCT Anion Gap, Arterial 9 (L) 10 - 25 mmo/L    Patient Temperature 37.0 degrees Celsius    FiO2 40 %   Blood Gas Arterial Full Panel   Result Value Ref Range    POCT pH, Arterial 7.29 (L) 7.38 - 7.42 pH    POCT pCO2, Arterial 51 (H) 38 - 42 mm Hg    POCT pO2, Arterial 92 85 - 95 mm Hg    POCT SO2, Arterial 98 94 - 100 %    POCT Oxy Hemoglobin, Arterial 93.8 (L) 94.0 - 98.0 %    POCT Hematocrit Calculated, Arterial 42.0 41.0 - 52.0 %    POCT Sodium, Arterial 135 (L) 136 - 145 mmol/L    POCT Potassium, Arterial 4.1 3.5 - 5.3 mmol/L    POCT Chloride, Arterial 103 98 - 107 mmol/L    POCT Ionized Calcium, Arterial 1.25 1.10 - 1.33 mmol/L    POCT Glucose, Arterial 116 (H) 74 - 99 mg/dL    POCT Lactate, Arterial 0.6 0.4 - 2.0 mmol/L    POCT Base Excess, Arterial -2.7 (L) -2.0 - 3.0 mmol/L    POCT HCO3 Calculated, Arterial 24.5 22.0 - 26.0 mmol/L    POCT Hemoglobin, Arterial 13.9 13.5 - 17.5 g/dL    POCT Anion Gap, Arterial 12 10 - 25 mmo/L    Patient Temperature 37.0 degrees Celsius    FiO2 40 %   Blood Gas Arterial Full Panel   Result Value Ref Range    POCT pH, Arterial 7.33 (L) 7.38 - 7.42 pH    POCT pCO2, Arterial 44 (H) 38 - 42 mm Hg    POCT pO2, Arterial 109 (H) 85 - 95 mm Hg     POCT SO2, Arterial 99 94 - 100 %    POCT Oxy Hemoglobin, Arterial 95.3 94.0 - 98.0 %    POCT Hematocrit Calculated, Arterial 36.0 (L) 41.0 - 52.0 %    POCT Sodium, Arterial 136 136 - 145 mmol/L    POCT Potassium, Arterial 4.3 3.5 - 5.3 mmol/L    POCT Chloride, Arterial 107 98 - 107 mmol/L    POCT Ionized Calcium, Arterial 1.20 1.10 - 1.33 mmol/L    POCT Glucose, Arterial 128 (H) 74 - 99 mg/dL    POCT Lactate, Arterial 0.8 0.4 - 2.0 mmol/L    POCT Base Excess, Arterial -2.8 (L) -2.0 - 3.0 mmol/L    POCT HCO3 Calculated, Arterial 23.2 22.0 - 26.0 mmol/L    POCT Hemoglobin, Arterial 12.1 (L) 13.5 - 17.5 g/dL    POCT Anion Gap, Arterial 10 10 - 25 mmo/L    Patient Temperature 37.0 degrees Celsius    FiO2 40 %       Assessment/Plan     Impression and Recommendations:    80 y.o. 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 Cardiology consulted for co-management given cardiac hx.    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  :: on home aspirin, atorvastatin 40mg, furosemide 60mg every day, metop succinate 12.5mg every day, entresto 24-26, petros 12.5mg every day  - ok holding aspirin for a few days if bleeding concerns since distance from event. Would resume as feasible  - recommend c/w atorva, metop and petros  - Can start on low dose entresto 12/13 since patient post-OR with BPs slightly elevated  - If patient received significant volume during the OR, can give 1x of furosemide 60mg IV and re-assess  - Formal TTE in the AM.       Td Owen,  MD  PGY-5 Cardiovascular Medicine Fellow    Thank you for the opportunity to contribute to the care of this patient. Above recommendations are preliminary until note signed by attending Dr. Hawthorne.     If further questions arise, please page the general cardiology consult pager at [phone] on weekdays 7AM - 6PM and weekends 7AM - 2PM, or at [phone] at all other times.

## 2025-07-17 NOTE — ANESTHESIA POSTPROCEDURE EVALUATION
Patient: Shola Carbajal    Procedure Summary       Date: 07/17/25 Room / Location: [facility] OR 03 / [facility] OR    Anesthesia Start: 0744 Anesthesia Stop: 1715    Procedures:       LARYNGOSCOPY      BRONCHOSCOPY      ESOPHAGOSCOPY      PEG TUBE INSERTION      TRACHEOSTOMY      MANDIBULECTOMY (Left)      LEFT COMPOSITE RESECTION, LEFT NECK EXPLORATION FOR VESSELS (Left)      SKIN GRAFT      MANDIBLE RECONSTRUCTION (Left)      FREE FLAP (Left) Diagnosis:       Cancer of oral cavity (Multi)      (Cancer of oral cavity (Multi) [C06.9])    Surgeons: Fili Chacon MD; Jamia Thomas MD Responsible Provider: Darian Green DO    Anesthesia Type: general ASA Status: 4            Anesthesia Type: general    Vitals Value Taken Time   /70 07/17/25 17:30   Temp 36.2 07/17/25 17:38   Pulse 80 07/17/25 17:32   Resp 12 07/17/25 17:32   SpO2 90 % 07/17/25 17:32   Vitals shown include unfiled device data.    Anesthesia Post Evaluation    Patient location during evaluation: PACU  Patient participation: complete - patient participated  Level of consciousness: awake  Pain score: 0  Pain management: adequate  Multimodal analgesia pain management approach  Airway patency: patent  Two or more strategies used to mitigate risk of obstructive sleep apnea  Cardiovascular status: acceptable  Respiratory status: face mask  Hydration status: acceptable  Postoperative Nausea and Vomiting: none        No notable events documented.

## 2025-07-18 ENCOUNTER — APPOINTMENT (OUTPATIENT)
Dept: CARDIOLOGY | Facility: HOSPITAL | Age: 80
DRG: 012 | End: 2025-07-18
Payer: MEDICARE

## 2025-07-18 LAB
ALBUMIN SERPL BCP-MCNC: 3.8 G/DL (ref 3.4–5)
ANION GAP SERPL CALC-SCNC: 14 MMOL/L (ref 10–20)
AORTIC VALVE PEAK VELOCITY: 1.05 M/S
AV PEAK GRADIENT: 4 MMHG
AVA (PEAK VEL): 2.11 CM2
BUN SERPL-MCNC: 28 MG/DL (ref 6–23)
CALCIUM SERPL-MCNC: 9.1 MG/DL (ref 8.6–10.6)
CHLORIDE SERPL-SCNC: 105 MMOL/L (ref 98–107)
CO2 SERPL-SCNC: 26 MMOL/L (ref 21–32)
CREAT SERPL-MCNC: 1.43 MG/DL (ref 0.5–1.3)
EGFRCR SERPLBLD CKD-EPI 2021: 50 ML/MIN/1.73M*2
ERYTHROCYTE [DISTWIDTH] IN BLOOD BY AUTOMATED COUNT: 14.6 % (ref 11.5–14.5)
GLUCOSE BLD MANUAL STRIP-MCNC: 104 MG/DL (ref 74–99)
GLUCOSE BLD MANUAL STRIP-MCNC: 111 MG/DL (ref 74–99)
GLUCOSE BLD MANUAL STRIP-MCNC: 114 MG/DL (ref 74–99)
GLUCOSE BLD MANUAL STRIP-MCNC: 124 MG/DL (ref 74–99)
GLUCOSE SERPL-MCNC: 105 MG/DL (ref 74–99)
HCT VFR BLD AUTO: 27.5 % (ref 41–52)
HGB BLD-MCNC: 8.9 G/DL (ref 13.5–17.5)
LEFT VENTRICLE INTERNAL DIMENSION DIASTOLE: 4.4 CM (ref 3.5–6)
LEFT VENTRICULAR OUTFLOW TRACT DIAMETER: 2 CM
MAGNESIUM SERPL-MCNC: 1.95 MG/DL (ref 1.6–2.4)
MCH RBC QN AUTO: 31.6 PG (ref 26–34)
MCHC RBC AUTO-ENTMCNC: 32.4 G/DL (ref 32–36)
MCV RBC AUTO: 98 FL (ref 80–100)
NRBC BLD-RTO: 0 /100 WBCS (ref 0–0)
PHOSPHATE SERPL-MCNC: 3.1 MG/DL (ref 2.5–4.9)
PLATELET # BLD AUTO: 148 X10*3/UL (ref 150–450)
POTASSIUM SERPL-SCNC: 4.6 MMOL/L (ref 3.5–5.3)
RBC # BLD AUTO: 2.82 X10*6/UL (ref 4.5–5.9)
RIGHT VENTRICLE FREE WALL PEAK S': 6.13 CM/S
RIGHT VENTRICLE PEAK SYSTOLIC PRESSURE: 29 MMHG
SODIUM SERPL-SCNC: 140 MMOL/L (ref 136–145)
TRICUSPID ANNULAR PLANE SYSTOLIC EXCURSION: 1.6 CM
WBC # BLD AUTO: 14.1 X10*3/UL (ref 4.4–11.3)

## 2025-07-18 PROCEDURE — 36415 COLL VENOUS BLD VENIPUNCTURE: CPT | Performed by: STUDENT IN AN ORGANIZED HEALTH CARE EDUCATION/TRAINING PROGRAM

## 2025-07-18 PROCEDURE — 2500000001 HC RX 250 WO HCPCS SELF ADMINISTERED DRUGS (ALT 637 FOR MEDICARE OP): Performed by: STUDENT IN AN ORGANIZED HEALTH CARE EDUCATION/TRAINING PROGRAM

## 2025-07-18 PROCEDURE — 85027 COMPLETE CBC AUTOMATED: CPT | Performed by: STUDENT IN AN ORGANIZED HEALTH CARE EDUCATION/TRAINING PROGRAM

## 2025-07-18 PROCEDURE — 93306 TTE W/DOPPLER COMPLETE: CPT | Performed by: SPECIALIST

## 2025-07-18 PROCEDURE — 97530 THERAPEUTIC ACTIVITIES: CPT | Mod: GP

## 2025-07-18 PROCEDURE — 1200000003 HC ONCOLOGY  ROOM WITH TELEMETRY DAILY

## 2025-07-18 PROCEDURE — 82947 ASSAY GLUCOSE BLOOD QUANT: CPT

## 2025-07-18 PROCEDURE — C8929 TTE W OR WO FOL WCON,DOPPLER: HCPCS

## 2025-07-18 PROCEDURE — 2500000001 HC RX 250 WO HCPCS SELF ADMINISTERED DRUGS (ALT 637 FOR MEDICARE OP): Performed by: NURSE PRACTITIONER

## 2025-07-18 PROCEDURE — 2500000004 HC RX 250 GENERAL PHARMACY W/ HCPCS (ALT 636 FOR OP/ED): Mod: JW | Performed by: STUDENT IN AN ORGANIZED HEALTH CARE EDUCATION/TRAINING PROGRAM

## 2025-07-18 PROCEDURE — 99232 SBSQ HOSP IP/OBS MODERATE 35: CPT | Performed by: NURSE PRACTITIONER

## 2025-07-18 PROCEDURE — 2500000005 HC RX 250 GENERAL PHARMACY W/O HCPCS: Performed by: STUDENT IN AN ORGANIZED HEALTH CARE EDUCATION/TRAINING PROGRAM

## 2025-07-18 PROCEDURE — 83735 ASSAY OF MAGNESIUM: CPT | Performed by: STUDENT IN AN ORGANIZED HEALTH CARE EDUCATION/TRAINING PROGRAM

## 2025-07-18 PROCEDURE — 80069 RENAL FUNCTION PANEL: CPT | Performed by: STUDENT IN AN ORGANIZED HEALTH CARE EDUCATION/TRAINING PROGRAM

## 2025-07-18 PROCEDURE — 97161 PT EVAL LOW COMPLEX 20 MIN: CPT | Mod: GP

## 2025-07-18 PROCEDURE — 2500000004 HC RX 250 GENERAL PHARMACY W/ HCPCS (ALT 636 FOR OP/ED): Performed by: STUDENT IN AN ORGANIZED HEALTH CARE EDUCATION/TRAINING PROGRAM

## 2025-07-18 PROCEDURE — 99222 1ST HOSP IP/OBS MODERATE 55: CPT

## 2025-07-18 RX ORDER — OXYCODONE HCL 5 MG/5 ML
10 SOLUTION, ORAL ORAL EVERY 4 HOURS PRN
Refills: 0 | Status: DISCONTINUED | OUTPATIENT
Start: 2025-07-18 | End: 2025-07-22 | Stop reason: HOSPADM

## 2025-07-18 RX ORDER — INSULIN LISPRO 100 [IU]/ML
0-5 INJECTION, SOLUTION INTRAVENOUS; SUBCUTANEOUS EVERY 6 HOURS
Status: DISCONTINUED | OUTPATIENT
Start: 2025-07-18 | End: 2025-07-18

## 2025-07-18 RX ORDER — OXYCODONE HCL 5 MG/5 ML
5 SOLUTION, ORAL ORAL EVERY 4 HOURS PRN
Refills: 0 | Status: DISCONTINUED | OUTPATIENT
Start: 2025-07-18 | End: 2025-07-22 | Stop reason: HOSPADM

## 2025-07-18 RX ADMIN — ASPIRIN 325 MG ORAL TABLET 325 MG: 325 PILL ORAL at 08:52

## 2025-07-18 RX ADMIN — HYDROGEN PEROXIDE 1 APPLICATION: 30 SOLUTION TOPICAL at 08:52

## 2025-07-18 RX ADMIN — Medication 50 MCG: at 08:52

## 2025-07-18 RX ADMIN — ATORVASTATIN CALCIUM 40 MG: 40 TABLET, FILM COATED ORAL at 21:31

## 2025-07-18 RX ADMIN — HUMAN ALBUMIN MICROSPHERES AND PERFLUTREN 1.75 ML: 10; .22 INJECTION, SOLUTION INTRAVENOUS at 16:22

## 2025-07-18 RX ADMIN — HYDROGEN PEROXIDE 1 APPLICATION: 30 SOLUTION TOPICAL at 15:18

## 2025-07-18 RX ADMIN — HYDROGEN PEROXIDE 1 APPLICATION: 30 SOLUTION TOPICAL at 21:31

## 2025-07-18 RX ADMIN — ACETAMINOPHEN 1000 MG: 160 SOLUTION ORAL at 13:14

## 2025-07-18 RX ADMIN — AMPICILLIN SODIUM AND SULBACTAM SODIUM 3 G: 2; 1 INJECTION, POWDER, FOR SOLUTION INTRAMUSCULAR; INTRAVENOUS at 08:40

## 2025-07-18 RX ADMIN — METOPROLOL TARTRATE 6.25 MG: 25 TABLET, FILM COATED ORAL at 08:52

## 2025-07-18 RX ADMIN — BACITRACIN 1 APPLICATION: 500 OINTMENT TOPICAL at 08:52

## 2025-07-18 RX ADMIN — CHLORHEXIDINE GLUCONATE 0.12% ORAL RINSE 15 ML: 1.2 LIQUID ORAL at 08:51

## 2025-07-18 RX ADMIN — CHLORHEXIDINE GLUCONATE 0.12% ORAL RINSE 15 ML: 1.2 LIQUID ORAL at 17:28

## 2025-07-18 RX ADMIN — AMPICILLIN SODIUM AND SULBACTAM SODIUM 3 G: 2; 1 INJECTION, POWDER, FOR SOLUTION INTRAMUSCULAR; INTRAVENOUS at 20:04

## 2025-07-18 RX ADMIN — CHLORHEXIDINE GLUCONATE 0.12% ORAL RINSE 15 ML: 1.2 LIQUID ORAL at 13:13

## 2025-07-18 RX ADMIN — AMPICILLIN SODIUM AND SULBACTAM SODIUM 3 G: 2; 1 INJECTION, POWDER, FOR SOLUTION INTRAMUSCULAR; INTRAVENOUS at 01:07

## 2025-07-18 RX ADMIN — AMPICILLIN SODIUM AND SULBACTAM SODIUM 3 G: 2; 1 INJECTION, POWDER, FOR SOLUTION INTRAMUSCULAR; INTRAVENOUS at 14:25

## 2025-07-18 RX ADMIN — ENOXAPARIN SODIUM 40 MG: 100 INJECTION SUBCUTANEOUS at 08:52

## 2025-07-18 RX ADMIN — ACETAMINOPHEN 1000 MG: 160 SOLUTION ORAL at 21:31

## 2025-07-18 RX ADMIN — METOPROLOL TARTRATE 6.25 MG: 25 TABLET, FILM COATED ORAL at 21:31

## 2025-07-18 RX ADMIN — BACITRACIN 1 APPLICATION: 500 OINTMENT TOPICAL at 21:31

## 2025-07-18 RX ADMIN — BACITRACIN 1 APPLICATION: 500 OINTMENT TOPICAL at 15:18

## 2025-07-18 RX ADMIN — OXYCODONE HYDROCHLORIDE 5 MG: 5 SOLUTION ORAL at 17:28

## 2025-07-18 RX ADMIN — PANTOPRAZOLE SODIUM 40 MG: 40 INJECTION, POWDER, LYOPHILIZED, FOR SOLUTION INTRAVENOUS at 08:40

## 2025-07-18 ASSESSMENT — ACTIVITIES OF DAILY LIVING (ADL)
ADL_ASSISTANCE: INDEPENDENT
LACK_OF_TRANSPORTATION: NO

## 2025-07-18 ASSESSMENT — COGNITIVE AND FUNCTIONAL STATUS - GENERAL
WALKING IN HOSPITAL ROOM: A LITTLE
TOILETING: A LITTLE
DRESSING REGULAR UPPER BODY CLOTHING: A LITTLE
DAILY ACTIVITIY SCORE: 18
STANDING UP FROM CHAIR USING ARMS: A LITTLE
HELP NEEDED FOR BATHING: A LITTLE
TURNING FROM BACK TO SIDE WHILE IN FLAT BAD: A LITTLE
CLIMB 3 TO 5 STEPS WITH RAILING: A LITTLE
MOBILITY SCORE: 19
STANDING UP FROM CHAIR USING ARMS: A LITTLE
MOBILITY SCORE: 18
MOVING FROM LYING ON BACK TO SITTING ON SIDE OF FLAT BED WITH BEDRAILS: A LITTLE
EATING MEALS: A LITTLE
CLIMB 3 TO 5 STEPS WITH RAILING: A LITTLE
MOVING TO AND FROM BED TO CHAIR: A LITTLE
MOVING TO AND FROM BED TO CHAIR: A LITTLE
WALKING IN HOSPITAL ROOM: A LITTLE
PERSONAL GROOMING: A LITTLE
TURNING FROM BACK TO SIDE WHILE IN FLAT BAD: A LITTLE
DRESSING REGULAR LOWER BODY CLOTHING: A LITTLE

## 2025-07-18 ASSESSMENT — PAIN SCALES - GENERAL
PAINLEVEL_OUTOF10: 1
PAINLEVEL_OUTOF10: 4
PAINLEVEL_OUTOF10: 1

## 2025-07-18 ASSESSMENT — PAIN DESCRIPTION - DESCRIPTORS
DESCRIPTORS: DISCOMFORT
DESCRIPTORS: DISCOMFORT

## 2025-07-18 ASSESSMENT — PAIN - FUNCTIONAL ASSESSMENT
PAIN_FUNCTIONAL_ASSESSMENT: 0-10

## 2025-07-18 ASSESSMENT — PAIN DESCRIPTION - LOCATION: LOCATION: ARM

## 2025-07-18 ASSESSMENT — PAIN DESCRIPTION - ORIENTATION: ORIENTATION: LEFT

## 2025-07-18 NOTE — SIGNIFICANT EVENT
"ENT Flap Check  Shola Carbajal is a 80 y.o. male who on 7/17/2025 underwent PEG, left oral cavity composite resection, left ND level 1B, left radial forearm free flap with Nan Chacon and William    Subjective  Resting comfortably in bed. No complaints. Pain is well-controlled. Holding off on starting BP meds due to patient being normotensive.     Physical Examination\"  Vitals: /61   Pulse 100   Temp 37.1 °C (98.8 °F) (Temporal)   Resp 16   Ht 1.778 m (5' 10\")   Wt 76.9 kg (169 lb 8.5 oz)   SpO2 94%   BMI 24.33 kg/m²   Gen: NAD, AOx3, resting comfortably in bed  Face/Neck: All incisions c/d/i, neck soft and flat without evidence of hematoma or fluid collection, penroses (x3) with appropriate output after milking  -External doppler with strong biphasic arterial signal   Oral Cavity: All intraoral incisions c/d/i without evidence of dehiscence  -Flap soft and pink with good capillary refill, stable lateral area of bruising as previously seen in PACU  Extremities: Left arm warm with intact movement and sensation  - thigh skin graft donor site with tegaderm in place  Abdomen: PEG in place  Drains: Penroses x3 - milked, Arm drain in place and holding suction with serosanguinous drainage (stripped)    Assessment:  Shola Carbajal is a 80 y.o. male who underwent PEG, left oral cavity composite resection, left ND level 1B, left radial forearm free flap on 7/17/2025. Flap check  stable from initial evaluation in PACU.    Plan:  -Flap Checks: q6hrs - will additionally evaluate at 10 PM and 2 AM  -Drains: Monitor output  -Analgesia: PCA  -FEN: mIVFs, NPO, Tfs PEG tube to gravity  -Cardio: Cardiology team following due to heart failure history, plan for formal echo 7/18  -ID: Unasyn x24 hrs    Giovanny Davis MD  [id number]    "

## 2025-07-18 NOTE — CARE PLAN
The patient's goals for the shift include      The clinical goals for the shift include flap stability and vital sign stabiliity      Problem: Heart Failure  Goal: Improved gas exchange this shift  Outcome: Progressing  Goal: Improved urinary output this shift  Outcome: Progressing  Goal: Reduction in peripheral edema within 24 hours  Outcome: Progressing  Goal: Report improvement of dyspnea/breathlessness this shift  Outcome: Progressing  Goal: Weight from fluid excess reduced over 2-3 days, then stabilize  Outcome: Progressing  Goal: Increase self care and/or family involvement in 24 hours  Outcome: Progressing     Problem: Pain - Adult  Goal: Verbalizes/displays adequate comfort level or baseline comfort level  Outcome: Progressing     Problem: Safety - Adult  Goal: Free from fall injury  Outcome: Progressing     Problem: Discharge Planning  Goal: Discharge to home or other facility with appropriate resources  Outcome: Progressing     Problem: Chronic Conditions and Co-morbidities  Goal: Patient's chronic conditions and co-morbidity symptoms are monitored and maintained or improved  Outcome: Progressing     Problem: Nutrition  Goal: Nutrient intake appropriate for maintaining nutritional needs  Outcome: Progressing

## 2025-07-18 NOTE — NURSING NOTE
Throughout shift pt participated in education on peg tube and tube feeding. Pt able to clamp and unclamp peg tube as well as take out end stopper on peg tube. Will continue to encourage independence with peg tube.

## 2025-07-18 NOTE — PROGRESS NOTES
Physical Therapy    Physical Therapy Evaluation & Treatment    Patient Name: Shola Carbajal  MRN: [id number]  Department: [facility]  Room: [address] [place] [address]  Today's Date: 7/18/2025   Time Calculation  Start Time: 0953  Stop Time: 1024  Time Calculation (min): 31 min    Assessment/Plan   PT Assessment  PT Assessment Results: Decreased endurance, Decreased strength, Pain  Rehab Prognosis: Good  Barriers to Discharge Home: No anticipated barriers  Evaluation/Treatment Tolerance: Patient tolerated treatment well  Medical Staff Made Aware: Yes  Strengths: Ability to acquire knowledge, Attitude of self, Coping skills, Housing layout, Support of Caregivers  Barriers to Participation: Comorbidities  End of Session Communication: Bedside nurse  Assessment Comment: Pt completed bed mobility with supervision with cues to roll onto side. Demonstrated STS and ambulation to nearby recliner with FWW and CGA, cueing to avoid WB through LUE. Pt is appropriate for low intensity therapy following his current acute stay, skilled PT to continue to follow.  End of Session Patient Position: Up in chair, Alarm off, caregiver present   IP OR SWING BED PT PLAN  Inpatient or Swing Bed: Inpatient  PT Plan  PT Plan: Ongoing PT  PT Frequency: 3 times per week  PT Discharge Recommendations: Low intensity level of continued care  Equipment Recommended upon Discharge: Wheeled walker  PT Recommended Transfer Status: Contact guard, Assistive device  PT - OK to Discharge: Yes (Meaning PT has evaluated and dc rec made)      Subjective     PT Visit Info:  PT Received On: 07/18/25  General Visit Information:  General  Reason for Referral: 7/17/2025 underwent PEG, left oral cavity composite resection, left ND level 1B, left radial forearm free flap  Past Medical History Relevant to Rehab: CAD s/p PCI to RCA and severe LAD, 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  Home Living:  Home Living  Type of Home: House  Lives With: Spouse  Home Adaptive Equipment: Cane  Home Layout: One level  Home Access: Stairs to enter with rails  Entrance Stairs-Number of Steps: 6  Bathroom Shower/Tub: Tub/shower unit  Bathroom Equipment: Grab bars in shower  Home Living Comments: Pt lives in ranch style home with supportive wife. 6 SARAH with railing, bed, bath, and laundry all on the main floor.  Prior Level of Function:  Prior Function Per Pt/Caregiver Report  Level of [place]: Independent with ADLs and functional transfers  ADL Assistance: Independent  Homemaking Assistance: Independent  Ambulatory Assistance: Independent  Vocational: Retired  Leisure: Games  Prior Function Comments: Pt reports independence with daily activities at baseline. Wife is also retired and at home all the time. Many children nearby to help if needed.  Precautions:  Precautions  Hearing/Visual Limitations: [place]  UE Weight Bearing Status: Other (Comment) (L WBAT, no pushing up from seated)  Medical Precautions: Fall precautions     Date/Time Vitals Session Patient Position Pulse Resp SpO2 BP MAP (mmHg)    07/18/25 0953 Pre PT  --  105  --  95 %  116/66  --     07/18/25 1014 --  --  105  16  95 %  116/66  83     07/18/25 1024 --  --  --  --  93 %  --  --     07/18/25 1113 --  --  95  16  92 %  93/55  68         Objective   Pain:  Pain Assessment  Pain Assessment: 0-10  0-10 (Numeric) Pain Score: 1  Pain Type: Surgical pain  Pain Location: Neck  Pain Descriptors: Discomfort  Pain Interventions: Repositioned, Ambulation/increased activity  Response to Interventions: Other (Comment) (Tolerated PT)  Cognition:  Cognition  Overall Cognitive Status: Within Functional Limits  Orientation Level: Oriented  X4    General Assessments:  Activity Tolerance  Endurance: Tolerates 10 - 20 min exercise with multiple rests    Sensation  Light Touch: No apparent deficits  Sensation Comment: Reports left leg has been occasionally falling asleep after surgery    Strength  Strength Comments: >3/5 based on functional testing  Perception  Inattention/Neglect: Appears intact      Coordination  Movements are Fluid and Coordinated: Yes    Postural Control  Postural Control: Within Functional Limits    Static Sitting Balance  Static Sitting-Balance Support: Bilateral upper extremity supported, Feet supported  Static Sitting-Level of Assistance: Close supervision  Static Sitting-Comment/Number of Minutes: Sitting EOB  Dynamic Sitting Balance  Dynamic Sitting-Balance Support: Bilateral upper extremity supported, Feet supported  Dynamic Sitting-Level of Assistance: Close supervision  Dynamic Sitting-Balance: Forward lean, Lateral lean  Dynamic Sitting-Comments: Sitting EOB    Static Standing Balance  Static Standing-Balance Support: Bilateral upper extremity supported  Static Standing-Level of Assistance: Close supervision  Static Standing-Comment/Number of Minutes: FWW prior to ambulation  Dynamic Standing Balance  Dynamic Standing-Balance Support: Bilateral upper extremity supported  Dynamic Standing-Level of Assistance: Close supervision  Dynamic Standing-Balance: Turning  Dynamic Standing-Comments: Turning with FWW, no LOB  Functional Assessments:  Bed Mobility  Bed Mobility: Yes  Bed Mobility 1  Bed Mobility 1: Supine to sitting, Log roll  Level of Assistance 1: Close supervision  Bed Mobility Comments 1: Cues to roll onto side    Transfers  Transfer: Yes  Transfer 1  Transfer From 1: Sit to, Stand to  Transfer to 1: Sit  Transfer Device 1: Walker  Transfer Level of Assistance 1: Contact guard  Trials/Comments 1: From EOB    Ambulation/Gait Training  Ambulation/Gait Training Performed: Yes  Ambulation/Gait Training 1  Surface 1: Level  tile  Device 1: Rolling walker  Assistance 1: Contact guard  Quality of Gait 1: Decreased step length  Comments/Distance (ft) 1: 3 ft to recliner, no LOB  Extremity/Trunk Assessments:  RUE   RUE : Within Functional Limits  LUE   LUE: Within Functional Limits  RLE   RLE : Within Functional Limits  LLE   LLE : Within Functional Limits  Treatments:  Therapeutic Activity  Therapeutic Activity Performed: Yes  Therapeutic Activity 1: Education: POC, importance of mobility, log roll technique    Bed Mobility  Bed Mobility: Yes  Bed Mobility 1  Bed Mobility 1: Supine to sitting, Log roll  Level of Assistance 1: Close supervision  Bed Mobility Comments 1: Cues to roll onto side    Ambulation/Gait Training  Ambulation/Gait Training Performed: Yes  Ambulation/Gait Training 1  Surface 1: Level tile  Device 1: Rolling walker  Assistance 1: Contact guard  Quality of Gait 1: Decreased step length  Comments/Distance (ft) 1: 3 ft to recliner, no LOB  Transfers  Transfer: Yes  Transfer 1  Transfer From 1: Sit to, Stand to  Transfer to 1: Sit  Transfer Device 1: Walker  Transfer Level of Assistance 1: Contact guard  Trials/Comments 1: From EOB  Outcome Measures:  [facility] Basic Mobility  Turning from your back to your side while in a flat bed without using bedrails: None  Moving from lying on your back to sitting on the side of a flat bed without using bedrails: A little  Moving to and from bed to chair (including a wheelchair): A little  Standing up from a chair using your arms (e.g. wheelchair or bedside chair): A little  To walk in hospital room: A little  Climbing 3-5 steps with railing: A little  Basic Mobility - Total Score: 19    Encounter Problems       Encounter Problems (Active)       Mobility       Patient will be able to ambulate 300ft independently with LRAD in order to navigate their community safely.   (Progressing)       Start:  07/18/25            Patient will be able to ascend/descend 6 stairs with CGA or less in order  to safely enter their home.   (Progressing)       Start:  07/18/25               PT Transfers       Patient will be able to complete sit to stand transfer to [facility] independently.  (Progressing)       Start:  07/18/25                   Education Documentation  Body Mechanics, taught by Chika Fitzpatrick PT at 7/18/2025 11:18 AM.  Learner: Patient  Readiness: Acceptance  Method: Explanation  Response: Verbalizes Understanding  Comment: Pt educated on POC, importance of mobility, AD usage    Mobility Training, taught by Chika Fitzpatrick PT at 7/18/2025 11:18 AM.  Learner: Patient  Readiness: Acceptance  Method: Explanation  Response: Verbalizes Understanding  Comment: Pt educated on POC, importance of mobility, AD usage    Education Comments  No comments found.

## 2025-07-18 NOTE — OP NOTE
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]     Procedures  LARYNGOSCOPY  65864 - MT LARYNGOSCOPY W/WO TRACHEOSCOPY DX EXCEPT     BRONCHOSCOPY  25352 - MT BRNCHSC INCL FLUOR GDNCE DX W/CELL WASHG SPX    ESOPHAGOSCOPY  90429 - MT ESOPHAGOSCOPY FLEXIBLE TRANSORAL DIAGNOSTIC    PEG TUBE INSERTION  65271 - MT EGD PERCUTANEOUS PLACEMENT GASTROSTOMY TUBE    TRACHEOSTOMY  94385 - MT TRACHEOSTOMY PLANNED SEPARATE PROCEDURE    MANDIBULECTOMY  00119 - MT GLSSC COMPOSIT W/RESCJ FLOOR & MANDIBULAR RESCJ    LEFT COMPOSITE RESECTION, LEFT NECK EXPLORATION FOR VESSELS  03360 - MT CERVICAL LYMPHADEC MODIFIED RADICAL NECK DSJ    SKIN GRAFT  13331 - MT SPLT AGRFT F/S/N/H/F/G/M/DGT 1ST 100 SQCM/</1%    MANDIBLE RECONSTRUCTION  55732 - MT RCNSTJ MNDBL/MAXL SUBPRIOSTEAL IMPLANT PARTIAL    FREE FLAP  33360 - MT FREE OSTQ FLAP W/MVASC ANAST METAR/GREAT TOE      Surgeons   Panel 1:     * Fili Chacon - Primary  Panel 2:     * Jamia Thomas - Primary    Resident/Fellow/Other Assistant:  Surgeons and Role:  Panel 1:     * Angel Cruz DO - Resident - Assisting     * Aisha Washington MD - Resident - Assisting     * Aisha Sharp PA-C - PHYLICIA First Assist  Panel 2:     * Tee Narayanan DO - Resident - Assisting  Alyse Mir MD- Fellow     Staff:   Circulator: Kiki  Circulator: Akira Haynesub Person: Deloris  Scrub Person: Kely Turner Scrub: Hailee  Relief Scrub: Akira  Relief Circulator: Hailee  Relief Circulator: Gisella  Relief Circulator: Brianna    Anesthesia Staff: Anesthesiologist: Darian Green DO  Anesthesia Resident:  Juan Arreguin MD    Procedure Summary  Anesthesia: General  ASA: IV  Estimated Blood Loss: 200mL  Intra-op Medications:   Administrations occurring from 0645 to 1535 on 07/17/25:   Medication Name Total Dose   sodium chloride 0.9 % irrigation solution 2,000 mL   surgical lubricant gel 1 Application   sterile water irrigation solution 1,000 mL   lidocaine (Xylocaine) 20 mg/mL (2 %) injection 50 mL   papaverine injection 60 mg   heparin (porcine) 25,000 Units in sodium chloride 0.9 % 250 mL irrigation 50,000 Units   lidocaine-epinephrine (Xylocaine W/EPI) 1 %-1:100,000 injection 20 mL   mineral oil, light topical 20 mL   acetaminophen (Tylenol) tablet 975 mg   albumin human bottle 5% 500 mL   ampicillin-sulbactam (Unasyn) 3 g 6 g   dexAMETHasone (Decadron) 4 mg/mL 10 mg   fentaNYL (Sublimaze) injection 50 mcg/mL 50 mcg   gabapentin (Neurontin) capsule 600 mg   LR bolus Cannot be calculated   lidocaine PF (cardiac) syringe 2% 100 mg   phenylephrine (Andrea-Synephrine) 10 mg/250 mL NS (40 mcg/mL) infusion 15.23 mg   phenylephrine (Andrea-Synephrine) injection 800 mcg   propofol (Diprivan) injection 10 mg/mL 100 mg   remifentanil (Ultiva) 1,000 mcg in sodium chloride 0.9% 50 mL (20 mcg/mL) infusion 1.54 mg   remifentanil (Ultiva) injection 240 mcg   rocuronium 10 mg/mL 50 mg              Anesthesia Record               Intraprocedure I/O Totals          Intake    LR bolus 1250.00 mL    Remifentanil Drip 0.00 mL    The total shown is the total volume documented since Anesthesia Start was filed.    Phenylephrine Drip 0.00 mL    The total shown is the total volume documented since Anesthesia Start was filed.    Total Intake 1250 mL       Output    Urine 200 mL    Total Output 200 mL       Net    Net Volume 1050 mL          Specimen:   ID Type Source Tests Collected by Time   1 : LEFT POSTERIOR GUM, MARGIN MANDIBLE - STITCH ANTERIOR Tissue SOFT TISSUE RESECTION SURGICAL PATHOLOGY EXAM Fili Chacon MD 7/17/2025 0975   2 :  MANDIBULAR MARROW TO RULE OUT CANCER Tissue SOFT TISSUE RESECTION SURGICAL PATHOLOGY EXAM Fili Chacon MD 7/17/2025 0931   3 : LEFT LEVEL 1B Tissue SOFT TISSUE RESECTION SURGICAL PATHOLOGY EXAM Fili Chacon MD 7/17/2025 0944                 Drains and/or Catheters:   Closed/Suction Drain 3 Anterior;Inferior;Left Elbow Bulb 10 Fr. (Active)   Site Description Healing 07/18/25 0900   Dressing Status Other (Comment) 07/18/25 0900   Output (mL) 10 mL 07/18/25 0831       Open Drain 1 Anterior;Left Neck 0.5 cm (Active)   Site Description Bleeding 07/18/25 0900   Dressing Status Other (Comment) 07/18/25 0900       Open Drain 2 Anterior;Lateral Neck 0.25 cm (Active)   Site Description Bleeding 07/18/25 0900   Dressing Status Other (Comment) 07/18/25 0900       Gastrostomy/Enterostomy Percutaneous endoscopic gastrostomy (PEG) 4 20 Fr. LUQ (Active)   Surrounding Skin Intact 07/18/25 1023   Drain Status Clamped 07/18/25 1023   Site Description Healing 07/18/25 1023   Dressing Type Open to air 07/18/25 1023   Dressing Status Other (Comment) 07/18/25 0900   Tube Feeding Frequency Continuous 07/18/25 1023   Tube Feeding Pivot 1.5 07/18/25 1023   Tube Feeding Strength Full strength 07/18/25 1023   Tube Feeding Method Continuous per pump 07/18/25 1023   Tube Feeding Rate (ml/hr) 10 mL/hr 07/18/25 1023   Feeding Tube Flushed With Tap water 07/18/25 1023       Urethral Catheter Non-latex 16 Fr. (Active)   Site Assessment Clean;Skin intact 07/18/25 0915   Reason for Continuing Urinary Catheterization surgical procedures: urological/gynecological, pelvic oncology, anal, prolonged surgical procedure 07/17/25 2103   Output (mL) 100 mL 07/18/25 0831       Tourniquet Times:     Total Tourniquet Time Documented:  Arm - Upper (Left) - 76 minutes  Total: Arm - Upper (Left) - 76 minutes      Implants:  Implants       Type Name Action Serial No.      Implant DEVICE, ANASTOMATIC 4.0MM ORANGE - [id number] Implanted               Findings:  4  cm x 5 cm mucosal defect in the left posterior oral cavity with marginal mandibulectomy  5 x 6 cm skin paddle planned left radial forearm  Left RFFF harvested with cephalic vein connector to Vcs  Radial artery to left facial artery with 9.0 interrupted suture. One rescue stitch  Flap vein to left EJ with 4.0   Internal doppler on artery  Vestibuloplasty left posterior oral cavity  3 penrose drains left neck  5 x 6 STSG left thigh  10 flat CAMMY drain in the left arm    Indications: Shola Carbajal is an 80 y.o. 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     The patient was seen in the preoperative area. The risks, benefits, complications, treatment options, non-operative alternatives, expected recovery and outcomes were discussed with the patient. The possibilities of reaction to medication, pulmonary aspiration, injury to surrounding structures, bleeding, recurrent infection, the need for additional procedures, failure to diagnose a condition, and creating a complication requiring transfusion or operation were discussed with the patient. The patient concurred with the proposed plan, giving informed consent.  The site of surgery was properly noted/marked if necessary per policy. The patient has been actively warmed in preoperative area. Preoperative antibiotics have been ordered and given within 1 hours of incision. Venous thrombosis prophylaxis have been ordered including bilateral sequential compression devices    Procedure Details: I came into the procedure after Dr. Chacon had already performed the resection. The defect was 4 cm by 5 cm in size as described in findings above.    I then moved down to the left arm. An esmarch was used to exsanguinate the arm and the tourniquet was inflated to 250 mm Hg. I marked out a 5 cm x 6 cm cm flap starting 2 cm proximal to the thenar eminence. The flap was centered over the palpable radial artery. It was lateral enough to  include the cephalic venous connector. The incision was made circumferentially, and extended proximally into the antebrachial fossa. I began on the radial side by dissecting through the subcutaneous fat down to the brachialradialis muscle and tendon. I elevated the skin off the underlying tendon and muscle suprafascially, leaving plenty of paratenon. The cephalic vein was included with the flap while the superficial radial nerve was identified and traced along its course to be preserved in it's entirety.     I then began on the ulnar side, dissecting down over to the muscle fascia. The skin was elevated suprafascially until the flexor carpi radialis tendon was reached. Again paratenon was left on the tendon, and then the dissection transitioned into a subfascial plane after reaching the lateral edge of the tendon. The pedicle and its skin perforators were identified.     At this point, I elevated proximally to expose the cephalic vein running in an ulnar direction. This was traced to an adequate distance and size into the proximal forearm. The pedicle was then traced in a similar fashion into the antebrachial fossa, taking care to clip small branches to surrounding muscle. The artery and VCs were then . The vena were noted to be connected to the cephalic vein via a connecting branch. The tourniquet was then released and hemostasis was achieved on the flap and in the arm.      The flap was then transferred up to the head for microvascular anastomosis and inset. The flap was inserted into the left oral cavity defect and tacked to the gingiva. The vessels were dropped down into the neck with good geometry.    The recipient facial artery was identified. Some small venous contributions were found and clipped. The adventitia was cleaned off carefully and the lumen was irrigated with heparin saline. A V3 clamp was applied and the artery was noted to have excellent pulsatile flow. The recipient external jugular  vein was identified that seemed to be a good match for our cephalic vein. A V3 clamp was applied to the base as well, there was good back flow. This was sized to fit a 4.0 . The flap artery and vein were similarly trimmed and cleaned. The donor artery was irrigated with heparin saline and noted to have excellent flow through the flap, this was irrigated until it flowed clear.     Next a 3A double acland clamp was brought into the field and the donor artery and recipient artery placed into each clamp. The arteries were anastomosed with 9.0 nylon interrupted suture. Next, the donor vein and recipient vein were brought together. The 4-0  was used to attach the two veins. Papaverin was instilled into the arteries. The arterial clamp was released with good flow through the anastomosis and filling of the vein. The vein clamp was released. An additional stitch was needed in the artery anastomosis. The vessel geometry was arranged so that there would not be any  kinking of the vein. An implantable doppler was placed on the artery.    Next attention was turned to the inset. 3-0 vicryl horizontal mattress sutures were used to inset the flap circumferentially. The flap fit well into the posterior oral cavity and vestibuloplasty done without complication.     The neck was irrigated, inspected and hemostasis achieved. Three penrose drains were placed, one in the left gutter, one in the left submandibular space, and another in the submental space. The incision was closed in layers with 3.0 vicryl suture and 5.0 fast.    The left forearm was irrigated, inspected and hemostasis achieved. A 10 flat CAMMY drain was placed and sutured to the skin. The incision was closed in layers with 4.0 vicryl and 5.0 fast gut suture. Muscle belly fascia was placed over the nerve and tendon for additional protection. The left leg was cleaned and a 5 cm x 6 cm split thickness skin graft taken with the dermatome set at 0.0016 in. An  epinephrine pledge was placed over the leg. The skin graft was taken to the arm and sutured to the donor site defect with 4.0 monocryl. Pie crusting was made in the skin graft. A xeroform and sponge bolster was made and sutured over the skin graft with 2.0 silk. A cast was fashioned out of casting material, kerlix and ace to keep the arm in a flexed position.     The patient was returned to anesthesia for awakening. There were no immediate complications.  Evidence of Infection: No   Complications:  None; patient tolerated the procedure well.    Disposition: PACU - hemodynamically stable.  Condition: stable       The fellow was involved in the critical parts of the advanced portions of this procedure which include the harvest of the free flap, critical portions working under and using  the operating microscope for the microvascular anastomosis, insetting of the flap including for the pharyngoesophageal reconstruction and mouth reconstruction as there was  no qualified resident of suitable training available for these portions of the procedure.  Resident involvement in other portions of the procedure going on simultaneously include closure of the donor site as well as obtaining skin graft for closure.    Alyse Mir MD- Fellow     ATTESTATION: I was present and scrubbed for the key portions of the procedure.  Jamia Thomas MD

## 2025-07-18 NOTE — CONSULTS
"Nutrition Initial Assessment:   Nutrition Assessment    Reason for Assessment: Initiate and manage tube feeding    Patient is a 80 y.o. 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     PMH:  HTN, CAD, stented coronary artery, MI, CHF, AICD (automatic cardioverter/defibrillator) present, COPD, GI bleed, hypothyroidism, peripheral vascular disease, JAE, GERD, pulmonary nodules    Nutrition History:  Food and Nutrient History: Attempt to meet with pt x3 this AM, however, pt with other providers at attempted visits. Limited information available via chart review. Pt with PEG in place, trickle feeds of Pivot 1.5 @ 10mL/hr started today 07/18.  Food Allergy:  (NKFA per chart)       Anthropometrics:  Height: 177.8 cm (5' 10\")   Weight: 76.9 kg (169 lb 8.5 oz)   BMI (Calculated): 24.33  IBW/kg (Dietitian Calculated): 75.5 kg  Percent of IBW: 102 %    Weight History:   Wt Readings from Last 10 Encounters:   07/17/25 76.9 kg (169 lb 8.5 oz)   07/10/25 76.2 kg (168 lb)   07/01/25 76.7 kg (169 lb)   06/20/25 75.9 kg (167 lb 4.8 oz)       Weight Change %:  Weight History / % Weight Change: Limited weight history availble, weight appears stable x the last 1 month.  Significant Weight Loss: No    Nutrition Focused Physical Exam Findings:    Subcutaneous Fat Loss:   Defer Subcutaneous Fat Loss Assessment: Defer all  Defer All Reason: pt unavailable x3 attempts  Muscle Wasting:  Defer Muscle Wasting Assessment: Defer all  Defer All Reason: pt unavailable x3 attempts  Edema:  Edema: none  Physical Findings:  Skin: Positive (surgical wounds to neck and L arm)    Nutrition Significant Labs:  CBC Trend:   Results from last 7 days   Lab Units 07/18/25  0619 07/17/25  2213   WBC AUTO x10*3/uL 14.1* 16.9*   RBC AUTO x10*6/uL 2.82* 2.98*   HEMOGLOBIN g/dL 8.9* 9.6*   HEMATOCRIT % 27.5* 30.1*   MCV fL 98 101*   PLATELETS AUTO x10*3/uL 148* 148* " "   , BMP Trend:   Results from last 7 days   Lab Units 07/18/25  0619 07/17/25  2213   GLUCOSE mg/dL 105* 119*   CALCIUM mg/dL 9.1 8.7   SODIUM mmol/L 140 140   POTASSIUM mmol/L 4.6 4.4   CO2 mmol/L 26 25   CHLORIDE mmol/L 105 104   BUN mg/dL 28* 27*   CREATININE mg/dL 1.43* 1.44*    , Renal Lab Trend:   Results from last 7 days   Lab Units 07/18/25  0619 07/17/25  2213   POTASSIUM mmol/L 4.6 4.4   PHOSPHORUS mg/dL 3.1 3.3   SODIUM mmol/L 140 140   MAGNESIUM mg/dL 1.95 1.86   EGFR mL/min/1.73m*2 50* 50*   BUN mg/dL 28* 27*   CREATININE mg/dL 1.43* 1.44*    , Vit D: No results found for: \"VITD25\"     Nutrition Specific Medications:  Scheduled medications  ampicillin-sulbactam, 3 g, intravenous, q6h  aspirin, 325 mg, g-tube, Daily  atorvastatin, 40 mg, g-tube, Nightly  cholecalciferol, 50 mcg, g-tube, Daily  enoxaparin, 40 mg, subcutaneous, q24h  [Held by provider] furosemide, 60 mg, g-tube, Daily  insulin lispro, 0-5 Units, subcutaneous, q6h  metoprolol tartrate, 6.25 mg, g-tube, BID  pantoprazole, 40 mg, intravenous, Daily  polyethylene glycol, 17 g, oral, Daily  [Held by provider] sacubitriL-valsartan, 1 tablet, oral, BID  [Held by provider] spironolactone, 12.5 mg, g-tube, Daily    PRN medications: dextrose, dextrose, glucagon, glucagon, naloxone, naloxone, ondansetron **OR** ondansetron, oxyCODONE, oxyCODONE    I/O:    ;      Dietary Orders (From admission, onward)       Start     Ordered    07/18/25 0829  Enteral feeding with NPO Pivot 1.5; GT (gastric tube); 10  Diet effective now        Question Answer Comment   Tube feeding formula: Pivot 1.5    Feeding route: GT (gastric tube)    Tube feeding continuous rate (mL/hr): 10        07/18/25 0829 07/17/25 2128  May Participate in Room Service  ( ROOM SERVICE MAY PARTICIPATE)  Once        Question:  .  Answer:  Yes    07/17/25 2127                     Estimated Needs:   Total Energy Estimated Needs in 24 hours (kCal): 2150 kCal  Method for Estimating Needs: " 28kcal/kg x ABW  Total Protein Estimated Needs in 24 Hours (g):  (100-115)  Method for Estimating 24 Hour Protein Needs: 1.3-1.5g/kg x ABW  Method for Estimating 24 Hour Fluid Needs: 1mL/kcal or per team        Nutrition Diagnosis   Malnutrition Diagnosis  Patient has Malnutrition Diagnosis:  (unable to determine d/t limited information at this time)    Nutrition Diagnosis  Patient has Nutrition Diagnosis: Yes  Diagnosis Status (1): New  Nutrition Diagnosis 1: Increased nutrient needs  Related to (1): increased metabolic demand  As Evidenced by (1): cancer diagnosis, now post-op  Additional Assessment Information (1): Unable to fully assess nutrition status at this time d/t lack of nutrition history, limited weight history, and unable to complete NFPE.       Nutrition Interventions/Recommendations   Nutrition prescription for enteral nutrition    Nutrition Recommendations:    1. When appropriate, start Pivot 1.5 @ 10mL/hr. Advance by 10mL/hr q6-8hrs to goal rate of 60 mL/hr   -FWF: 270mL 4x/day or per MD*  -Monitor RFP + Mag for s/s refeeding; hold TF and replete lytes as indicated prior to advancing   -This regimen provides 1440mL, 2160kcal, 135g protein, 2160mL H2O   (1080mL H2O from TF)    2. After pt tolerates continuous goal feeds, transition to bolus feeds:   -Pivot 1.5 @ 360mL 4x/day   -FWF: 60mL pre/post feed + additional 300mL BID or per MD*    3. Prior to discharge, transition to standard, fiber-containing formula:   -Isosource 1.5 @ 360mL 4x/day (~6 cartons daily)   -FWF: 60mL pre/post feed + additional 290mL BID or per MD*   -This regimen provides 1440mL, 2160kcal, 98g protein, 2160mL H2O   (1100mL H2O from TF)    4. Please obtain weight measures at least once weekly, standing preferred, if feasible    >>>Enteral nutrition via PEG will be pt's primary source of nutrition and must be adequate to maintain pt's weight and nutrition status.    *Considering pt's cardiac conditions, water flushes may need  adjusted per med team to better meet needs.     Nutrition Interventions/Goals:   Enteral Intake: Management of delivery rate of enteral nutrition  Goal: Pivot 1.5 @ 60mL/hr       Nutrition Monitoring and Evaluation   Enteral and Parenteral Nutrition Intake Determination: Enteral nutrition intake - Tolerate TF at goal rate, Enteral nutrition intake - To meet > 75% estimated energy needs    Body Weight: Body weight - Maintain stable weight    Electrolyte and Renal Panel: Electrolytes within normal limits         Goal Status: New goal(s) identified    Time Spent (min): 60 minutes

## 2025-07-18 NOTE — CARE PLAN
The patient's goals for the shift include  rest, OOB to chair.    The clinical goals for the shift include VS and flap to remain stable    Pt worked with PT, sat up in chair for 2 hrs during shift.

## 2025-07-18 NOTE — SIGNIFICANT EVENT
"ENT Flap Check  Shola Carbajal is a 80 y.o. male who on 7/17/2025 underwent PEG, left oral cavity composite resection, left ND level 1B, left radial forearm free flap with Nan Chacon and William      Physical Examination\"  Vitals: /71   Pulse 107   Temp 37.4 °C (99.3 °F) (Temporal)   Resp 16   Ht 1.778 m (5' 10\")   Wt 76.9 kg (169 lb 8.5 oz)   SpO2 97%   BMI 24.33 kg/m²   Gen: NAD, AOx3, resting comfortably in bed  Face/Neck: All incisions c/d/i, neck soft and flat without evidence of hematoma or fluid collection, penroses (x3) with appropriate output after milking  -External doppler with strong biphasic arterial signal   Oral Cavity: All intraoral incisions c/d/i without evidence of dehiscence  -Flap soft and pink with good capillary refill, stable lateral area of bruising as previously seen in PACU  Extremities: Left arm warm with intact movement and sensation  - thigh skin graft donor site with tegaderm in place  Abdomen: PEG in place  Drains: Penroses x3 - milked, Arm drain in place and holding suction with serosanguinous drainage (stripped)    Assessment:  Shola Carbajal is a 80 y.o. male who underwent PEG, left oral cavity composite resection, left ND level 1B, left radial forearm free flap on 7/17/2025. Flap check  stable from initial evaluation in PACU.    Plan:  -Flap Checks: q6hrs - will additionally evaluate at 10 PM and 2 AM  -Drains: Monitor output  -Analgesia: PCA  -FEN: mIVFs, NPO, Tfs PEG tube to gravity  -Cardio: Cardiology team following due to heart failure history, plan for formal echo 7/18  -ID: Unasyn x24 hrs    Giovanny Davis MD  [id number]    "

## 2025-07-18 NOTE — SIGNIFICANT EVENT
"ENT Flap Check  Shola Carbajal is a 80 y.o. male who on 7/17/2025 underwent PEG, left oral cavity composite resection, left ND level 1B, left radial forearm free flap with Nan Chacon and William    Subjective  Pain is well-controlled with patient not requiring PCA.  Patient states his heart rate is in the 90s with BPs in the 90s/60s at home.  At this time patient does not want to take any meds through the PEG tube.    Physical Examination\"  Vitals: BP 97/58   Pulse 105   Temp 37.4 °C (99.3 °F) (Temporal)   Resp 16   Ht 1.778 m (5' 10\")   Wt 76.9 kg (169 lb 8.5 oz)   SpO2 94%   BMI 24.33 kg/m²   Gen: NAD, AOx3, resting comfortably in bed  Face/Neck: All incisions c/d/i, neck soft and flat without evidence of hematoma or fluid collection, penroses (x3) with appropriate output after milking  -External doppler with strong biphasic arterial signal   Oral Cavity: All intraoral incisions c/d/i without evidence of dehiscence  -Flap soft and pink with good capillary refill, stable lateral area of bruising as previously seen in PACU  Extremities: Left arm warm with intact movement and sensation  - thigh skin graft donor site with tegaderm in place  Abdomen: PEG in place  Drains: Penroses x3 - milked, Arm drain in place and holding suction with serosanguinous drainage (stripped)    Assessment:  Shola Carbajal is a 80 y.o. male who underwent PEG, left oral cavity composite resection, left ND level 1B, left radial forearm free flap on 7/17/2025. Flap check  stable from initial evaluation in PACU.    Plan:  -Flap Checks: q6hrs  -Drains: Monitor output  -Analgesia: PCA  -FEN: mIVFs, NPO, Tfs PEG tube to gravity  -Cardio: Cardiology team following due to heart failure history, plan for formal echo 7/18  -ID: Unasyn x24 hrs    Giovanny Davis MD  [id number]    "

## 2025-07-18 NOTE — SIGNIFICANT EVENT
Arrived with family complaining of right knee pain that stated 3 days ago. Pt has history of arthritis    ENT Flap Check      Subjective:  Patient doing well. Resting in bed. His pain is controlled and tolerating tube feeds.    Objective:  Vitals reviewed in EMR  Gen: NAD, AOx3, resting comfortably in bed  Face/Neck: All incisions c/d/i without evidence of dehiscence  -penroses (x3) with appropriate output after milking  -External doppler with strong biphasic arterial signal  Oral Cavity: All intraoral incisions c/d/i without evidence of dehiscence  -Flap soft and pink with good capillary refill, lateral area of bruising is stable from mid-day check  Extremities: Left arm warm with intact movement and sensation  -Left thigh skin graft donor site with tegaderm in place  Abdomen: PEG in place  Drains: All drains in place and holding suction with serosanguinous drainage (stripped)    Assessment/Plan:  Shola Carbajal is a 80 y.o. male who underwent PEG, left oral cavity composite resection, left ND level 1B, left radial forearm free flap on 7/17/2025. Flap check with increasing spread of lateral bruising compared to what was seen on morning rounds - stable now. Senior resident made aware.       -Flap Checks: q6hrs  -Drains: Monitor output  -Analgesia: PCA  -FEN: mIVFs, NPO, Tfs PEG tube to gravity  -Cardio: Cardiology team following due to heart failure history, plan for formal echo 7/18      Lux Daly MD

## 2025-07-18 NOTE — PROGRESS NOTES
"  Otolaryngology - Head and Neck Surgery Progress Note             Shola Carbajal is a 80 y.o. male on day 1 of admission presenting with Cancer of oral cavity (Multi). No acute events overnight. Flap checks stable.    Subjective   Patient resting in bed this morning on rounds; pain controlled.      Objective   Vitals reviewed in EMR  Gen: NAD, AOx3, resting comfortably in bed  Eyes: EOMI, sclera clear, PERRL  Ears: Normal external ears bilaterally  Nose: No rhinorrhea; anterior nares clear  Oral Cavity: All intraoral incisions c/d/i without evidence of dehiscence  -Flap soft and pink with good capillary refill, stable lateral area of bruising  Head: normocephalic, atraumatic  Face/Neck:  All incisions c/d/i, neck soft and flat without evidence of hematoma or fluid collection, penroses (x3) with appropriate output after milking  -External doppler with strong biphasic arterial signal  Resp: Non-labored breathing on 1L/min via nasal cannula  Cards: Irregular on Doppler  Gastro: Soft, non-distended,  PEG tube in place   : Dai catheter in place clear yellow urine  MSK: Left arm warm with intact movement and sensation  - thigh skin graft donor site with tegaderm in place  -moves all extremities  Psych: Appropriate mood and affect  Drains: All drains in place and holding suction with serosanguinous drainage (stripped)    Last Recorded Vitals  Blood pressure 106/65, pulse (!) 123, temperature 37.2 °C (99 °F), temperature source Temporal, resp. rate 16, height 1.778 m (5' 10\"), weight 76.9 kg (169 lb 8.5 oz), SpO2 94%.  Intake/Output last 3 Shifts:  I/O last 3 completed shifts:  In: 1625 (21.1 mL/kg) [I.V.:375 (4.9 mL/kg); IV Piggyback:1250]  Out: 657 (8.5 mL/kg) [Urine:625 (0.2 mL/kg/hr); Drains:32]  Weight: 76.9 kg     Relevant Results  Results for orders placed or performed during the hospital encounter of 07/17/25 (from the past 24 hours)   Blood Gas Arterial Full Panel   Result Value Ref Range    POCT pH, Arterial " 7.30 (L) 7.38 - 7.42 pH    POCT pCO2, Arterial 45 (H) 38 - 42 mm Hg    POCT pO2, Arterial 103 (H) 85 - 95 mm Hg    POCT SO2, Arterial 100 94 - 100 %    POCT Oxy Hemoglobin, Arterial 94.6 94.0 - 98.0 %    POCT Hematocrit Calculated, Arterial 33.0 (L) 41.0 - 52.0 %    POCT Sodium, Arterial 136 136 - 145 mmol/L    POCT Potassium, Arterial 3.6 3.5 - 5.3 mmol/L    POCT Chloride, Arterial 109 (H) 98 - 107 mmol/L    POCT Ionized Calcium, Arterial 1.12 1.10 - 1.33 mmol/L    POCT Glucose, Arterial 85 74 - 99 mg/dL    POCT Lactate, Arterial 0.5 0.4 - 2.0 mmol/L    POCT Base Excess, Arterial -4.3 (L) -2.0 - 3.0 mmol/L    POCT HCO3 Calculated, Arterial 22.1 22.0 - 26.0 mmol/L    POCT Hemoglobin, Arterial 10.9 (L) 13.5 - 17.5 g/dL    POCT Anion Gap, Arterial 9 (L) 10 - 25 mmo/L    Patient Temperature 37.0 degrees Celsius    FiO2 40 %   Blood Gas Arterial Full Panel   Result Value Ref Range    POCT pH, Arterial 7.29 (L) 7.38 - 7.42 pH    POCT pCO2, Arterial 51 (H) 38 - 42 mm Hg    POCT pO2, Arterial 92 85 - 95 mm Hg    POCT SO2, Arterial 98 94 - 100 %    POCT Oxy Hemoglobin, Arterial 93.8 (L) 94.0 - 98.0 %    POCT Hematocrit Calculated, Arterial 42.0 41.0 - 52.0 %    POCT Sodium, Arterial 135 (L) 136 - 145 mmol/L    POCT Potassium, Arterial 4.1 3.5 - 5.3 mmol/L    POCT Chloride, Arterial 103 98 - 107 mmol/L    POCT Ionized Calcium, Arterial 1.25 1.10 - 1.33 mmol/L    POCT Glucose, Arterial 116 (H) 74 - 99 mg/dL    POCT Lactate, Arterial 0.6 0.4 - 2.0 mmol/L    POCT Base Excess, Arterial -2.7 (L) -2.0 - 3.0 mmol/L    POCT HCO3 Calculated, Arterial 24.5 22.0 - 26.0 mmol/L    POCT Hemoglobin, Arterial 13.9 13.5 - 17.5 g/dL    POCT Anion Gap, Arterial 12 10 - 25 mmo/L    Patient Temperature 37.0 degrees Celsius    FiO2 40 %   Blood Gas Arterial Full Panel   Result Value Ref Range    POCT pH, Arterial 7.33 (L) 7.38 - 7.42 pH    POCT pCO2, Arterial 44 (H) 38 - 42 mm Hg    POCT pO2, Arterial 109 (H) 85 - 95 mm Hg    POCT SO2, Arterial  99 94 - 100 %    POCT Oxy Hemoglobin, Arterial 95.3 94.0 - 98.0 %    POCT Hematocrit Calculated, Arterial 36.0 (L) 41.0 - 52.0 %    POCT Sodium, Arterial 136 136 - 145 mmol/L    POCT Potassium, Arterial 4.3 3.5 - 5.3 mmol/L    POCT Chloride, Arterial 107 98 - 107 mmol/L    POCT Ionized Calcium, Arterial 1.20 1.10 - 1.33 mmol/L    POCT Glucose, Arterial 128 (H) 74 - 99 mg/dL    POCT Lactate, Arterial 0.8 0.4 - 2.0 mmol/L    POCT Base Excess, Arterial -2.8 (L) -2.0 - 3.0 mmol/L    POCT HCO3 Calculated, Arterial 23.2 22.0 - 26.0 mmol/L    POCT Hemoglobin, Arterial 12.1 (L) 13.5 - 17.5 g/dL    POCT Anion Gap, Arterial 10 10 - 25 mmo/L    Patient Temperature 37.0 degrees Celsius    FiO2 40 %   POCT GLUCOSE   Result Value Ref Range    POCT Glucose 126 (H) 74 - 99 mg/dL   Renal Function Panel   Result Value Ref Range    Glucose 119 (H) 74 - 99 mg/dL    Sodium 140 136 - 145 mmol/L    Potassium 4.4 3.5 - 5.3 mmol/L    Chloride 104 98 - 107 mmol/L    Bicarbonate 25 21 - 32 mmol/L    Anion Gap 15 10 - 20 mmol/L    Urea Nitrogen 27 (H) 6 - 23 mg/dL    Creatinine 1.44 (H) 0.50 - 1.30 mg/dL    eGFR 50 (L) >60 mL/min/1.73m*2    Calcium 8.7 8.6 - 10.6 mg/dL    Phosphorus 3.3 2.5 - 4.9 mg/dL    Albumin 3.9 3.4 - 5.0 g/dL   CBC   Result Value Ref Range    WBC 16.9 (H) 4.4 - 11.3 x10*3/uL    nRBC 0.0 0.0 - 0.0 /100 WBCs    RBC 2.98 (L) 4.50 - 5.90 x10*6/uL    Hemoglobin 9.6 (L) 13.5 - 17.5 g/dL    Hematocrit 30.1 (L) 41.0 - 52.0 %     (H) 80 - 100 fL    MCH 32.2 26.0 - 34.0 pg    MCHC 31.9 (L) 32.0 - 36.0 g/dL    RDW 14.6 (H) 11.5 - 14.5 %    Platelets 148 (L) 150 - 450 x10*3/uL   Magnesium   Result Value Ref Range    Magnesium 1.86 1.60 - 2.40 mg/dL   POCT GLUCOSE   Result Value Ref Range    POCT Glucose 114 (H) 74 - 99 mg/dL   POCT GLUCOSE   Result Value Ref Range    POCT Glucose 124 (H) 74 - 99 mg/dL   Renal Function Panel   Result Value Ref Range    Glucose 105 (H) 74 - 99 mg/dL    Sodium 140 136 - 145 mmol/L    Potassium  4.6 3.5 - 5.3 mmol/L    Chloride 105 98 - 107 mmol/L    Bicarbonate 26 21 - 32 mmol/L    Anion Gap 14 10 - 20 mmol/L    Urea Nitrogen 28 (H) 6 - 23 mg/dL    Creatinine 1.43 (H) 0.50 - 1.30 mg/dL    eGFR 50 (L) >60 mL/min/1.73m*2    Calcium 9.1 8.6 - 10.6 mg/dL    Phosphorus 3.1 2.5 - 4.9 mg/dL    Albumin 3.8 3.4 - 5.0 g/dL   CBC   Result Value Ref Range    WBC 14.1 (H) 4.4 - 11.3 x10*3/uL    nRBC 0.0 0.0 - 0.0 /100 WBCs    RBC 2.82 (L) 4.50 - 5.90 x10*6/uL    Hemoglobin 8.9 (L) 13.5 - 17.5 g/dL    Hematocrit 27.5 (L) 41.0 - 52.0 %    MCV 98 80 - 100 fL    MCH 31.6 26.0 - 34.0 pg    MCHC 32.4 32.0 - 36.0 g/dL    RDW 14.6 (H) 11.5 - 14.5 %    Platelets 148 (L) 150 - 450 x10*3/uL   Magnesium   Result Value Ref Range    Magnesium 1.95 1.60 - 2.40 mg/dL      No results found.     Scheduled medications  Scheduled Medications[1]  Continuous medications  Continuous Medications[2]  PRN medications  PRN Medications[3]            This patient has a urinary catheter   Reason for the urinary catheter remaining today? perioperative use for selected surgical procedures               Assessment & Plan  Cancer of oral cavity (Multi)    HTN (hypertension)    CAD (coronary artery disease)    Stented coronary artery    MI (myocardial infarction) (Multi)    CHF (congestive heart failure)    AICD (automatic cardioverter/defibrillator) present    Chronic obstructive pulmonary disease (Multi)    GI bleed    Hypothyroidism    Peripheral vascular disease    JAE (obstructive sleep apnea)    Gastroesophageal reflux disease    Pulmonary nodules    Assessment:  Shola Carbajal is a 80 y.o. male who underwent PEG, left oral cavity composite resection, left ND level 1B, left radial forearm free flap on 7/17/2025  by Dr. Chacon and Dr. Thomas.    Active Issues:  Oral cavity SCC  Acute blood loss anemia  HTN  HLD  CAD  CHF w/ EF of 30-35%  COPD  Hypothyroidism  CKD stage IIIa  PVD  JAE  GERD  Pulmonary nodules  Tobacco dependence  PTSD  Essential  tremors  History of GI bleed 2013      Plan:  -Flap Checks:  -q6hrs per ENT resident team; nursing per free flap pathway  -Drains:   -Monitor output  -Analgesia:   -Dilaudid PCA,discontinued  -PRN oxycodone  -Scheduled Acetaminophen  -FEN:   -mIVFs,  -NPO  -TF 2 trickle rate  -Monitor and replete electrolytes as needed  -Pulm:   -wean oxygen to room air  -Incentive Spirometer 10x/hr while awake  -ID:   -Unasyn 3g Q6hrs x 4days, stop date 7/21  -Cardiac:  -Vitals per ENT free flap protocol then transition to Q4hr vitals  -Aspirin 325mg daily, Metoprolol 6.25 mg BID  -Follow up Echo  -Consult Heart Failure team  -Endo:    -ISS & POCT blood glucose testing  -GI:   -Bowel regimen, PPI,  and PRN anti-emetic  -:   -Dai catheter x 24hrs  -Steroids/Special:   -Incision and oral care per ENT free flap protocol  -Embolic PPx:  -subcutaneous Lovenox  -SCDs while in bed  -Dispo:  -[facility]  -PT/OT ordered  -Discharge planning for POD 6 home with home care vs skilled nursing facility      All plans discussed with attendings after morning rounds.    I spent 35 minutes in the professional and overall care of this patient.      Nora Ballesteros APRN, CNP  Certified Family Nurse Practitioner  Nurse Practitioner III  Department of Otolaryngology: Head & Neck Surgery  Personal Pager [phone]  ENT Team  Head and Neck Phone: [phone]             [1] acetaminophen, 975 mg, oral, q8h CLARICE   Or  acetaminophen, 1,000 mg, oral, q8h CLARICE   Or  acetaminophen, 1,000 mg, g-tube, q8h CLARICE  ampicillin-sulbactam, 3 g, intravenous, q6h  aspirin, 325 mg, g-tube, Daily  atorvastatin, 40 mg, g-tube, Nightly  bacitracin, , Topical, TID  chlorhexidine, 15 mL, Mouth/Throat, Daily  chlorhexidine, 15 mL, Mouth/Throat, TID after meals  cholecalciferol, 50 mcg, g-tube, Daily  enoxaparin, 40 mg, subcutaneous, q24h  [Held by provider] furosemide, 60 mg, g-tube, Daily  hydrogen peroxide, 1 Application, Topical, TID  insulin lispro, 0-5 Units, subcutaneous,  q6h  metoprolol tartrate, 6.25 mg, g-tube, BID  pantoprazole, 40 mg, intravenous, Daily  perflutren lipid microspheres, 0.5-10 mL of dilution, intravenous, Once in imaging  perflutren protein A microsphere, 0.5 mL, intravenous, Once in imaging  polyethylene glycol, 17 g, oral, Daily  [Held by provider] sacubitriL-valsartan, 1 tablet, oral, BID  [Held by provider] spironolactone, 12.5 mg, g-tube, Daily  sulfur hexafluoride microsphr, 2 mL, intravenous, Once in imaging  [START ON 7/19/2025] white petrolatum, 1 Application, Topical, TID    [2] lactated Ringer's, 50 mL/hr, Last Rate: 50 mL/hr (07/18/25 0851)    [3] PRN medications: albuterol, dextrose, dextrose, glucagon, glucagon, naloxone, naloxone, ondansetron **OR** ondansetron, oxyCODONE, oxyCODONE

## 2025-07-18 NOTE — SIGNIFICANT EVENT
ENT Flap Check      Subjective:  Patient doing well. Up sitting in chair. His pain is controlled and tolerating tube feeds.    Objective:  Vitals reviewed in EMR  Gen: NAD, AOx3, resting comfortably in bed  Face/Neck: All incisions c/d/i without evidence of dehiscence  -penroses (x3) with appropriate output after milking  -External doppler with strong biphasic arterial signal  Oral Cavity: All intraoral incisions c/d/i without evidence of dehiscence  -Flap soft and pink with good capillary refill, increasing lateral area of bruising for morning rounds  Extremities: Left arm warm with intact movement and sensation  -Left thigh skin graft donor site with tegaderm in place  Abdomen: PEG in place  Drains: All drains in place and holding suction with serosanguinous drainage (stripped)    Assessment/Plan:  Shola Carbajal is a 80 y.o. male who underwent PEG, left oral cavity composite resection, left ND level 1B, left radial forearm free flap on 7/17/2025. Flap check with increasing spread of lateral bruising compared to what was seen on morning rounds. Senior resident made aware.       -Flap Checks: q6hrs - will additionally evaluate at 10 PM and 2 AM  -Drains: Monitor output  -Analgesia: PCA  -FEN: mIVFs, NPO, Tfs PEG tube to gravity  -Cardio: Cardiology team following due to heart failure history, plan for formal echo 7/18      CHELE Singh-CNP

## 2025-07-18 NOTE — CARE PLAN
The patient's goals for the shift include      The clinical goals for the shift include VS stability and safety throughout the night      Problem: Heart Failure  Goal: Improved gas exchange this shift  Outcome: Progressing  Goal: Improved urinary output this shift  Outcome: Progressing  Goal: Reduction in peripheral edema within 24 hours  Outcome: Progressing  Goal: Report improvement of dyspnea/breathlessness this shift  Outcome: Progressing  Goal: Weight from fluid excess reduced over 2-3 days, then stabilize  Outcome: Progressing  Goal: Increase self care and/or family involvement in 24 hours  Outcome: Progressing     Problem: Pain - Adult  Goal: Verbalizes/displays adequate comfort level or baseline comfort level  Outcome: Progressing     Problem: Safety - Adult  Goal: Free from fall injury  Outcome: Progressing     Problem: Discharge Planning  Goal: Discharge to home or other facility with appropriate resources  Outcome: Progressing     Problem: Chronic Conditions and Co-morbidities  Goal: Patient's chronic conditions and co-morbidity symptoms are monitored and maintained or improved  Outcome: Progressing     Problem: Nutrition  Goal: Nutrient intake appropriate for maintaining nutritional needs  Outcome: Progressing

## 2025-07-18 NOTE — CONSULTS
Inpatient consult to Heart Failure  Consult performed by: Lexi Ness MD  Consult ordered by: Jamia Thomas MD  Reason for consult: Postoperative heart failure medication management.           Advanced Heart Failure Initial Consultation Note   Consulting Team: Oncology  [facility]  Reason for Consult: Postoperative heart failure medication management    Subjective   Shola Carbajal is a 80 y.o. 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  Heart failure team consulted for management of diuresis and management of medication resumption postsurgery.    Patient was originally evaluated for new cancer by his dentist after breaking a tooth while eating. He was then biopsied by an outside ENT and referred here for management. Had resection and reconstruction 7/17.      The following portions of the chart were reviewed this encounter and updated as appropriate:         Review of Systems  Otherwise, limited cardiovascular review of systems is negative.    Medical History[1]  Surgical History[2]  Social History     Socioeconomic History    Marital status:      Spouse name: Not on file    Number of children: Not on file    Years of education: Not on file    Highest education level: Not on file   Occupational History    Not on file   Tobacco Use    Smoking status: Every Day     Current packs/day: 1.00     Average packs/day: 1 pack/day for 62.5 years (62.5 ttl pk-yrs)     Types: Cigarettes     Start date: 1963     Passive exposure: Past    Smokeless tobacco: Never   Substance and Sexual Activity    Alcohol use: Yes     Alcohol/week: 2.0 standard drinks of alcohol     Types: 2 Cans of beer per week     Comment: 2 drinks/week    Drug use: Not  Currently     Types: Hashish, Marijuana    Sexual activity: Yes     Partners: Female     Birth control/protection: Male Sterilization   Other Topics Concern    Not on file   Social History Narrative    Not on file     Social Drivers of Health     Financial Resource Strain: Low Risk  (7/18/2025)    Overall Financial Resource Strain (CARDIA)     Difficulty of Paying Living Expenses: Not hard at all   Food Insecurity: No Food Insecurity (7/17/2025)    Hunger Vital Sign     Worried About Running Out of Food in the Last Year: Never true     Ran Out of Food in the Last Year: Never true   Transportation Needs: No Transportation Needs (7/18/2025)    PRAPARE - Transportation     Lack of Transportation (Medical): No     Lack of Transportation (Non-Medical): No   Physical Activity: Not on file   Stress: Not on file   Social Connections: Not on file   Intimate Partner Violence: Not At Risk (7/17/2025)    Humiliation, Afraid, Rape, and Kick questionnaire     Fear of Current or Ex-Partner: No     Emotionally Abused: No     Physically Abused: No     Sexually Abused: No   Housing Stability: Low Risk  (7/18/2025)    Housing Stability Vital Sign     Unable to Pay for Housing in the Last Year: No     Number of Times Moved in the Last Year: 0     Homeless in the Last Year: No     Family History[3]    Current Outpatient Medications   Medication Instructions    albuterol 90 mcg/actuation inhaler 2 puffs, Every 6 hours PRN    aspirin 81 mg EC tablet Take 1 tablet (81 mg) by mouth once daily.    atorvastatin (LIPITOR) 40 mg, Nightly    budesonide-glycopyr-formoterol (BREZTRI) 160-9-4.8 mcg/actuation HFA aerosol inhaler 2 puffs, 2 times daily RT    cholecalciferol (VITAMIN D-3) 50 mcg, Daily    furosemide (LASIX) 60 mg, Daily    guaiFENesin (MUCINEX) 600 mg, 2 times daily PRN    imiquimod (Aldara) 5 % cream Apply topically.    ketoconazole (NIZOral) 2 % cream Apply topically.    metoprolol succinate XL (TOPROL-XL) 12.5 mg, Daily     "omeprazole (PRILOSEC) 20 mg, Daily    risedronate (ACTONEL) 150 mg, oral, Every 30 days    sacubitriL-valsartan (Entresto) 24-26 mg tablet 1 tablet, 2 times daily    sildenafil (VIAGRA) 50 mg, oral, As needed    spironolactone (ALDACTONE) 12.5 mg, oral, Daily         Objective   Physical Exam  Vitals:    07/18/25 1515   BP: 99/60   Pulse: 106   Resp: 18   Temp: 36.6 °C (97.9 °F)   SpO2: 91%       GEN: Healthy appearing, well-developed, NAD.  CV: RRR, no m/r/g. JVP normal  LUNGS: CTAB, no w/r/c.  ABD: Soft, NT/ND, NBS, no masses or organomegaly.  SKIN: Warm, well perfused. No skin rashes or abnormal lesions.  No LE edema   MSK: Normal gait. No deformities.  EXT: No clubbing, cyanosis, or edema.  NEURO: Ambulating with no limitations. No focal deficits.    I have personally reviewed the following images and laboratory findings:    ECG: Normal sinus rhythm with PVCs         Imaging  Chest x-ray: normal     Lab Review   CMP:  Recent Labs     07/18/25  0619 07/17/25  2213 07/10/25  1050    140 138   K 4.6 4.4 4.3    104 103   CO2 26 25 28   ANIONGAP 14 15 11   BUN 28* 27* 20   CREATININE 1.43* 1.44* 1.27   EGFR 50* 50* 58*   MG 1.95 1.86 2.00     Recent Labs     07/18/25  0619 07/17/25 2213   ALBUMIN 3.8 3.9     CBC:  Recent Labs     07/18/25  0619 07/17/25 2213 07/10/25  1050   WBC 14.1* 16.9* 8.2   HGB 8.9* 9.6* 14.5   HCT 27.5* 30.1* 44.0   * 148* 202   MCV 98 101* 99     COAG: No results for input(s): \"PTT\", \"INR\", \"ARIXTRA\", \"HAUF\", \"DDIMERVTE\", \"HAPTOGLOBIN\", \"FIBRINOGEN\" in the last 08127 hours.  ABO:   Recent Labs     07/17/25  0616   ABO O     HEME/ENDO:   Recent Labs     07/10/25  1050 01/17/23  1354   TSH 1.90  --    HGBA1C  --  5.4     CARDIAC: No results for input(s): \"LDH\", \"CKMB\", \"TROPHS\", \"BNP\" in the last 08028 hours.    No lab exists for component: \"CK\", \"CKMBP\"  No results for input(s): \"CHOL\", \"LDLF\", \"LDLCALC\", \"HDL\", \"TRIG\" in the last 53525 hours.  TOX:No results for input(s): " "\"AMPHETAMINE\", \"BARBSCRNUR\", \"BENZO\", \"CANNABINOID\", \"COCAI\", \"FENTANYL\", \"OPIATE\", \"OXYCODONE\", \"PCP\" in the last 23091 hours.  MICRO: No results for input(s): \"ESR\", \"CRP\", \"PROCAL\" in the last 44750 hours.  No results found for the last 90 days.       No results found for: \"TROPHS\", \"BNP\", \"LDLF\", \"TRIG\", \"NMRTOT\"     Assessment and Plan     Shola Carbajal is a 80 y.o. 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  Heart failure team consulted for management of diuresis and management of medication resumption postsurgery.    Chronic HFrEF (NYHA Class II/AHA Stage C)  Patient presented for recent surgery.  In lieu of that spironolactone, furosemide, and Entresto were held.  No echo seen in hospital records.  Per prior notes EF is 30 to 35% most recently in 2023.  Medications patient takes at home include aspirin 81 mg, atorvastatin 40, furosemide 60 mg daily, metoprolol 25 mg, Entresto 24-26 mg, and spironolactone 12.5 mg.  Right now patient is currently on metoprolol 6.5 mg twice daily.  - Please obtain the following: Please get an echo as there is no echo on file in this institution.  - Diuresis: Patient appears to be euvolemic on exam, given recent KULWANT we will hold off on diuresing.  - GDMT   - SGLT-2i: Not on any due to history of penile rash.   - Beta-blocker: Continue with metoprolol 6.25 twice daily which is currently used because patient has a PEG and succinate cannot be crushed.   - ACE-I/ARB/ARNI: Continue to hold Entresto in the setting of KULWANT.  Can resume once resolved   - MRA: Continue to hold spironolactone in the setting of KULWANT   - Vasodilators: No vasodilators  - Inotropes: None  - RV support: None  - Mechanical support: None  - " AICD/CRT-D/Lifevest: ICD placed 2023  - Strict I/Os, Daily Na < 2g daily, fluid restriction 2 L daily    For his A-fib patient did not tolerate Plavix in the past due to GI bleed.  Unsure if he will tolerate any oral anticoagulant.    Will plan on resuming medications as patient gradually improves during his hospital stay.  Please discontinue IV fluids that is currently running, if patient needs any fluids can give free water boluses.      For any questions or concerns, feel free to reach out via [employer] chat or page at [phone].         SIGNATURE: Lexi Ness MD PATIENT NAME: Shola Carbajal   DATE/TIME: July 18, 2025 4:10 PM MRN: [id number]     This plan was discussed with Dr Aguirre, HF attending.       Lexi Ness MD    Briefly patient with history of reduced ejection fraction but most recently low normal admitted following surgery we are consulted for medication recommendations at this point he appears relatively euvolemic and is n.p.o. would hold his Entresto and diuretics continue beta-blocker converting to metoprolol tartrate as you have done apparently he does have intolerance to SGLT2 inhibitors at this point do not anticipate any major changes to his medication regimen prior to discharge      I saw and evaluated the patient. I personally obtained the key and critical portions of the history and physical exam or was physically present for key and critical portions performed by the resident/fellow. I reviewed the resident/fellow's documentation and discussed the patient with the resident/fellow. I agree with the resident/fellow's medical decision making as documented in the note.         [1]   Past Medical History:  Diagnosis Date    Cataract 2011    surgery correction 2024    Clotting disorder (Multi) 2013    bowel    Colon polyp 2012    Every time I have a colonoscopy    Congenital heart disease 2/1/2007    COPD (chronic obstructive pulmonary disease) (Multi) 1991    Dizziness 2013    from low blood  pressure    Fractures 2022    hairline fracture of femur    GERD (gastroesophageal reflux disease) 2011    GI (gastrointestinal bleed) 2013    micro holes in bowel    Hearing aid worn 2019    Heart disease 2/1/2007    Heart failure 2007    HL (hearing loss) 1968    Hypothyroidism 2020    Myocardial infarction (Multi) 2/1/2007    Personal history of other mental and behavioral disorders 1968    ptsd    PTSD (post-traumatic stress disorder) 1968    Shortness of breath 1985    Substance addiction (Multi) 1963    smoking   [2]   Past Surgical History:  Procedure Laterality Date    CARDIAC CATHETERIZATION  2021    CATARACT EXTRACTION  2023    CORONARY STENT PLACEMENT  2007    4 stents after heart attack    OTHER SURGICAL HISTORY  2023, 2024,2024    mohs surgery, squamous cell cancer removal (twice),defibrillator    UPPER GASTROINTESTINAL ENDOSCOPY  2020    VASECTOMY  1975   [3] No family history on file.

## 2025-07-18 NOTE — PROGRESS NOTES
07/18/25 1100   Discharge Planning   Living Arrangements Spouse/significant other   Support Systems Spouse/significant other;Children   Assistance Needed tbd   Type of Residence Private residence   Number of Stairs to Enter Residence 0   Number of Stairs Within Residence 1   Do you have animals or pets at home? Yes   Type of Animals or Pets dog   Who is requesting discharge planning? Provider   Home or Post Acute Services In home services   Type of Home Care Services Home nursing visits;DME or oxygen;Home PT   Expected Discharge Disposition Home H   Does the patient need discharge transport arranged? No   Financial Resource Strain   How hard is it for you to pay for the very basics like food, housing, medical care, and heating? Not hard   Housing Stability   In the last 12 months, was there a time when you were not able to pay the mortgage or rent on time? N   In the past 12 months, how many times have you moved where you were living? 0   At any time in the past 12 months, were you homeless or living in a shelter (including now)? N   Transportation Needs   In the past 12 months, has lack of transportation kept you from medical appointments or from getting medications? no   In the past 12 months, has lack of transportation kept you from meetings, work, or from getting things needed for daily living? No   Patient Choice   Provider Choice list and CMS website (https://medicare.gov/care-compare#search) for post-acute Quality and Resource Measure Data were provided and reviewed with: Patient   Patient / Family choosing to utilize agency / facility established prior to hospitalization No   Stroke Family Assessment   Stroke Family Assessment Needed No   Intensity of Service   Intensity of Service 0-30 min     Care Transitions Note    Plan per Medical/Surgical Team: underwent PEG, left oral cavity composite resection, left ND level 1B, left radial forearm free flap on 7/17/2025   Status: inpatient   Payor Source:  [facility]ic[facility] A and B, [facility]  Discharge disposition: home with home care   Expected date of discharge: 7/23/25  Barriers: none  PCP / Primary Oncologist: Stephanie Wallace MD   Preferred Pharmacy: Stephanie Wallace MD  [facility]    [facility] met with patient at bedside, introduced self and role. Demographics and insurance verifed with patient. Patient lives at home with his spouse and was independent with ADLs prior to admission. Patient denies using home care and is able to ambulate independently at baseline. He he is agreeable to TCC using his medicare insurance for home care and DME supplies. If supplies become to expensive [facility]. Will continue to follow for any discharge planning needs.   Rajni Shaikh RN TCC

## 2025-07-19 ENCOUNTER — APPOINTMENT (OUTPATIENT)
Dept: RADIOLOGY | Facility: HOSPITAL | Age: 80
DRG: 012 | End: 2025-07-19
Payer: MEDICARE

## 2025-07-19 LAB
ALBUMIN SERPL BCP-MCNC: 3.6 G/DL (ref 3.4–5)
ANION GAP SERPL CALC-SCNC: 11 MMOL/L (ref 10–20)
BNP SERPL-MCNC: 400 PG/ML (ref 0–99)
BUN SERPL-MCNC: 29 MG/DL (ref 6–23)
CALCIUM SERPL-MCNC: 9.5 MG/DL (ref 8.6–10.6)
CHLORIDE SERPL-SCNC: 105 MMOL/L (ref 98–107)
CO2 SERPL-SCNC: 28 MMOL/L (ref 21–32)
CREAT SERPL-MCNC: 1.33 MG/DL (ref 0.5–1.3)
EGFRCR SERPLBLD CKD-EPI 2021: 55 ML/MIN/1.73M*2
ERYTHROCYTE [DISTWIDTH] IN BLOOD BY AUTOMATED COUNT: 14.7 % (ref 11.5–14.5)
GLUCOSE SERPL-MCNC: 93 MG/DL (ref 74–99)
HCT VFR BLD AUTO: 26.1 % (ref 41–52)
HGB BLD-MCNC: 8.5 G/DL (ref 13.5–17.5)
MAGNESIUM SERPL-MCNC: 2.3 MG/DL (ref 1.6–2.4)
MCH RBC QN AUTO: 32 PG (ref 26–34)
MCHC RBC AUTO-ENTMCNC: 32.6 G/DL (ref 32–36)
MCV RBC AUTO: 98 FL (ref 80–100)
NRBC BLD-RTO: 0 /100 WBCS (ref 0–0)
PHOSPHATE SERPL-MCNC: 2.1 MG/DL (ref 2.5–4.9)
PLATELET # BLD AUTO: 132 X10*3/UL (ref 150–450)
POTASSIUM SERPL-SCNC: 4 MMOL/L (ref 3.5–5.3)
RBC # BLD AUTO: 2.66 X10*6/UL (ref 4.5–5.9)
SODIUM SERPL-SCNC: 140 MMOL/L (ref 136–145)
WBC # BLD AUTO: 11.2 X10*3/UL (ref 4.4–11.3)

## 2025-07-19 PROCEDURE — 2500000001 HC RX 250 WO HCPCS SELF ADMINISTERED DRUGS (ALT 637 FOR MEDICARE OP)

## 2025-07-19 PROCEDURE — 83880 ASSAY OF NATRIURETIC PEPTIDE: CPT

## 2025-07-19 PROCEDURE — 2500000004 HC RX 250 GENERAL PHARMACY W/ HCPCS (ALT 636 FOR OP/ED): Performed by: STUDENT IN AN ORGANIZED HEALTH CARE EDUCATION/TRAINING PROGRAM

## 2025-07-19 PROCEDURE — 2500000001 HC RX 250 WO HCPCS SELF ADMINISTERED DRUGS (ALT 637 FOR MEDICARE OP): Performed by: STUDENT IN AN ORGANIZED HEALTH CARE EDUCATION/TRAINING PROGRAM

## 2025-07-19 PROCEDURE — 2500000002 HC RX 250 W HCPCS SELF ADMINISTERED DRUGS (ALT 637 FOR MEDICARE OP, ALT 636 FOR OP/ED)

## 2025-07-19 PROCEDURE — 71045 X-RAY EXAM CHEST 1 VIEW: CPT

## 2025-07-19 PROCEDURE — 71045 X-RAY EXAM CHEST 1 VIEW: CPT | Performed by: STUDENT IN AN ORGANIZED HEALTH CARE EDUCATION/TRAINING PROGRAM

## 2025-07-19 PROCEDURE — 1200000003 HC ONCOLOGY  ROOM WITH TELEMETRY DAILY

## 2025-07-19 PROCEDURE — 2500000005 HC RX 250 GENERAL PHARMACY W/O HCPCS: Performed by: STUDENT IN AN ORGANIZED HEALTH CARE EDUCATION/TRAINING PROGRAM

## 2025-07-19 PROCEDURE — 36415 COLL VENOUS BLD VENIPUNCTURE: CPT | Performed by: STUDENT IN AN ORGANIZED HEALTH CARE EDUCATION/TRAINING PROGRAM

## 2025-07-19 PROCEDURE — 80069 RENAL FUNCTION PANEL: CPT | Performed by: STUDENT IN AN ORGANIZED HEALTH CARE EDUCATION/TRAINING PROGRAM

## 2025-07-19 PROCEDURE — 2500000001 HC RX 250 WO HCPCS SELF ADMINISTERED DRUGS (ALT 637 FOR MEDICARE OP): Performed by: NURSE PRACTITIONER

## 2025-07-19 PROCEDURE — 85027 COMPLETE CBC AUTOMATED: CPT | Performed by: STUDENT IN AN ORGANIZED HEALTH CARE EDUCATION/TRAINING PROGRAM

## 2025-07-19 PROCEDURE — 99232 SBSQ HOSP IP/OBS MODERATE 35: CPT | Performed by: INTERNAL MEDICINE

## 2025-07-19 PROCEDURE — 2500000005 HC RX 250 GENERAL PHARMACY W/O HCPCS

## 2025-07-19 PROCEDURE — 83735 ASSAY OF MAGNESIUM: CPT | Performed by: STUDENT IN AN ORGANIZED HEALTH CARE EDUCATION/TRAINING PROGRAM

## 2025-07-19 RX ORDER — SPIRONOLACTONE 25 MG/1
12.5 TABLET ORAL DAILY
Status: DISCONTINUED | OUTPATIENT
Start: 2025-07-19 | End: 2025-07-22 | Stop reason: HOSPADM

## 2025-07-19 RX ORDER — TALC
3 POWDER (GRAM) TOPICAL NIGHTLY PRN
Status: DISCONTINUED | OUTPATIENT
Start: 2025-07-19 | End: 2025-07-22 | Stop reason: HOSPADM

## 2025-07-19 RX ADMIN — CHLORHEXIDINE GLUCONATE 0.12% ORAL RINSE 15 ML: 1.2 LIQUID ORAL at 17:04

## 2025-07-19 RX ADMIN — OXYCODONE HYDROCHLORIDE 5 MG: 5 SOLUTION ORAL at 00:26

## 2025-07-19 RX ADMIN — AMPICILLIN SODIUM AND SULBACTAM SODIUM 3 G: 2; 1 INJECTION, POWDER, FOR SOLUTION INTRAMUSCULAR; INTRAVENOUS at 23:19

## 2025-07-19 RX ADMIN — ATORVASTATIN CALCIUM 40 MG: 40 TABLET, FILM COATED ORAL at 21:16

## 2025-07-19 RX ADMIN — BACITRACIN 1 APPLICATION: 500 OINTMENT TOPICAL at 09:09

## 2025-07-19 RX ADMIN — ACETAMINOPHEN 1000 MG: 160 SOLUTION ORAL at 21:16

## 2025-07-19 RX ADMIN — DIBASIC SODIUM PHOSPHATE, MONOBASIC POTASSIUM PHOSPHATE AND MONOBASIC SODIUM PHOSPHATE 250 MG: 852; 155; 130 TABLET ORAL at 12:58

## 2025-07-19 RX ADMIN — ENOXAPARIN SODIUM 40 MG: 100 INJECTION SUBCUTANEOUS at 09:09

## 2025-07-19 RX ADMIN — PANTOPRAZOLE SODIUM 40 MG: 40 INJECTION, POWDER, LYOPHILIZED, FOR SOLUTION INTRAVENOUS at 10:48

## 2025-07-19 RX ADMIN — Medication 3 MG: at 23:46

## 2025-07-19 RX ADMIN — ACETAMINOPHEN 1000 MG: 160 SOLUTION ORAL at 06:32

## 2025-07-19 RX ADMIN — Medication 50 MCG: at 09:09

## 2025-07-19 RX ADMIN — SPIRONOLACTONE 12.5 MG: 25 TABLET, FILM COATED ORAL at 15:13

## 2025-07-19 RX ADMIN — METOPROLOL TARTRATE 6.25 MG: 25 TABLET, FILM COATED ORAL at 09:09

## 2025-07-19 RX ADMIN — ASPIRIN 325 MG ORAL TABLET 325 MG: 325 PILL ORAL at 09:09

## 2025-07-19 RX ADMIN — Medication 1 APPLICATION: at 15:13

## 2025-07-19 RX ADMIN — AMPICILLIN SODIUM AND SULBACTAM SODIUM 3 G: 2; 1 INJECTION, POWDER, FOR SOLUTION INTRAMUSCULAR; INTRAVENOUS at 17:04

## 2025-07-19 RX ADMIN — Medication 1 APPLICATION: at 21:16

## 2025-07-19 RX ADMIN — METOPROLOL TARTRATE 6.25 MG: 25 TABLET, FILM COATED ORAL at 21:18

## 2025-07-19 RX ADMIN — HYDROGEN PEROXIDE 1 APPLICATION: 30 SOLUTION TOPICAL at 21:16

## 2025-07-19 RX ADMIN — CHLORHEXIDINE GLUCONATE 0.12% ORAL RINSE 15 ML: 1.2 LIQUID ORAL at 12:58

## 2025-07-19 RX ADMIN — AMPICILLIN SODIUM AND SULBACTAM SODIUM 3 G: 2; 1 INJECTION, POWDER, FOR SOLUTION INTRAMUSCULAR; INTRAVENOUS at 10:49

## 2025-07-19 RX ADMIN — HYDROGEN PEROXIDE 1 APPLICATION: 30 SOLUTION TOPICAL at 09:13

## 2025-07-19 RX ADMIN — AMPICILLIN SODIUM AND SULBACTAM SODIUM 3 G: 2; 1 INJECTION, POWDER, FOR SOLUTION INTRAMUSCULAR; INTRAVENOUS at 02:21

## 2025-07-19 RX ADMIN — ACETAMINOPHEN 1000 MG: 160 SOLUTION ORAL at 12:58

## 2025-07-19 RX ADMIN — HYDROGEN PEROXIDE 1 APPLICATION: 30 SOLUTION TOPICAL at 15:13

## 2025-07-19 RX ADMIN — CHLORHEXIDINE GLUCONATE 0.12% ORAL RINSE 15 ML: 1.2 LIQUID ORAL at 09:09

## 2025-07-19 ASSESSMENT — PAIN SCALES - GENERAL
PAINLEVEL_OUTOF10: 0 - NO PAIN
PAINLEVEL_OUTOF10: 2
PAINLEVEL_OUTOF10: 4

## 2025-07-19 ASSESSMENT — PAIN - FUNCTIONAL ASSESSMENT
PAIN_FUNCTIONAL_ASSESSMENT: 0-10
PAIN_FUNCTIONAL_ASSESSMENT: 0-10

## 2025-07-19 NOTE — PROGRESS NOTES
"  Otolaryngology - Head and Neck Surgery Progress Note             Shola Carbajal is a 80 y.o. male on day 2 of admission presenting with Cancer of oral cavity (Multi). No acute events overnight. Flap checks stable.    Subjective   Patient resting in bed this morning on rounds; pain controlled.      Objective   Vitals reviewed in EMR  Gen: NAD, AOx3, resting comfortably in bed  Eyes: EOMI, sclera clear, PERRL  Ears: Normal external ears bilaterally  Nose: No rhinorrhea; anterior nares clear  Oral Cavity: All intraoral incisions c/d/i without evidence of dehiscence  -Flap with one spot of bruising - stable lateral area of bruising  Head: normocephalic, atraumatic  Face/Neck:  All incisions c/d/i, neck soft and flat without evidence of hematoma or fluid collection, penroses (x3) with appropriate output after milking - not much coming out with milking  -External doppler with strong biphasic arterial signal  Resp: Non-labored breathing on 1L/min via nasal cannula  Cards: Irregular on Doppler  Gastro: Soft, non-distended,  PEG tube in place   : Dai catheter in place clear yellow urine  MSK: Left arm warm with intact movement and sensation  - thigh skin graft donor site with tegaderm in place  -moves all extremities  Psych: Appropriate mood and affect  Drains: All drains in place and holding suction with serosanguinous drainage (stripped)    Last Recorded Vitals  Blood pressure 105/65, pulse 89, temperature 36.8 °C (98.2 °F), temperature source Temporal, resp. rate 16, height 1.778 m (5' 10\"), weight 76.9 kg (169 lb 8.5 oz), SpO2 90%.  Intake/Output last 3 Shifts:  I/O last 3 completed shifts:  In: 1660 (21.6 mL/kg) [I.V.:1080 (14 mL/kg); NG/GT:580]  Out: 1645.5 (21.4 mL/kg) [Urine:1575 (0.6 mL/kg/hr); Drains:70.5]  Weight: 76.9 kg     Relevant Results  Results for orders placed or performed during the hospital encounter of 07/17/25 (from the past 24 hours)   POCT GLUCOSE   Result Value Ref Range    POCT Glucose 111 " (H) 74 - 99 mg/dL   Transthoracic Echo Complete   Result Value Ref Range    AV pk dorcas 1.05 m/s    LVOT diam 2.00 cm    Tricuspid annular plane systolic excursion 1.6 cm    RV free wall pk S' 6.13 cm/s    LVIDd 4.40 cm    RVSP 29 mmHg    Aortic Valve Area by Continuity of Peak Velocity 2.11 cm2    AV pk grad 4 mmHg   POCT GLUCOSE   Result Value Ref Range    POCT Glucose 104 (H) 74 - 99 mg/dL   Renal Function Panel   Result Value Ref Range    Glucose 93 74 - 99 mg/dL    Sodium 140 136 - 145 mmol/L    Potassium 4.0 3.5 - 5.3 mmol/L    Chloride 105 98 - 107 mmol/L    Bicarbonate 28 21 - 32 mmol/L    Anion Gap 11 10 - 20 mmol/L    Urea Nitrogen 29 (H) 6 - 23 mg/dL    Creatinine 1.33 (H) 0.50 - 1.30 mg/dL    eGFR 55 (L) >60 mL/min/1.73m*2    Calcium 9.5 8.6 - 10.6 mg/dL    Phosphorus 2.1 (L) 2.5 - 4.9 mg/dL    Albumin 3.6 3.4 - 5.0 g/dL   CBC   Result Value Ref Range    WBC 11.2 4.4 - 11.3 x10*3/uL    nRBC 0.0 0.0 - 0.0 /100 WBCs    RBC 2.66 (L) 4.50 - 5.90 x10*6/uL    Hemoglobin 8.5 (L) 13.5 - 17.5 g/dL    Hematocrit 26.1 (L) 41.0 - 52.0 %    MCV 98 80 - 100 fL    MCH 32.0 26.0 - 34.0 pg    MCHC 32.6 32.0 - 36.0 g/dL    RDW 14.7 (H) 11.5 - 14.5 %    Platelets 132 (L) 150 - 450 x10*3/uL   Magnesium   Result Value Ref Range    Magnesium 2.30 1.60 - 2.40 mg/dL      Transthoracic Echo Complete  Result Date: 7/18/2025   [facility], [address] [place] [address]                Tel [phone] and Fax [phone] TRANSTHORACIC ECHOCARDIOGRAM REPORT  Patient Name:       JANNET Dinh Physician:    [id number] Tc Sidhu MD Study Date:         7/18/2025           Ordering Provider:    [id number] AFTAB POLANCO MRN/PID:            [id number]            Fellow: Accession#:         [id number]        Nurse: Date of Birth/Age:  1945 / 80 years Sonographer:          Parul  Heidy [facility] Gender assigned at  M                   Additional Staff: Birth: Height:             177.80 cm           Admit Date:           7/17/2025 Weight:             76.66 kg            Admission Status:     Inpatient -                                                               Routine BSA / BMI:          1.94 m2 / 24.25     Encounter#:           [id number]                     kg/m2 Blood Pressure:     106/65 mmHg         Department Location:  [place] [address] Study Type:    TRANSTHORACIC ECHO (TTE) COMPLETE Diagnosis/ICD: Chronic systolic (congestive) heart failure (CHF)-I50.22 Indication:    chronic systolic heart failure CPT Code:      Echo Complete w Full Doppler-[id number] Patient History: Pertinent History: CAD s/p PCI, Ischemic cardiomyopathy, HFrEF s/p ICD, HTN,                    JAE, COPD, Hx. of MI, PVD, Oral cancer, PVD. Study Detail: The following Echo studies were performed: 2D, M-Mode, color flow               and Doppler. Technically challenging study due to postoperative               dressings and patient lying in supine position. Optison used as a               contrast agent for endocardial border definition. Total contrast               used for this procedure was 3 mL via IV push. Unable to obtain               suprasternal notch view.  PHYSICIAN INTERPRETATION: Left Ventricle: The left ventricle was not well visualized. The left ventricular ejection fraction could not be measured. The left ventricular cavity size was not assessed. There is normal septal and normal posterior left ventricular wall thickness. Left ventricular diastolic filling was not assessed. Left Atrium: The left atrial size was not well visualized. Right Ventricle: The right ventricle is moderately enlarged. There is moderately reduced right ventricular systolic function. A device is visualized in the right ventricle. Right Atrium: The right atrial size was not well visualized. There is a device visualized in the right atrium.  Aortic Valve: The aortic valve was not well visualized. There is no evidence of aortic valve regurgitation. Mitral Valve: The mitral valve was not well visualized. There is no evidence of mitral valve regurgitation. Tricuspid Valve: The tricuspid valve was not well visualized. There is trace tricuspid regurgitation. The Doppler estimated right ventricular systolic pressure (RVSP) is mildly elevated at 41 mmHg. Reported right ventricular systolic pressure may be underestimated due to incomplete or suboptimal Doppler envelope. Pulmonic Valve: The pulmonic valve was not assessed. Pulmonic valve regurgitation was not assessed. Pericardium: There is no pericardial effusion noted. Aorta: The aortic root was not well visualized. Systemic Veins: The inferior vena cava appears dilated, with IVC inspiratory collapse less than 50%. In comparison to the previous echocardiogram(s): There are no prior studies on this patient for comparison purposes.  CONCLUSIONS:  1. Poorly visualized anatomical structures due to suboptimal image quality.  2. The left ventricle was not well visualized despite the use of echocontrast. The left ventricular ejection fraction could not be measured. Consider alternative imaging.  3. There is moderately reduced right ventricular systolic function.  4. Moderately enlarged right ventricle.  5. The Doppler estimated RVSP is mildly elevated at 41 mmHg. QUANTITATIVE DATA SUMMARY:  2D MEASUREMENTS:         Normal Ranges: Ao Root d:       3.55 cm (2.0-3.7cm) IVSd:            1.00 cm (0.6-1.1cm) LVPWd:           0.90 cm (0.6-1.1cm) LVIDd:           4.40 cm (3.9-5.9cm) LVIDs:           3.60 cm LV Mass Index:   71 g/m2 LV % FS          18.2 %  AORTIC VALVE:           Normal Ranges: AoV Vmax:      1.05 m/s (<=1.7m/s) AoV Peak P.4 mmHg (<20mmHg) LVOT Max Mitch:  0.70 m/s (<=1.1m/s) LVOT VTI:      13.40 cm LVOT Diameter: 2.00 cm  (1.8-2.4cm) AoV Area,Vmax: 2.11 cm2 (2.5-4.5cm2)  RIGHT VENTRICLE: TAPSE: 16.0 mm  RV s'  0.06 m/s  TRICUSPID VALVE/RVSP:          Normal Ranges: Peak TR Velocity:     2.56 m/s Est. RA Pressure:     15 RV Syst Pressure:     41       (< 30mmHg) IVC Diam:             2.90 cm  PULMONIC VALVE:          Normal Ranges: PV Accel Time:  99 msec  (>120ms) PV Max Mitch:     0.8 m/s  (0.6-0.9m/s) PV Max P.7 mmHg  [id number] Tc Sidhu MD Electronically signed on 2025 at 5:21:28 PM  ** Final **        Scheduled medications  Scheduled Medications[1]  Continuous medications  Continuous Medications[2]  PRN medications  PRN Medications[3]            This patient has a urinary catheter   Reason for the urinary catheter remaining today? perioperative use for selected surgical procedures               Assessment & Plan  Cancer of oral cavity (Multi)    HTN (hypertension)    CAD (coronary artery disease)    Stented coronary artery    MI (myocardial infarction) (Multi)    CHF (congestive heart failure)    AICD (automatic cardioverter/defibrillator) present    Chronic obstructive pulmonary disease (Multi)    GI bleed    Hypothyroidism    Peripheral vascular disease    JAE (obstructive sleep apnea)    Gastroesophageal reflux disease    Pulmonary nodules    Assessment:  Shola Carbajal is a 80 y.o. male who underwent PEG, left oral cavity composite resection, left ND level 1B, left radial forearm free flap on 2025  by Dr. Chacon and Dr. Thomas.    Active Issues:  Oral cavity SCC  Acute blood loss anemia  HTN  HLD  CAD  CHF w/ EF of 30-35%  COPD  Hypothyroidism  CKD stage IIIa  PVD  JAE  GERD  Pulmonary nodules  Tobacco dependence  PTSD  Essential tremors  History of GI bleed       Plan:  -Flap Checks:  -q6hrs per ENT resident team; nursing per free flap pathway  -Drains:   -Monitor output  -Analgesia:   -Dilaudid PCA,discontinued  -PRN oxycodone  -Scheduled Acetaminophen  -FEN:   -mIVFs,  -NPO  -TF 2 trickle rate - need to advance today - advance tube feeds  -Monitor and replete electrolytes as  needed  -Pulm:   -wean oxygen to room air  -Incentive Spirometer 10x/hr while awake  -CXR today  -ID:   -Unasyn 3g Q6hrs x 4days, stop date 7/21  -Cardiac:  -Vitals per ENT free flap protocol then transition to Q4hr vitals  -Aspirin 325mg daily, Metoprolol 6.25 mg BID  -Follow up Echo  -Consult Heart Failure team  ----Discuss re echo and new o2 requirement, cosnider gutierrez removal  -Endo:    -ISS & POCT blood glucose testing  -GI:   -Bowel regimen, PPI,  and PRN anti-emetic  -:   -Gutierrez catheter x 24hrs  -Steroids/Special:   -Incision and oral care per ENT free flap protocol  -Embolic PPx:  -subcutaneous Lovenox  -SCDs while in bed  -Dispo:  -[facility]  -PT/OT ordered  -Discharge planning for POD 6 home with home care vs skilled nursing facility      Discussed with Dr. Clarke Cruz DO PGY-1  ENT           [1] acetaminophen, 975 mg, oral, q8h CLARICE   Or  acetaminophen, 1,000 mg, oral, q8h CLARICE   Or  acetaminophen, 1,000 mg, g-tube, q8h CLARICE  ampicillin-sulbactam, 3 g, intravenous, q6h  aspirin, 325 mg, g-tube, Daily  atorvastatin, 40 mg, g-tube, Nightly  bacitracin, , Topical, TID  chlorhexidine, 15 mL, Mouth/Throat, Daily  chlorhexidine, 15 mL, Mouth/Throat, TID after meals  cholecalciferol, 50 mcg, g-tube, Daily  enoxaparin, 40 mg, subcutaneous, q24h  [Held by provider] furosemide, 60 mg, g-tube, Daily  hydrogen peroxide, 1 Application, Topical, TID  metoprolol tartrate, 6.25 mg, g-tube, BID  pantoprazole, 40 mg, intravenous, Daily  perflutren lipid microspheres, 0.5-10 mL of dilution, intravenous, Once in imaging  polyethylene glycol, 17 g, oral, Daily  [Held by provider] sacubitriL-valsartan, 1 tablet, oral, BID  [Held by provider] spironolactone, 12.5 mg, g-tube, Daily  sulfur hexafluoride microsphr, 2 mL, intravenous, Once in imaging  white petrolatum, 1 Application, Topical, TID     [2]    [3] PRN medications: albuterol, dextrose, dextrose, glucagon, glucagon, naloxone, naloxone, ondansetron **OR**  ondansetron, oxyCODONE, oxyCODONE

## 2025-07-19 NOTE — NURSING NOTE
Pt able to attach syringe to peg tube, use clamp, open and close peg tube. Pt a little shaky holding syringe as this RN poured water into peg tube - this RN assisted pt holding syringe. Pt willing to participate in peg care and verbalizes understanding with education. Pt wife and sister-in-law were at bedside when this RN administered medications and verbalized understanding to education this RN provided them - no family has been hands on with  peg tube at this time. Will continue to encourage pt and family to be hands on with peg tube.

## 2025-07-19 NOTE — SIGNIFICANT EVENT
ENT Flap Check      Subjective:  Patient doing well. Resting in bed. His pain is controlled and tolerating tube feeds.    Objective:  Vitals reviewed in EMR  Gen: NAD, AOx3, resting comfortably in bed  Face/Neck: All incisions c/d/i without evidence of dehiscence  -penroses (x3) with appropriate output after milking  -External doppler with strong biphasic arterial signal  Oral Cavity: All intraoral incisions c/d/i without evidence of dehiscence  -Flap soft and pink with good capillary refill, lateral area of bruising is stable from prior check  Extremities: Left arm warm with intact movement and sensation  -Left thigh skin graft donor site with tegaderm in place  Abdomen: PEG in place  Drains: All drains in place with serosanguinous drainage (stripped)    Assessment/Plan:  Shola Carbajal is a 80 y.o. male who underwent PEG, left oral cavity composite resection, left ND level 1B, left radial forearm free flap on 7/17/2025. Flap check with lateral bruising - stable.       -Flap Checks: q6hrs  -Drains: Monitor output  -Analgesia: PCA  -FEN: mIVFs, NPO, Tfs PEG tube to gravity  -Cardio: Cardiology team following due to heart failure history, plan for formal echo 7/18      Lux Daly MD

## 2025-07-19 NOTE — CARE PLAN
The patient's goals for the shift include  pain control, OOB to chair    The clinical goals for the shift include flap stability; OOB to chair    Over the shift, the patient did make progress towards goals. Pt sat up  in chair for 1.5 hrs. Pt pain is under control with current pain regimen.

## 2025-07-19 NOTE — PROGRESS NOTES
Subjective Data:  Advanced Heart Failure Progress Note   Consulting Team:  Primary Cardiologist:  Reason for Consult:    Interval events  Initial consult placed for postoperative resumption of heart failure medications  Yesterday we recommended that he continue metoprolol, and hold diuresis as well as other GDMT meds.  Recommended to get echo.  Echo was done but it was a difficult echo, EF was incalculable due to poor imaging, mild RV dysfunction was seen  Today creatinine improved from 1.44->1.33  Overnight patient was placed on 2 L nasal cannula  Chest x-ray was done which showed no acute cardiopulmonary process        Plan  Patient appears euvolemic on exam  Can start home spironolactone today  Incentive spirometry at bedside        Subjective   Shola Carbajal is a 80 y.o. 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  Heart failure team consulted for management of diuresis and management of medication resumption postsurgery.     Patient was originally evaluated for new cancer by his dentist after breaking a tooth while eating. He was then biopsied by an outside ENT and referred here for management. Had resection and reconstruction 7/17.     Objective   Physical Exam  Vitals:    07/19/25 0702   BP: 107/61   Pulse: 98   Resp: 16   Temp: 37.1 °C (98.8 °F)   SpO2: 92%       GEN: Healthy appearing, well-developed, NAD.  CV: RRR, no m/r/g. JVP   LUNGS: CTAB, no w/r/c.  ABD: Soft, NT/ND, NBS, no masses or organomegaly.  SKIN: Warm, well perfused. No skin rashes or abnormal lesions.  No LE edema   MSK: Normal gait. No deformities.  EXT: No clubbing, cyanosis, or edema.  NEURO: Ambulating with no limitations. No focal deficits.    I have personally reviewed the following  images and laboratory findings:    ECG: normal sinus rhythm and PVC's are occasional and unifocal  .    Results for orders placed during the hospital encounter of 07/17/25    Transthoracic Echo Complete    Narrative  [facility], [address] [place] [address]  Tel [phone] and Fax [phone]    TRANSTHORACIC ECHOCARDIOGRAM REPORT      Patient Name:       JANNET FRANCES MARIA L       Reading Physician:    [id number] Tc Sdihu MD  Study Date:         7/18/2025           Ordering Provider:    [id number] AFTAB POLANCO  MRN/PID:            [id number]            Fellow:  Accession#:         [id number]        Nurse:  Date of Birth/Age:  1945 / 80 years Sonographer:          Parul Moody RDCS  Gender assigned at                     Additional Staff:  Birth:  Height:             177.80 cm           Admit Date:           7/17/2025  Weight:             76.66 kg            Admission Status:     Inpatient -  Routine  BSA / BMI:          1.94 m2 / 24.25     Encounter#:           [id number]  kg/m2  Blood Pressure:     106/65 mmHg         Department Location:  [place] [address]    Study Type:    TRANSTHORACIC ECHO (TTE) COMPLETE  Diagnosis/ICD: Chronic systolic (congestive) heart failure (CHF)-I50.22  Indication:    chronic systolic heart failure  CPT Code:      Echo Complete w Full Doppler-[id number]    Patient History:  Pertinent History: CAD s/p PCI, Ischemic cardiomyopathy, HFrEF s/p ICD, HTN,  JAE, COPD, Hx. of MI, PVD, Oral cancer, PVD.    Study Detail: The following Echo studies were performed: 2D, M-Mode, color flow  and Doppler. Technically challenging study due to postoperative  dressings and patient lying in supine position. Optison used as a  contrast agent for endocardial border definition. Total contrast  used for this procedure was 3 mL via IV push. Unable to obtain  suprasternal notch view.      PHYSICIAN INTERPRETATION:  Left Ventricle: The left ventricle was not well visualized. The left ventricular  ejection fraction could not be measured. The left ventricular cavity size was not assessed. There is normal septal and normal posterior left ventricular wall thickness. Left ventricular diastolic filling was not assessed.  Left Atrium: The left atrial size was not well visualized.  Right Ventricle: The right ventricle is moderately enlarged. There is moderately reduced right ventricular systolic function. A device is visualized in the right ventricle.  Right Atrium: The right atrial size was not well visualized. There is a device visualized in the right atrium.  Aortic Valve: The aortic valve was not well visualized. There is no evidence of aortic valve regurgitation.  Mitral Valve: The mitral valve was not well visualized. There is no evidence of mitral valve regurgitation.  Tricuspid Valve: The tricuspid valve was not well visualized. There is trace tricuspid regurgitation. The Doppler estimated right ventricular systolic pressure (RVSP) is mildly elevated at 41 mmHg. Reported right ventricular systolic pressure may be underestimated due to incomplete or suboptimal Doppler envelope.  Pulmonic Valve: The pulmonic valve was not assessed. Pulmonic valve regurgitation was not assessed.  Pericardium: There is no pericardial effusion noted.  Aorta: The aortic root was not well visualized.  Systemic Veins: The inferior vena cava appears dilated, with IVC inspiratory collapse less than 50%.  In comparison to the previous echocardiogram(s): There are no prior studies on this patient for comparison purposes.      CONCLUSIONS:  1. Poorly visualized anatomical structures due to suboptimal image quality.  2. The left ventricle was not well visualized despite the use of echocontrast. The left ventricular ejection fraction could not be measured. Consider alternative imaging.  3. There is moderately reduced right ventricular systolic function.  4. Moderately enlarged right ventricle.  5. The Doppler estimated RVSP is mildly  "elevated at 41 mmHg.    QUANTITATIVE DATA SUMMARY:    2D MEASUREMENTS:         Normal Ranges:  Ao Root d:       3.55 cm (2.0-3.7cm)  IVSd:            1.00 cm (0.6-1.1cm)  LVPWd:           0.90 cm (0.6-1.1cm)  LVIDd:           4.40 cm (3.9-5.9cm)  LVIDs:           3.60 cm  LV Mass Index:   71 g/m2  LV % FS          18.2 %      AORTIC VALVE:           Normal Ranges:  AoV Vmax:      1.05 m/s (<=1.7m/s)  AoV Peak P.4 mmHg (<20mmHg)  LVOT Max Mitch:  0.70 m/s (<=1.1m/s)  LVOT VTI:      13.40 cm  LVOT Diameter: 2.00 cm  (1.8-2.4cm)  AoV Area,Vmax: 2.11 cm2 (2.5-4.5cm2)      RIGHT VENTRICLE:  TAPSE: 16.0 mm  RV s'  0.06 m/s      TRICUSPID VALVE/RVSP:          Normal Ranges:  Peak TR Velocity:     2.56 m/s  Est. RA Pressure:     15  RV Syst Pressure:     41       (< 30mmHg)  IVC Diam:             2.90 cm      PULMONIC VALVE:          Normal Ranges:  PV Accel Time:  99 msec  (>120ms)  PV Max Mitch:     0.8 m/s  (0.6-0.9m/s)  PV Max P.7 mmHg      [id number] Tc Sidhu MD  Electronically signed on 2025 at 5:21:28 PM        ** Final **       Imaging  Chest x-ray: normal      Lab Review   CMP:  Recent Labs     25  0613 25  0619 07/17/25  2213 07/10/25  1050    140 140 138   K 4.0 4.6 4.4 4.3    105 104 103   CO2 28 26 25 28   ANIONGAP 11 14 15 11   BUN 29* 28* 27* 20   CREATININE 1.33* 1.43* 1.44* 1.27   EGFR 55* 50* 50* 58*   MG 2.30 1.95 1.86 2.00     Recent Labs     25  0613 25  0619 07/17/25  2213   ALBUMIN 3.6 3.8 3.9     CBC:  Recent Labs     25  0613 25  0619 25  2213 07/10/25  1050   WBC 11.2 14.1* 16.9* 8.2   HGB 8.5* 8.9* 9.6* 14.5   HCT 26.1* 27.5* 30.1* 44.0   * 148* 148* 202   MCV 98 98 101* 99     COAG: No results for input(s): \"PTT\", \"INR\", \"ARIXTRA\", \"HAUF\", \"DDIMERVTE\", \"HAPTOGLOBIN\", \"FIBRINOGEN\" in the last 31673 hours.  ABO:   Recent Labs     25  0616   ABO O     HEME/ENDO:   Recent Labs     07/10/25  1050 23  1354   TSH 1.90 " " --    HGBA1C  --  5.4     CARDIAC:   Recent Labs     07/19/25  0613   *     No results for input(s): \"CHOL\", \"LDLF\", \"LDLCALC\", \"HDL\", \"TRIG\" in the last 64011 hours.  TOX:No results for input(s): \"AMPHETAMINE\", \"BARBSCRNUR\", \"BENZO\", \"CANNABINOID\", \"COCAI\", \"FENTANYL\", \"OPIATE\", \"OXYCODONE\", \"PCP\" in the last 21450 hours.  MICRO: No results for input(s): \"ESR\", \"CRP\", \"PROCAL\" in the last 63754 hours.  No results found for the last 90 days.       No results found for: \"TROPHS\", \"BNP\", \"LDLF\", \"TRIG\", \"NMRTOT\"     Assessment and Plan     Shola Carbajal is a 80 y.o. 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  Heart failure team consulted for management of diuresis and management of medication resumption postsurgery.     Chronic HFrEF (NYHA Class II/AHA Stage C)  Patient presented for recent surgery.  In lieu of that spironolactone, furosemide, and Entresto were held.  No echo seen in hospital records.  Per prior notes EF is 30 to 35% most recently in 2023.  Medications patient takes at home include aspirin 81 mg, atorvastatin 40, furosemide 60 mg daily, metoprolol 25 mg, Entresto 24-26 mg, and spironolactone 12.5 mg.  Right now patient is currently on metoprolol 6.5 mg twice daily.  - Echo was done EF cannot be estimated. No need to repeat echo as it will not   - Diuresis: Patient appears to be euvolemic on exam  - GDMT              - SGLT-2i: Not on any due to history of penile rash.              - Beta-blocker: Continue with metoprolol 6.25 twice daily which is currently used because patient has a PEG and succinate cannot be crushed.              - ACE-I/ARB/ARNI: Continue to hold Entresto in the setting of KULWANT.  Can resume once " resolved              - MRA: Start home spironolactone 12.5 mg              - Vasodilators: No vasodilators  - Inotropes: None  - RV support: None  - Mechanical support: None  - AICD/CRT-D/Lifevest: ICD placed 2023  - Strict I/Os, Daily Na < 2g daily, fluid restriction 2 L daily     For his A-fib patient did not tolerate Plavix in the past due to GI bleed.  Unsure if he will tolerate any oral anticoagulant.     Will plan on resuming medications as patient gradually improves during his hospital stay. Can give free water boluses.      Thank you for the opportunity to involve us in the care of this fine individual. This plan was discussed with Dr Aguirre, HF attending. For any questions or concerns, feel free to reach out via D4P chat or page at [phone].    Lexi Ness MD   Heart Failure Fellow  PGY-4           Lexi Ness MD      Patient remains hemodynamically stable a bit hypoxic but volume status does not suggest hypervolemia would restart spironolactone but he does not appear to need furosemide at this time would continue to hold his Entresto given borderline blood pressures    I saw and evaluated the patient. I personally obtained the key and critical portions of the history and physical exam or was physically present for key and critical portions performed by the resident/fellow. I reviewed the resident/fellow's documentation and discussed the patient with the resident/fellow. I agree with the resident/fellow's medical decision making as documented in the note.

## 2025-07-19 NOTE — SIGNIFICANT EVENT
ENT Flap Check      Subjective:  Patient doing well. Resting in bed. His pain is controlled and tolerating tube feeds.    Objective:  Vitals reviewed in EMR  Gen: NAD, AOx3, resting comfortably in bed  Face/Neck: All incisions c/d/i without evidence of dehiscence  -penroses (x3) with appropriate output after milking  -External doppler with strong biphasic arterial signal  Oral Cavity: All intraoral incisions c/d/i without evidence of dehiscence  -Flap soft and pink with good capillary refill, lateral area of bruising is stable from prior check  Extremities: Left arm warm with intact movement and sensation  -Left thigh skin graft donor site with tegaderm in place  Abdomen: PEG in place  Drains: All drains in place with serosanguinous drainage (stripped)    Assessment/Plan:  Shola Carbajal is a 80 y.o. male who underwent PEG, left oral cavity composite resection, left ND level 1B, left radial forearm free flap on 7/17/2025. Flap check with lateral bruising - stable.       -Plan per note from today  --Pending HF recs  --Advancing TF      Angel Cruz DO PGY-1  ENT

## 2025-07-19 NOTE — NURSING NOTE
ENT  Pt was hands on with peg tube usage. Pt is able to clamp and unclamp. Has not tried to be completely independent but is moving towards it

## 2025-07-20 LAB
ALBUMIN SERPL BCP-MCNC: 3.5 G/DL (ref 3.4–5)
ANION GAP SERPL CALC-SCNC: 10 MMOL/L (ref 10–20)
BUN SERPL-MCNC: 30 MG/DL (ref 6–23)
CALCIUM SERPL-MCNC: 9.8 MG/DL (ref 8.6–10.6)
CHLORIDE SERPL-SCNC: 105 MMOL/L (ref 98–107)
CO2 SERPL-SCNC: 30 MMOL/L (ref 21–32)
CREAT SERPL-MCNC: 1.27 MG/DL (ref 0.5–1.3)
EGFRCR SERPLBLD CKD-EPI 2021: 58 ML/MIN/1.73M*2
ERYTHROCYTE [DISTWIDTH] IN BLOOD BY AUTOMATED COUNT: 14.2 % (ref 11.5–14.5)
GLUCOSE SERPL-MCNC: 104 MG/DL (ref 74–99)
HCT VFR BLD AUTO: 23.7 % (ref 41–52)
HGB BLD-MCNC: 7.9 G/DL (ref 13.5–17.5)
MAGNESIUM SERPL-MCNC: 2.56 MG/DL (ref 1.6–2.4)
MCH RBC QN AUTO: 32 PG (ref 26–34)
MCHC RBC AUTO-ENTMCNC: 33.3 G/DL (ref 32–36)
MCV RBC AUTO: 96 FL (ref 80–100)
NRBC BLD-RTO: 0 /100 WBCS (ref 0–0)
PHOSPHATE SERPL-MCNC: 2.3 MG/DL (ref 2.5–4.9)
PLATELET # BLD AUTO: 150 X10*3/UL (ref 150–450)
POTASSIUM SERPL-SCNC: 4.1 MMOL/L (ref 3.5–5.3)
RBC # BLD AUTO: 2.47 X10*6/UL (ref 4.5–5.9)
SODIUM SERPL-SCNC: 141 MMOL/L (ref 136–145)
WBC # BLD AUTO: 9.7 X10*3/UL (ref 4.4–11.3)

## 2025-07-20 PROCEDURE — 2500000001 HC RX 250 WO HCPCS SELF ADMINISTERED DRUGS (ALT 637 FOR MEDICARE OP): Performed by: STUDENT IN AN ORGANIZED HEALTH CARE EDUCATION/TRAINING PROGRAM

## 2025-07-20 PROCEDURE — 2500000001 HC RX 250 WO HCPCS SELF ADMINISTERED DRUGS (ALT 637 FOR MEDICARE OP)

## 2025-07-20 PROCEDURE — 99232 SBSQ HOSP IP/OBS MODERATE 35: CPT | Performed by: INTERNAL MEDICINE

## 2025-07-20 PROCEDURE — 1200000003 HC ONCOLOGY  ROOM WITH TELEMETRY DAILY

## 2025-07-20 PROCEDURE — 85027 COMPLETE CBC AUTOMATED: CPT

## 2025-07-20 PROCEDURE — 36415 COLL VENOUS BLD VENIPUNCTURE: CPT

## 2025-07-20 PROCEDURE — 2500000002 HC RX 250 W HCPCS SELF ADMINISTERED DRUGS (ALT 637 FOR MEDICARE OP, ALT 636 FOR OP/ED): Performed by: STUDENT IN AN ORGANIZED HEALTH CARE EDUCATION/TRAINING PROGRAM

## 2025-07-20 PROCEDURE — 99024 POSTOP FOLLOW-UP VISIT: CPT | Performed by: OTOLARYNGOLOGY

## 2025-07-20 PROCEDURE — 80069 RENAL FUNCTION PANEL: CPT

## 2025-07-20 PROCEDURE — 83735 ASSAY OF MAGNESIUM: CPT

## 2025-07-20 PROCEDURE — 2500000004 HC RX 250 GENERAL PHARMACY W/ HCPCS (ALT 636 FOR OP/ED): Performed by: STUDENT IN AN ORGANIZED HEALTH CARE EDUCATION/TRAINING PROGRAM

## 2025-07-20 RX ORDER — POLYETHYLENE GLYCOL 3350 17 G/17G
17 POWDER, FOR SOLUTION ORAL 2 TIMES DAILY
Status: DISCONTINUED | OUTPATIENT
Start: 2025-07-20 | End: 2025-07-20

## 2025-07-20 RX ORDER — SODIUM,POTASSIUM PHOSPHATES 280-250MG
1 POWDER IN PACKET (EA) ORAL 4 TIMES DAILY
Status: COMPLETED | OUTPATIENT
Start: 2025-07-20 | End: 2025-07-21

## 2025-07-20 RX ORDER — POLYETHYLENE GLYCOL 3350 17 G/17G
17 POWDER, FOR SOLUTION ORAL DAILY
Status: DISCONTINUED | OUTPATIENT
Start: 2025-07-22 | End: 2025-07-22 | Stop reason: HOSPADM

## 2025-07-20 RX ADMIN — ACETAMINOPHEN 1000 MG: 160 SOLUTION ORAL at 05:19

## 2025-07-20 RX ADMIN — HYDROGEN PEROXIDE 1 APPLICATION: 30 SOLUTION TOPICAL at 08:39

## 2025-07-20 RX ADMIN — SPIRONOLACTONE 12.5 MG: 25 TABLET, FILM COATED ORAL at 08:34

## 2025-07-20 RX ADMIN — Medication 1 APPLICATION: at 08:39

## 2025-07-20 RX ADMIN — AMPICILLIN SODIUM AND SULBACTAM SODIUM 3 G: 2; 1 INJECTION, POWDER, FOR SOLUTION INTRAMUSCULAR; INTRAVENOUS at 05:19

## 2025-07-20 RX ADMIN — AMPICILLIN SODIUM AND SULBACTAM SODIUM 3 G: 2; 1 INJECTION, POWDER, FOR SOLUTION INTRAMUSCULAR; INTRAVENOUS at 22:06

## 2025-07-20 RX ADMIN — HYDROGEN PEROXIDE 1 APPLICATION: 30 SOLUTION TOPICAL at 20:38

## 2025-07-20 RX ADMIN — PANTOPRAZOLE SODIUM 40 MG: 40 INJECTION, POWDER, LYOPHILIZED, FOR SOLUTION INTRAVENOUS at 08:39

## 2025-07-20 RX ADMIN — ACETAMINOPHEN 1000 MG: 160 SOLUTION ORAL at 22:06

## 2025-07-20 RX ADMIN — METOPROLOL TARTRATE 6.25 MG: 25 TABLET, FILM COATED ORAL at 20:37

## 2025-07-20 RX ADMIN — POLYETHYLENE GLYCOL 3350 17 G: 17 POWDER, FOR SOLUTION ORAL at 08:39

## 2025-07-20 RX ADMIN — CHLORHEXIDINE GLUCONATE 0.12% ORAL RINSE 15 ML: 1.2 LIQUID ORAL at 16:13

## 2025-07-20 RX ADMIN — CHLORHEXIDINE GLUCONATE 0.12% ORAL RINSE 15 ML: 1.2 LIQUID ORAL at 08:39

## 2025-07-20 RX ADMIN — POLYETHYLENE GLYCOL 3350 17 G: 17 POWDER, FOR SOLUTION ORAL at 20:37

## 2025-07-20 RX ADMIN — ATORVASTATIN CALCIUM 40 MG: 40 TABLET, FILM COATED ORAL at 20:37

## 2025-07-20 RX ADMIN — AMPICILLIN SODIUM AND SULBACTAM SODIUM 3 G: 2; 1 INJECTION, POWDER, FOR SOLUTION INTRAMUSCULAR; INTRAVENOUS at 16:13

## 2025-07-20 RX ADMIN — Medication 50 MCG: at 08:39

## 2025-07-20 RX ADMIN — POTASSIUM & SODIUM PHOSPHATES POWDER PACK 280-160-250 MG 1 PACKET: 280-160-250 PACK at 20:38

## 2025-07-20 RX ADMIN — Medication 1 APPLICATION: at 20:38

## 2025-07-20 RX ADMIN — HYDROGEN PEROXIDE 1 APPLICATION: 30 SOLUTION TOPICAL at 14:37

## 2025-07-20 RX ADMIN — CHLORHEXIDINE GLUCONATE 0.12% ORAL RINSE 15 ML: 1.2 LIQUID ORAL at 14:37

## 2025-07-20 RX ADMIN — AMPICILLIN SODIUM AND SULBACTAM SODIUM 3 G: 2; 1 INJECTION, POWDER, FOR SOLUTION INTRAMUSCULAR; INTRAVENOUS at 10:35

## 2025-07-20 RX ADMIN — Medication 1 APPLICATION: at 14:37

## 2025-07-20 RX ADMIN — ASPIRIN 325 MG ORAL TABLET 325 MG: 325 PILL ORAL at 08:39

## 2025-07-20 RX ADMIN — METOPROLOL TARTRATE 6.25 MG: 25 TABLET, FILM COATED ORAL at 08:34

## 2025-07-20 ASSESSMENT — COGNITIVE AND FUNCTIONAL STATUS - GENERAL
PERSONAL GROOMING: A LITTLE
HELP NEEDED FOR BATHING: A LITTLE
MOBILITY SCORE: 22
WALKING IN HOSPITAL ROOM: A LITTLE
DRESSING REGULAR LOWER BODY CLOTHING: A LITTLE
DRESSING REGULAR UPPER BODY CLOTHING: A LITTLE
CLIMB 3 TO 5 STEPS WITH RAILING: A LITTLE
MOBILITY SCORE: 21
EATING MEALS: A LITTLE
DAILY ACTIVITIY SCORE: 19
DRESSING REGULAR UPPER BODY CLOTHING: A LITTLE
STANDING UP FROM CHAIR USING ARMS: A LITTLE
PERSONAL GROOMING: A LITTLE
EATING MEALS: A LITTLE
WALKING IN HOSPITAL ROOM: A LITTLE
DAILY ACTIVITIY SCORE: 19
CLIMB 3 TO 5 STEPS WITH RAILING: A LITTLE
DRESSING REGULAR LOWER BODY CLOTHING: A LITTLE
HELP NEEDED FOR BATHING: A LITTLE

## 2025-07-20 ASSESSMENT — PAIN SCALES - GENERAL
PAINLEVEL_OUTOF10: 1
PAINLEVEL_OUTOF10: 0 - NO PAIN

## 2025-07-20 ASSESSMENT — PAIN - FUNCTIONAL ASSESSMENT: PAIN_FUNCTIONAL_ASSESSMENT: 0-10

## 2025-07-20 NOTE — SIGNIFICANT EVENT
Rapid Response Nurse Note: RADAR alert: 7    Pager time:   Arrival time:   Event end time:   Location:   [x] Triage by phone or secure messaging    Rapid response initiated by:  [] Rapid response RN [] Family [] Nursing Supervisor [] Physician   [x] RADAR auto page [] Sepsis auto-page [] RN [] RT   [] NP/PA [] Other:     Primary reason for call:   [] BAT [] New CPAP/BiPAP [] Bleeding [] Change in mental status   [] Chest pain [] Code blue [] FiO2 >/= 50% [] HR </= 40 bpm   [] HR >/= 130 bpm [] Hyperglycemia [] Hypoglycemia [x] RADAR    [] RR </= 8 bpm [] RR >/= 30 bpm [] SBP </= 90 mmHg [] SpO2 < 90%   [] Seizure [] Sepsis [] Shortness of breath  [] Staff concern: see comments     Initial VS and/or RADAR VS: T 36.4 °C; HR 68; RR 16; BP 97/60; SPO2 91%.    Providers present at bedside (if applicable): NA    Name of ICU Provider contacted (if applicable): NA    Interventions:  [x] None [] ABG/VBG [] Assist w/ICU transfer [] BAT paged    [] Bag mask [] Blood [] Cardioversion [] Code Blue   [] Code blue for intubation [] Code status changed [] Chest x-ray [] EKG   [] IV fluid/bolus [] KUB x-ray [] Labs/cultures [] Medication   [] Nebulizer treatment [] NIPPV (CPAP/BiPAP) [] Oxygen [] Oral airway   [] Peripheral IV [] Palliative care consult [] CT/MRI [] Sepsis protocol    [] Suctioned [] Other:     Outcome:  [] Coded and  [] Code blue for intubation [] Coded and transferred to ICU []  on division   [x] Remained on division (no change) [] Remained on division + additional monitoring [] Remained in ED [] Transferred to ED   [] Transferred to ICU [] Transferred to inpatient status [] Transferred for interventions (procedure) [] Transferred to ICU stepdown    [] Transferred to surgery [] Transferred to telemetry [] Sepsis protocol [] STEMI protocol   [] Stroke protocol [x] Bedside nurse instructed to page rapid response for any concerns or acute change in condition/VS     Additional Comments:    Reviewed above RADAR VS with bedside RN via phone.  Vital signs within patient's current trends.  No acute change in condition.  No interventions by rapid response team indicated at this time.  Staff to page rapid response for any concerns or acute change in condition or VS.

## 2025-07-20 NOTE — PROGRESS NOTES
"  Otolaryngology - Head and Neck Surgery Progress Note             Shola Carbajal is a 80 y.o. male on day 3 of admission presenting with Cancer of oral cavity (Multi). Overnight patient was disruptive and required 1:1 sitter, Flap Checks stable    Subjective   Patient resting in chair this morning on rounds; pain controlled. Nurse stated that she was told by patient's wife he experiences sundowning/dementia   After rounds, patient repeatedly getting out chair, behavioral team called for support.    Objective   Vitals reviewed in EMR  Gen: NAD, AOx3, resting comfortably in chair  Eyes: EOMI, sclera clear, PERRL  Ears: Normal external ears bilaterally  Nose: No rhinorrhea; anterior nares clear  Oral Cavity: All intraoral incisions c/d/i without evidence of dehiscence  -Flap with one spot of bruising - stable lateral area of bruising  Head: normocephalic, atraumatic  Face/Neck:  All incisions c/d/i, neck soft and flat without evidence of hematoma or fluid collection, penroses (x3) with appropriate output after milking - not much coming out with milking  -External doppler with strong biphasic arterial signal  Resp: Non-labored breathing on RA  Cards: Irregular on Doppler  Gastro: Soft, non-distended,  PEG tube in place   : Dai catheter not in place.  MSK: Left arm warm with intact movement and sensation  - thigh skin graft donor site with tegaderm in place  -moves all extremities  Psych: Appropriate mood and affect  Drains: All drains in place and holding suction with serosanguinous drainage (stripped)    Last Recorded Vitals  Blood pressure 126/69, pulse 89, temperature 36.8 °C (98.2 °F), temperature source Temporal, resp. rate 16, height 1.778 m (5' 10\"), weight 76.9 kg (169 lb 8.5 oz), SpO2 94%.  Intake/Output last 3 Shifts:  I/O last 3 completed shifts:  In: 930 (12.1 mL/kg) [NG/GT:930]  Out: 1108 (14.4 mL/kg) [Urine:1075 (0.4 mL/kg/hr); Drains:33]  Weight: 76.9 kg     Relevant Results  No results found for this " or any previous visit (from the past 24 hours).     XR chest 1 view  Result Date: 7/19/2025  Interpreted By:  Ludin Mata, STUDY: XR CHEST 1 VIEW;  7/19/2025 9:25 am   INDICATION: Signs/Symptoms:Increased oxygen requirement.   COMPARISON: Chest CT 07/01/2025.   ACCESSION NUMBER(S): [id number]   ORDERING CLINICIAN: AFTAB POLANCO   FINDINGS: AP radiograph of the chest. Multiple skin staples project over included left lower neck. There is a left subclavian approach dual chamber cardiac pacemaker/AICD again seen in place.   CARDIOMEDIASTINAL SILHOUETTE: The cardiomediastinal silhouette is stable in size and configuration. Mild aortic knob calcification.   LUNGS: There is no pulmonary edema. No focal consolidation or sizeable pleural effusion is identified. No pneumothorax is seen.   ABDOMEN: No remarkable upper abdominal findings.   BONES: Remote fracture deformities of bilateral ribs. Otherwise, no acute osseous abnormality.       1. No evidence of acute cardiopulmonary process.   Signed by: Ludin Mata 7/19/2025 10:53 AM Dictation workstation:   [id number]    Transthoracic Echo Complete  Result Date: 7/18/2025   [facility], [address] [place] [address]                Tel [phone] and Fax [phone] TRANSTHORACIC ECHOCARDIOGRAM REPORT  Patient Name:       JANNET Dinh Physician:    [id number] Tc Sidhu MD Study Date:         7/18/2025           Ordering Provider:    [id number] AFTAB POLANCO MRN/PID:            [id number]            Fellow: Accession#:         [id number]        Nurse: Date of Birth/Age:  1945 / 80 years Sonographer:          Parul Moody GUMARO Gender assigned at                     Additional Staff: Birth: Height:             177.80 cm           Admit Date:           7/17/2025 Weight:             76.66 kg            Admission  Status:     Inpatient -                                                               Routine BSA / BMI:          1.94 m2 / 24.25     Encounter#:           [id number]                     kg/m2 Blood Pressure:     106/65 mmHg         Department Location:  [place] [address] Study Type:    TRANSTHORACIC ECHO (TTE) COMPLETE Diagnosis/ICD: Chronic systolic (congestive) heart failure (CHF)-I50.22 Indication:    chronic systolic heart failure CPT Code:      Echo Complete w Full Doppler-[id number] Patient History: Pertinent History: CAD s/p PCI, Ischemic cardiomyopathy, HFrEF s/p ICD, HTN,                    JAE, COPD, Hx. of MI, PVD, Oral cancer, PVD. Study Detail: The following Echo studies were performed: 2D, M-Mode, color flow               and Doppler. Technically challenging study due to postoperative               dressings and patient lying in supine position. Optison used as a               contrast agent for endocardial border definition. Total contrast               used for this procedure was 3 mL via IV push. Unable to obtain               suprasternal notch view.  PHYSICIAN INTERPRETATION: Left Ventricle: The left ventricle was not well visualized. The left ventricular ejection fraction could not be measured. The left ventricular cavity size was not assessed. There is normal septal and normal posterior left ventricular wall thickness. Left ventricular diastolic filling was not assessed. Left Atrium: The left atrial size was not well visualized. Right Ventricle: The right ventricle is moderately enlarged. There is moderately reduced right ventricular systolic function. A device is visualized in the right ventricle. Right Atrium: The right atrial size was not well visualized. There is a device visualized in the right atrium. Aortic Valve: The aortic valve was not well visualized. There is no evidence of aortic valve regurgitation. Mitral Valve: The mitral valve was not well visualized. There is no evidence of mitral valve  regurgitation. Tricuspid Valve: The tricuspid valve was not well visualized. There is trace tricuspid regurgitation. The Doppler estimated right ventricular systolic pressure (RVSP) is mildly elevated at 41 mmHg. Reported right ventricular systolic pressure may be underestimated due to incomplete or suboptimal Doppler envelope. Pulmonic Valve: The pulmonic valve was not assessed. Pulmonic valve regurgitation was not assessed. Pericardium: There is no pericardial effusion noted. Aorta: The aortic root was not well visualized. Systemic Veins: The inferior vena cava appears dilated, with IVC inspiratory collapse less than 50%. In comparison to the previous echocardiogram(s): There are no prior studies on this patient for comparison purposes.  CONCLUSIONS:  1. Poorly visualized anatomical structures due to suboptimal image quality.  2. The left ventricle was not well visualized despite the use of echocontrast. The left ventricular ejection fraction could not be measured. Consider alternative imaging.  3. There is moderately reduced right ventricular systolic function.  4. Moderately enlarged right ventricle.  5. The Doppler estimated RVSP is mildly elevated at 41 mmHg. QUANTITATIVE DATA SUMMARY:  2D MEASUREMENTS:         Normal Ranges: Ao Root d:       3.55 cm (2.0-3.7cm) IVSd:            1.00 cm (0.6-1.1cm) LVPWd:           0.90 cm (0.6-1.1cm) LVIDd:           4.40 cm (3.9-5.9cm) LVIDs:           3.60 cm LV Mass Index:   71 g/m2 LV % FS          18.2 %  AORTIC VALVE:           Normal Ranges: AoV Vmax:      1.05 m/s (<=1.7m/s) AoV Peak P.4 mmHg (<20mmHg) LVOT Max Mitch:  0.70 m/s (<=1.1m/s) LVOT VTI:      13.40 cm LVOT Diameter: 2.00 cm  (1.8-2.4cm) AoV Area,Vmax: 2.11 cm2 (2.5-4.5cm2)  RIGHT VENTRICLE: TAPSE: 16.0 mm RV s'  0.06 m/s  TRICUSPID VALVE/RVSP:          Normal Ranges: Peak TR Velocity:     2.56 m/s Est. RA Pressure:     15 RV Syst Pressure:     41       (< 30mmHg) IVC Diam:             2.90 cm  PULMONIC  VALVE:          Normal Ranges: PV Accel Time:  99 msec  (>120ms) PV Max Mitch:     0.8 m/s  (0.6-0.9m/s) PV Max P.7 mmHg  [id number] Tc Sidhu MD Electronically signed on 2025 at 5:21:28 PM  ** Final **        Scheduled medications  Scheduled Medications[1]  Continuous medications  Continuous Medications[2]  PRN medications  PRN Medications[3]            This patient no longer has urinary catherter as it was not functioning properly. Patient urinated on floor overnight. Explained he can call for nurse to go to bathroom or use urinal.                Assessment & Plan  Cancer of oral cavity (Multi)    HTN (hypertension)    CAD (coronary artery disease)    Stented coronary artery    MI (myocardial infarction) (Multi)    CHF (congestive heart failure)    AICD (automatic cardioverter/defibrillator) present    Chronic obstructive pulmonary disease (Multi)    GI bleed    Hypothyroidism    Peripheral vascular disease    JAE (obstructive sleep apnea)    Gastroesophageal reflux disease    Pulmonary nodules    Assessment:  Shola Carbajal is a 80 y.o. male who underwent PEG, left oral cavity composite resection, left ND level 1B, left radial forearm free flap on 2025  by Dr. Chacon and Dr. Thomas.    Active Issues:  Oral cavity SCC  Acute blood loss anemia  HTN  HLD  CAD  CHF w/ EF of 30-35%  COPD  Hypothyroidism  CKD stage IIIa  PVD  JAE  GERD  Pulmonary nodules  Tobacco dependence  PTSD  Essential tremors  History of GI bleed       Plan:  -Flap Checks:  -q6hrs per ENT resident team; nursing per free flap pathway  -Drains:   -Monitor output  -Analgesia:   -Dilaudid PCA,discontinued  -PRN oxycodone  -Scheduled Acetaminophen  -FEN:   -mIVFs,  -NPO  -TF advancing to goal.  -Monitor and replete electrolytes as needed  -Pulm:   -wean oxygen to room air  -Incentive Spirometer 10x/hr while awake  -CXR today  -ID:   -Unasyn 3g Q6hrs x 4days, stop date   -Cardiac:  -Vitals per ENT free flap protocol then  transition to Q4hr vitals  -Aspirin 325mg daily, Metoprolol 6.25 mg BID  -Follow up Echo  -Consult Heart Failure team  ----Discuss re echo and new o2 requirement,   -Endo:    -ISS & POCT blood glucose testing  -GI:   -Bowel regimen( BID Miralax started), PPI,  and PRN anti-emetic  -F/U for BM today  -:   -Dai not in place, was not properly functioning.   -Steroids/Special:   -Incision and oral care per ENT free flap protocol  -Embolic PPx:  -subcutaneous Lovenox  -SCDs while in bed  -Dispo:  -[facility]  -PT/OT ordered  -Discharge planning for POD 6 home with home care vs skilled nursing facility      Discussed with Dr. Yolie Payan MS4  ENT             [1] acetaminophen, 975 mg, oral, q8h CLARICE   Or  acetaminophen, 1,000 mg, oral, q8h CLARICE   Or  acetaminophen, 1,000 mg, g-tube, q8h CLARICE  ampicillin-sulbactam, 3 g, intravenous, q6h  aspirin, 325 mg, g-tube, Daily  atorvastatin, 40 mg, g-tube, Nightly  chlorhexidine, 15 mL, Mouth/Throat, Daily  chlorhexidine, 15 mL, Mouth/Throat, TID after meals  cholecalciferol, 50 mcg, g-tube, Daily  enoxaparin, 40 mg, subcutaneous, q24h  [Held by provider] furosemide, 60 mg, g-tube, Daily  hydrogen peroxide, 1 Application, Topical, TID  metoprolol tartrate, 6.25 mg, g-tube, BID  pantoprazole, 40 mg, intravenous, Daily  perflutren lipid microspheres, 0.5-10 mL of dilution, intravenous, Once in imaging  polyethylene glycol, 17 g, g-tube, BID  [Held by provider] sacubitriL-valsartan, 1 tablet, oral, BID  spironolactone, 12.5 mg, g-tube, Daily  sulfur hexafluoride microsphr, 2 mL, intravenous, Once in imaging  white petrolatum, 1 Application, Topical, TID     [2]    [3] PRN medications: albuterol, dextrose, dextrose, glucagon, glucagon, melatonin, naloxone, naloxone, ondansetron **OR** ondansetron, oxyCODONE, oxyCODONE

## 2025-07-20 NOTE — SIGNIFICANT EVENT
ENT Flap Check    Subjective:  Patient is doing well, he is resting in bed.  Tolerating his tube feeds well, no nausea.  Has not had a bowel movement but is passing gas.  Doppler staples were loose and so a Tegaderm was placed to reinforce.    Objective:  Vitals reviewed in EMR  Gen: NAD, AOx3, resting comfortably in bed  Face/Neck: All incisions c/d/i without evidence of dehiscence  -penroses (x3) with appropriate output after milking  -External doppler with strong biphasic arterial signal  Oral Cavity: All intraoral incisions c/d/i without evidence of dehiscence  -Flap soft and pink with good capillary refill, lateral area of bruising is stable from prior check  Extremities: Left arm warm with intact movement and sensation  -Left thigh skin graft donor site with tegaderm in place  Abdomen: PEG in place  Drains: All drains in place with serosanguinous drainage (stripped)    Assessment/Plan:  Shola Carbjaal is a 80 y.o. male who underwent PEG, left oral cavity composite resection, left ND level 1B, left radial forearm free flap on 7/17/2025. Flap check with lateral bruising - stable.     -Every 6 hours resident flap checks  -Plan per note from today      Aisha Washington MD - PGY2  Otolaryngology - Head & Neck Surgery    ENT Consult pager: [phone]  ENT Peds pager: [phone]  ENT Head & Neck Surgery Phone: [phone]  ENT Subspecialty team (otology, rhinology, laryngology, plastics, sleep):   M-F 6am-5pm- EpicChat or page the resident who wrote the daily note   Nights & weekends- [phone]  ENT Outpatient number: [phone]  Please Page [phone] or call [phone] if urgent    Some or all of this note was completed using dictation software, please contact the author of this note if there is any confusion.

## 2025-07-20 NOTE — SIGNIFICANT EVENT
ENT Flap Check    Subjective:  Patient has been having mild confusion.  States he is very sleepy.  Will try melatonin for patient, and encouraged team to have less disruptions to his sleep    Objective:  Vitals reviewed in EMR  Gen: NAD, AOx3, resting comfortably in bed  Face/Neck: All incisions c/d/i without evidence of dehiscence  -penroses (x3) with appropriate output after milking  -External doppler with strong biphasic arterial signal  Oral Cavity: All intraoral incisions c/d/i without evidence of dehiscence  -Flap soft and pink with good capillary refill, lateral area of bruising is stable from prior check  Extremities: Left arm warm with intact movement and sensation  -Left thigh skin graft donor site with tegaderm in place  Abdomen: PEG in place  Drains: All drains in place with serosanguinous drainage (stripped)    Assessment/Plan:  Shola Carbajal is a 80 y.o. male who underwent PEG, left oral cavity composite resection, left ND level 1B, left radial forearm free flap on 7/17/2025. Flap check with lateral bruising - stable.     -Every 6 hours resident flap checks  -Melatonin, follow-up sleeping  - Delirium precautions      Aisha Washington MD - PGY2  Otolaryngology - Head & Neck Surgery    ENT Consult pager: [phone]  ENT Peds pager: [phone]  ENT Head & Neck Surgery Phone: [phone]  ENT Subspecialty team (otology, rhinology, laryngology, plastics, sleep):   M-F 6am-5pm- EpicChat or page the resident who wrote the daily note   Nights & weekends- [phone]  ENT Outpatient number: [phone]  Please Page [phone] or call [phone] if urgent    Some or all of this note was completed using dictation software, please contact the author of this note if there is any confusion.

## 2025-07-20 NOTE — NURSING NOTE
ENT Note:    RN did not observe PT participating in any PEG/wound care. As given in report, patient became much angrier and threatened to leave multiple times, MD aware. RN observed PT removing self from bed and not remembering his PEG was attached. Pt was assigned a sitter to maintain safety. RN did not want to force anymore learning. Later wife came to bedside and said she wanted to speak with social work tomorrow 7/21. Wife did tell RN she might be able to do PEG/wound care, but still would like to speak with the team and social work.     THERON Stephen

## 2025-07-20 NOTE — PROGRESS NOTES
Subjective Data:  Advanced Heart Failure Progress Note   Consulting Team:  Primary Cardiologist:  Reason for Consult:    Interval events  Spironolactone was started as recommended  Today creatinine is down to 1.27  This a.m. patient was confused, had sitter at bedside        Plan  Patient appears euvolemic on exam  C/w spironolactone      Subjective   Shola Carbajal is a 80 y.o. 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  Heart failure team consulted for management of diuresis and management of medication resumption postsurgery.     Patient was originally evaluated for new cancer by his dentist after breaking a tooth while eating. He was then biopsied by an outside ENT and referred here for management. Had resection and reconstruction 7/17.     Objective   Physical Exam  Vitals:    07/20/25 0536   BP: 115/82   Pulse: 77   Resp: 16   Temp: 36.8 °C (98.2 °F)   SpO2: 94%       GEN: Healthy appearing, well-developed, NAD.  CV: RRR, no m/r/g. JVP   LUNGS: CTAB, no w/r/c.  ABD: Soft, NT/ND, NBS, no masses or organomegaly.  SKIN: Warm, well perfused. No skin rashes or abnormal lesions.  No LE edema   MSK: Normal gait. No deformities.  EXT: No clubbing, cyanosis, or edema.  NEURO: Ambulating with no limitations. No focal deficits.    I have personally reviewed the following images and laboratory findings:    ECG: normal sinus rhythm and PVC's are occasional and unifocal  .    Results for orders placed during the hospital encounter of 07/17/25    Transthoracic Echo Complete    Narrative  [facility], [address] [place] [address]  Tel [phone] and Fax [phone]    TRANSTHORACIC ECHOCARDIOGRAM REPORT      Patient Name:       SHOLA CARBAJAL        Reading Physician:    [id number] Tc Sidhu MD  Study Date:         7/18/2025           Ordering Provider:    [id number] AFTAB POLANCO  MRN/PID:            [id number]            Fellow:  Accession#:         [id number]        Nurse:  Date of Birth/Age:  1945 / 80 years Sonographer:          Parul Moody RDCS  Gender assigned at                     Additional Staff:  Birth:  Height:             177.80 cm           Admit Date:           7/17/2025  Weight:             76.66 kg            Admission Status:     Inpatient -  Routine  BSA / BMI:          1.94 m2 / 24.25     Encounter#:           [id number]  kg/m2  Blood Pressure:     106/65 mmHg         Department Location:  [place] [address]    Study Type:    TRANSTHORACIC ECHO (TTE) COMPLETE  Diagnosis/ICD: Chronic systolic (congestive) heart failure (CHF)-I50.22  Indication:    chronic systolic heart failure  CPT Code:      Echo Complete w Full Doppler-73781    Patient History:  Pertinent History: CAD s/p PCI, Ischemic cardiomyopathy, HFrEF s/p ICD, HTN,  JAE, COPD, Hx. of MI, PVD, Oral cancer, PVD.    Study Detail: The following Echo studies were performed: 2D, M-Mode, color flow  and Doppler. Technically challenging study due to postoperative  dressings and patient lying in supine position. Optison used as a  contrast agent for endocardial border definition. Total contrast  used for this procedure was 3 mL via IV push. Unable to obtain  suprasternal notch view.      PHYSICIAN INTERPRETATION:  Left Ventricle: The left ventricle was not well visualized. The left ventricular ejection fraction could not be measured. The left ventricular cavity size was not assessed. There is normal septal and normal posterior left ventricular wall thickness. Left ventricular diastolic filling was not assessed.  Left Atrium: The left atrial size was not well visualized.  Right Ventricle: The right ventricle is moderately enlarged. There is moderately reduced right ventricular systolic  function. A device is visualized in the right ventricle.  Right Atrium: The right atrial size was not well visualized. There is a device visualized in the right atrium.  Aortic Valve: The aortic valve was not well visualized. There is no evidence of aortic valve regurgitation.  Mitral Valve: The mitral valve was not well visualized. There is no evidence of mitral valve regurgitation.  Tricuspid Valve: The tricuspid valve was not well visualized. There is trace tricuspid regurgitation. The Doppler estimated right ventricular systolic pressure (RVSP) is mildly elevated at 41 mmHg. Reported right ventricular systolic pressure may be underestimated due to incomplete or suboptimal Doppler envelope.  Pulmonic Valve: The pulmonic valve was not assessed. Pulmonic valve regurgitation was not assessed.  Pericardium: There is no pericardial effusion noted.  Aorta: The aortic root was not well visualized.  Systemic Veins: The inferior vena cava appears dilated, with IVC inspiratory collapse less than 50%.  In comparison to the previous echocardiogram(s): There are no prior studies on this patient for comparison purposes.      CONCLUSIONS:  1. Poorly visualized anatomical structures due to suboptimal image quality.  2. The left ventricle was not well visualized despite the use of echocontrast. The left ventricular ejection fraction could not be measured. Consider alternative imaging.  3. There is moderately reduced right ventricular systolic function.  4. Moderately enlarged right ventricle.  5. The Doppler estimated RVSP is mildly elevated at 41 mmHg.    QUANTITATIVE DATA SUMMARY:    2D MEASUREMENTS:         Normal Ranges:  Ao Root d:       3.55 cm (2.0-3.7cm)  IVSd:            1.00 cm (0.6-1.1cm)  LVPWd:           0.90 cm (0.6-1.1cm)  LVIDd:           4.40 cm (3.9-5.9cm)  LVIDs:           3.60 cm  LV Mass Index:   71 g/m2  LV % FS          18.2 %      AORTIC VALVE:           Normal Ranges:  AoV Vmax:      1.05 m/s  "(<=1.7m/s)  AoV Peak P.4 mmHg (<20mmHg)  LVOT Max Mitch:  0.70 m/s (<=1.1m/s)  LVOT VTI:      13.40 cm  LVOT Diameter: 2.00 cm  (1.8-2.4cm)  AoV Area,Vmax: 2.11 cm2 (2.5-4.5cm2)      RIGHT VENTRICLE:  TAPSE: 16.0 mm  RV s'  0.06 m/s      TRICUSPID VALVE/RVSP:          Normal Ranges:  Peak TR Velocity:     2.56 m/s  Est. RA Pressure:     15  RV Syst Pressure:     41       (< 30mmHg)  IVC Diam:             2.90 cm      PULMONIC VALVE:          Normal Ranges:  PV Accel Time:  99 msec  (>120ms)  PV Max Mitch:     0.8 m/s  (0.6-0.9m/s)  PV Max P.7 mmHg      [id number] Tc Sidhu MD  Electronically signed on 2025 at 5:21:28 PM        ** Final **       Imaging  Chest x-ray: normal      Lab Review   CMP:  Recent Labs     25  0625  0619 07/17/25  2213 07/10/25  1050    140 140 138   K 4.0 4.6 4.4 4.3    105 104 103   CO2 28 26 25 28   ANIONGAP 11 14 15 11   BUN 29* 28* 27* 20   CREATININE 1.33* 1.43* 1.44* 1.27   EGFR 55* 50* 50* 58*   MG 2.30 1.95 1.86 2.00     Recent Labs     25  0613 25  0625   ALBUMIN 3.6 3.8 3.9     CBC:  Recent Labs     25  0613 25  0619 07/17/25  2213 07/10/25  1050   WBC 11.2 14.1* 16.9* 8.2   HGB 8.5* 8.9* 9.6* 14.5   HCT 26.1* 27.5* 30.1* 44.0   * 148* 148* 202   MCV 98 98 101* 99     COAG: No results for input(s): \"PTT\", \"INR\", \"ARIXTRA\", \"HAUF\", \"DDIMERVTE\", \"HAPTOGLOBIN\", \"FIBRINOGEN\" in the last 95106 hours.  ABO:   Recent Labs     25  0616   ABO O     HEME/ENDO:   Recent Labs     07/10/25  1050 23  1354   TSH 1.90  --    HGBA1C  --  5.4     CARDIAC:   Recent Labs     25  0613   *     No results for input(s): \"CHOL\", \"LDLF\", \"LDLCALC\", \"HDL\", \"TRIG\" in the last 46663 hours.  TOX:No results for input(s): \"AMPHETAMINE\", \"BARBSCRNUR\", \"BENZO\", \"CANNABINOID\", \"COCAI\", \"FENTANYL\", \"OPIATE\", \"OXYCODONE\", \"PCP\" in the last 73008 hours.  MICRO: No results for input(s): \"ESR\", \"CRP\", \"PROCAL\" in " the last 35769 hours.  No results found for the last 90 days.       BNP   Date/Time Value Ref Range Status   07/19/2025 06:13  (H) 0 - 99 pg/mL Final        Assessment and Plan     Shola Carbajal is a 80 y.o. 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  Heart failure team consulted for management of diuresis and management of medication resumption postsurgery.     Chronic HFrEF (NYHA Class II/AHA Stage C)  Patient presented for recent surgery.  In lieu of that spironolactone, furosemide, and Entresto were held.  No echo seen in hospital records.  Per prior notes EF is 30 to 35% most recently in 2023.  Medications patient takes at home include aspirin 81 mg, atorvastatin 40, furosemide 60 mg daily, metoprolol 25 mg, Entresto 24-26 mg, and spironolactone 12.5 mg.  Right now patient is currently on metoprolol 6.5 mg twice daily.  - Echo was done EF cannot be estimated. No need to repeat echo as it will not   - Diuresis: Patient appears to be euvolemic on exam  - GDMT              - SGLT-2i: Not on any due to history of penile rash.              - Beta-blocker: Continue with metoprolol 6.25 twice daily which is currently used because patient has a PEG and succinate cannot be crushed.              - ACE-I/ARB/ARNI: Continue to hold Entresto in the setting of KULWANT.                - MRA: C/w spironolactone 12.5 mg              - Vasodilators: No vasodilators  - Inotropes: None  - RV support: None  - Mechanical support: None  - AICD/CRT-D/Lifevest: ICD placed 2023  - Strict I/Os, Daily Na < 2g daily, fluid restriction 2 L daily     For his A-fib patient did not tolerate Plavix in the past due to GI bleed.  Unsure if he will tolerate any oral  anticoagulant.     Will plan on resuming medications as patient gradually improves during his hospital stay. Can give free water boluses.      Thank you for the opportunity to involve us in the care of this fine individual. This plan was discussed with Dr Aguirre, HF attending. For any questions or concerns, feel free to reach out via [employer] chat or [place] at [phone].    Lexi Ness MD   Heart Failure Fellow  PGY-4         Volume status appears stable, he is off oxygen, would continue current medications, can wait to add back ANRI/furosemide, his echocardiogram was difficult to interpret but LVEF is likely near normal, regardless, this would not  much at the current time    I saw and evaluated the patient. I personally obtained the key and critical portions of the history and physical exam or was physically present for key and critical portions performed by the resident/fellow. I reviewed the resident/fellow's documentation and discussed the patient with the resident/fellow. I agree with the resident/fellow's medical decision making as documented in the note.

## 2025-07-21 ENCOUNTER — OFFICE VISIT (OUTPATIENT)
Dept: SURGICAL ONCOLOGY | Facility: HOSPITAL | Age: 80
DRG: 012 | End: 2025-07-21
Payer: MEDICARE

## 2025-07-21 DIAGNOSIS — C06.9 CANCER OF ORAL CAVITY (MULTI): ICD-10-CM

## 2025-07-21 LAB
ALBUMIN SERPL BCP-MCNC: 3.6 G/DL (ref 3.4–5)
ANION GAP SERPL CALC-SCNC: 10 MMOL/L (ref 10–20)
BUN SERPL-MCNC: 34 MG/DL (ref 6–23)
CALCIUM SERPL-MCNC: 9.9 MG/DL (ref 8.6–10.6)
CHLORIDE SERPL-SCNC: 108 MMOL/L (ref 98–107)
CO2 SERPL-SCNC: 28 MMOL/L (ref 21–32)
CREAT SERPL-MCNC: 1.24 MG/DL (ref 0.5–1.3)
EGFRCR SERPLBLD CKD-EPI 2021: 60 ML/MIN/1.73M*2
ERYTHROCYTE [DISTWIDTH] IN BLOOD BY AUTOMATED COUNT: 14.6 % (ref 11.5–14.5)
GLUCOSE SERPL-MCNC: 130 MG/DL (ref 74–99)
HCT VFR BLD AUTO: 25.8 % (ref 41–52)
HGB BLD-MCNC: 7.8 G/DL (ref 13.5–17.5)
MAGNESIUM SERPL-MCNC: 2.61 MG/DL (ref 1.6–2.4)
MCH RBC QN AUTO: 32 PG (ref 26–34)
MCHC RBC AUTO-ENTMCNC: 30.2 G/DL (ref 32–36)
MCV RBC AUTO: 106 FL (ref 80–100)
NRBC BLD-RTO: 0 /100 WBCS (ref 0–0)
PHOSPHATE SERPL-MCNC: 2.1 MG/DL (ref 2.5–4.9)
PLATELET # BLD AUTO: 143 X10*3/UL (ref 150–450)
POTASSIUM SERPL-SCNC: 4.4 MMOL/L (ref 3.5–5.3)
RBC # BLD AUTO: 2.44 X10*6/UL (ref 4.5–5.9)
SODIUM SERPL-SCNC: 142 MMOL/L (ref 136–145)
WBC # BLD AUTO: 8.9 X10*3/UL (ref 4.4–11.3)

## 2025-07-21 PROCEDURE — 80069 RENAL FUNCTION PANEL: CPT

## 2025-07-21 PROCEDURE — 83735 ASSAY OF MAGNESIUM: CPT

## 2025-07-21 PROCEDURE — 99024 POSTOP FOLLOW-UP VISIT: CPT | Performed by: OTOLARYNGOLOGY

## 2025-07-21 PROCEDURE — 1170000001 HC PRIVATE ONCOLOGY ROOM DAILY

## 2025-07-21 PROCEDURE — 36415 COLL VENOUS BLD VENIPUNCTURE: CPT

## 2025-07-21 PROCEDURE — 2500000001 HC RX 250 WO HCPCS SELF ADMINISTERED DRUGS (ALT 637 FOR MEDICARE OP)

## 2025-07-21 PROCEDURE — 2500000004 HC RX 250 GENERAL PHARMACY W/ HCPCS (ALT 636 FOR OP/ED): Performed by: STUDENT IN AN ORGANIZED HEALTH CARE EDUCATION/TRAINING PROGRAM

## 2025-07-21 PROCEDURE — 93005 ELECTROCARDIOGRAM TRACING: CPT

## 2025-07-21 PROCEDURE — 2500000002 HC RX 250 W HCPCS SELF ADMINISTERED DRUGS (ALT 637 FOR MEDICARE OP, ALT 636 FOR OP/ED): Performed by: STUDENT IN AN ORGANIZED HEALTH CARE EDUCATION/TRAINING PROGRAM

## 2025-07-21 PROCEDURE — 93010 ELECTROCARDIOGRAM REPORT: CPT | Performed by: INTERNAL MEDICINE

## 2025-07-21 PROCEDURE — 99232 SBSQ HOSP IP/OBS MODERATE 35: CPT | Performed by: STUDENT IN AN ORGANIZED HEALTH CARE EDUCATION/TRAINING PROGRAM

## 2025-07-21 PROCEDURE — 97165 OT EVAL LOW COMPLEX 30 MIN: CPT | Mod: GO

## 2025-07-21 PROCEDURE — 85027 COMPLETE CBC AUTOMATED: CPT

## 2025-07-21 PROCEDURE — 2500000001 HC RX 250 WO HCPCS SELF ADMINISTERED DRUGS (ALT 637 FOR MEDICARE OP): Performed by: STUDENT IN AN ORGANIZED HEALTH CARE EDUCATION/TRAINING PROGRAM

## 2025-07-21 RX ORDER — SODIUM,POTASSIUM PHOSPHATES 280-250MG
1 POWDER IN PACKET (EA) ORAL 4 TIMES DAILY
Status: COMPLETED | OUTPATIENT
Start: 2025-07-21 | End: 2025-07-21

## 2025-07-21 RX ORDER — CARVEDILOL 6.25 MG/1
3.12 TABLET ORAL 2 TIMES DAILY
Status: DISCONTINUED | OUTPATIENT
Start: 2025-07-21 | End: 2025-07-22 | Stop reason: HOSPADM

## 2025-07-21 RX ADMIN — ACETAMINOPHEN 1000 MG: 160 SOLUTION ORAL at 13:04

## 2025-07-21 RX ADMIN — Medication 1 APPLICATION: at 21:26

## 2025-07-21 RX ADMIN — ATORVASTATIN CALCIUM 40 MG: 40 TABLET, FILM COATED ORAL at 21:23

## 2025-07-21 RX ADMIN — ACETAMINOPHEN 1000 MG: 160 SOLUTION ORAL at 05:14

## 2025-07-21 RX ADMIN — POTASSIUM & SODIUM PHOSPHATES POWDER PACK 280-160-250 MG 1 PACKET: 280-160-250 PACK at 17:37

## 2025-07-21 RX ADMIN — ALBUTEROL SULFATE 2 PUFF: 90 AEROSOL, METERED RESPIRATORY (INHALATION) at 21:43

## 2025-07-21 RX ADMIN — CARVEDILOL 3.12 MG: 6.25 TABLET, FILM COATED ORAL at 21:23

## 2025-07-21 RX ADMIN — ASPIRIN 325 MG ORAL TABLET 325 MG: 325 PILL ORAL at 08:59

## 2025-07-21 RX ADMIN — POTASSIUM & SODIUM PHOSPHATES POWDER PACK 280-160-250 MG 1 PACKET: 280-160-250 PACK at 05:14

## 2025-07-21 RX ADMIN — CHLORHEXIDINE GLUCONATE 0.12% ORAL RINSE 15 ML: 1.2 LIQUID ORAL at 08:59

## 2025-07-21 RX ADMIN — Medication 1 APPLICATION: at 15:13

## 2025-07-21 RX ADMIN — SPIRONOLACTONE 12.5 MG: 25 TABLET, FILM COATED ORAL at 09:00

## 2025-07-21 RX ADMIN — CHLORHEXIDINE GLUCONATE 0.12% ORAL RINSE 15 ML: 1.2 LIQUID ORAL at 13:04

## 2025-07-21 RX ADMIN — HYDROGEN PEROXIDE 1 APPLICATION: 30 SOLUTION TOPICAL at 09:00

## 2025-07-21 RX ADMIN — ACETAMINOPHEN 1000 MG: 160 SOLUTION ORAL at 21:23

## 2025-07-21 RX ADMIN — SACUBITRIL AND VALSARTAN 1 TABLET: 24; 26 TABLET, FILM COATED ORAL at 21:23

## 2025-07-21 RX ADMIN — METOPROLOL TARTRATE 6.25 MG: 25 TABLET, FILM COATED ORAL at 09:00

## 2025-07-21 RX ADMIN — Medication 50 MCG: at 08:59

## 2025-07-21 RX ADMIN — ENOXAPARIN SODIUM 40 MG: 100 INJECTION SUBCUTANEOUS at 10:54

## 2025-07-21 RX ADMIN — HYDROGEN PEROXIDE 1 APPLICATION: 30 SOLUTION TOPICAL at 15:13

## 2025-07-21 RX ADMIN — HYDROGEN PEROXIDE 1 APPLICATION: 30 SOLUTION TOPICAL at 21:26

## 2025-07-21 RX ADMIN — AMPICILLIN SODIUM AND SULBACTAM SODIUM 3 G: 2; 1 INJECTION, POWDER, FOR SOLUTION INTRAMUSCULAR; INTRAVENOUS at 05:15

## 2025-07-21 RX ADMIN — CHLORHEXIDINE GLUCONATE 0.12% ORAL RINSE 15 ML: 1.2 LIQUID ORAL at 21:23

## 2025-07-21 RX ADMIN — PANTOPRAZOLE SODIUM 40 MG: 40 INJECTION, POWDER, LYOPHILIZED, FOR SOLUTION INTRAVENOUS at 08:59

## 2025-07-21 RX ADMIN — AMPICILLIN SODIUM AND SULBACTAM SODIUM 3 G: 2; 1 INJECTION, POWDER, FOR SOLUTION INTRAMUSCULAR; INTRAVENOUS at 17:38

## 2025-07-21 RX ADMIN — Medication 1 APPLICATION: at 08:59

## 2025-07-21 RX ADMIN — POTASSIUM & SODIUM PHOSPHATES POWDER PACK 280-160-250 MG 1 PACKET: 280-160-250 PACK at 13:04

## 2025-07-21 RX ADMIN — AMPICILLIN SODIUM AND SULBACTAM SODIUM 3 G: 2; 1 INJECTION, POWDER, FOR SOLUTION INTRAMUSCULAR; INTRAVENOUS at 10:54

## 2025-07-21 ASSESSMENT — PAIN SCALES - GENERAL
PAINLEVEL_OUTOF10: 0 - NO PAIN

## 2025-07-21 ASSESSMENT — PAIN - FUNCTIONAL ASSESSMENT
PAIN_FUNCTIONAL_ASSESSMENT: 0-10

## 2025-07-21 ASSESSMENT — COGNITIVE AND FUNCTIONAL STATUS - GENERAL
PERSONAL GROOMING: A LITTLE
DRESSING REGULAR LOWER BODY CLOTHING: A LITTLE
DAILY ACTIVITIY SCORE: 21
DRESSING REGULAR UPPER BODY CLOTHING: A LITTLE
HELP NEEDED FOR BATHING: A LITTLE
MOBILITY SCORE: 22
HELP NEEDED FOR BATHING: A LITTLE
DRESSING REGULAR LOWER BODY CLOTHING: A LITTLE
CLIMB 3 TO 5 STEPS WITH RAILING: A LITTLE
EATING MEALS: A LITTLE
TOILETING: A LITTLE
WALKING IN HOSPITAL ROOM: A LITTLE
DAILY ACTIVITIY SCORE: 19

## 2025-07-21 ASSESSMENT — ACTIVITIES OF DAILY LIVING (ADL)
ADL_ASSISTANCE: INDEPENDENT
BATHING_ASSISTANCE: STAND BY

## 2025-07-21 NOTE — SIGNIFICANT EVENT
ENT Flap Check    Subjective:  Patient noted to have bruising along the neck incision line that was previously unnoticed.  No significant fluctuance appreciated and no crossing over suture lines.  No overlying skin changes to be concern for hematoma    Objective:  Vitals reviewed in EMR  Gen: NAD, AOx3, resting comfortably in bed  Face/Neck: All incisions c/d/i without evidence of dehiscence -- new supraincisional bruising appreciated, no fluctuance no overlying skin changes and no tenderness to palpation  -penroses (x3) with appropriate output after milking  -External doppler with strong biphasic arterial signal  Oral Cavity: All intraoral incisions c/d/i without evidence of dehiscence  -Flap soft and pink with good capillary refill, lateral area of bruising is stable from prior check  Extremities: Left arm warm with intact movement and sensation  -Left thigh skin graft donor site with tegaderm in place  Abdomen: PEG in place  Drains: All drains in place with serosanguinous drainage (stripped)    Assessment/Plan:  Shola Carbajal is a 80 y.o. male who underwent PEG, left oral cavity composite resection, left ND level 1B, left radial forearm free flap on 7/17/2025. Flap check with lateral bruising - stable.  Newfound bruising along neck incision line.     -Every 6 hours resident flap checks  - Delirium precautions  - Continue to monitor intraoral bruising as well as bruising along neck incision line       Giovanny Davis MD - PGY2  Otolaryngology - Head and Neck Surgery  Head & Neck team phone: [phone]  ENT consult pager: [phone]   Pediatric ENT pager: [phone]  ENT Outpatient scheduling number: [phone]

## 2025-07-21 NOTE — PROGRESS NOTES
Dr. Chacon left a message asking that Mr. Carbajal be added to the head and neck tumor board.  I hopes that pathology would be available, he's been added to the 8/1/25 tumor board.

## 2025-07-21 NOTE — PROGRESS NOTES
Subjective Data:  Advanced Heart Failure Progress Note   Consulting Team:  Primary Cardiologist:  Reason for Consult:    Interval events  Creatinine continues to improve despite addition of spironolactone  BP rising in the 130s to 150s        Plan  Patient appears euvolemic on exam  Recommend starting home Entresto  Switch Metoprolol to carvedilol 3.25 BID       Subjective   Shola Carbajal is a 80 y.o. 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  Heart failure team consulted for management of diuresis and management of medication resumption postsurgery.     Patient was originally evaluated for new cancer by his dentist after breaking a tooth while eating. He was then biopsied by an outside ENT and referred here for management. Had resection and reconstruction 7/17.     Objective   Physical Exam  Vitals:    07/21/25 0858   BP: 103/55   Pulse: 82   Resp: 18   Temp: 36.3 °C (97.3 °F)   SpO2: 95%       GEN: Healthy appearing, well-developed, NAD.  CV: RRR, no m/r/g. JVP   LUNGS: CTAB, no w/r/c.  ABD: Soft, NT/ND, NBS, no masses or organomegaly.  SKIN: Warm, well perfused. No skin rashes or abnormal lesions.  No LE edema   MSK: Normal gait. No deformities.  EXT: No clubbing, cyanosis, or edema.  NEURO: Ambulating with no limitations. No focal deficits.    I have personally reviewed the following images and laboratory findings:    ECG: normal sinus rhythm and PVC's are occasional and unifocal  .    Results for orders placed during the hospital encounter of 07/17/25    Transthoracic Echo Complete    Narrative  [facility], [address] [place] [address]  Tel [phone] and Fax [phone]    TRANSTHORACIC ECHOCARDIOGRAM REPORT      Patient  Name:       JANNET Dinh Physician:    [id number] Tc Sidhu MD  Study Date:         7/18/2025           Ordering Provider:    [id number] AFTAB POLANCO  MRN/PID:            [id number]            Fellow:  Accession#:         [id number]        Nurse:  Date of Birth/Age:  1945 / 80 years Sonographer:          Parul Moody RDCS  Gender assigned at  M                   Additional Staff:  Birth:  Height:             177.80 cm           Admit Date:           7/17/2025  Weight:             76.66 kg            Admission Status:     Inpatient -  Routine  BSA / BMI:          1.94 m2 / 24.25     Encounter#:           [id number]  kg/m2  Blood Pressure:     106/65 mmHg         Department Location:  [place] [address]    Study Type:    TRANSTHORACIC ECHO (TTE) COMPLETE  Diagnosis/ICD: Chronic systolic (congestive) heart failure (CHF)-I50.22  Indication:    chronic systolic heart failure  CPT Code:      Echo Complete w Full Doppler-87555    Patient History:  Pertinent History: CAD s/p PCI, Ischemic cardiomyopathy, HFrEF s/p ICD, HTN,  JAE, COPD, Hx. of MI, PVD, Oral cancer, PVD.    Study Detail: The following Echo studies were performed: 2D, M-Mode, color flow  and Doppler. Technically challenging study due to postoperative  dressings and patient lying in supine position. Optison used as a  contrast agent for endocardial border definition. Total contrast  used for this procedure was 3 mL via IV push. Unable to obtain  suprasternal notch view.      PHYSICIAN INTERPRETATION:  Left Ventricle: The left ventricle was not well visualized. The left ventricular ejection fraction could not be measured. The left ventricular cavity size was not assessed. There is normal septal and normal posterior left ventricular wall thickness. Left ventricular diastolic filling was not assessed.  Left Atrium: The left atrial size was not well visualized.  Right Ventricle: The right ventricle is moderately enlarged. There is moderately reduced right  ventricular systolic function. A device is visualized in the right ventricle.  Right Atrium: The right atrial size was not well visualized. There is a device visualized in the right atrium.  Aortic Valve: The aortic valve was not well visualized. There is no evidence of aortic valve regurgitation.  Mitral Valve: The mitral valve was not well visualized. There is no evidence of mitral valve regurgitation.  Tricuspid Valve: The tricuspid valve was not well visualized. There is trace tricuspid regurgitation. The Doppler estimated right ventricular systolic pressure (RVSP) is mildly elevated at 41 mmHg. Reported right ventricular systolic pressure may be underestimated due to incomplete or suboptimal Doppler envelope.  Pulmonic Valve: The pulmonic valve was not assessed. Pulmonic valve regurgitation was not assessed.  Pericardium: There is no pericardial effusion noted.  Aorta: The aortic root was not well visualized.  Systemic Veins: The inferior vena cava appears dilated, with IVC inspiratory collapse less than 50%.  In comparison to the previous echocardiogram(s): There are no prior studies on this patient for comparison purposes.      CONCLUSIONS:  1. Poorly visualized anatomical structures due to suboptimal image quality.  2. The left ventricle was not well visualized despite the use of echocontrast. The left ventricular ejection fraction could not be measured. Consider alternative imaging.  3. There is moderately reduced right ventricular systolic function.  4. Moderately enlarged right ventricle.  5. The Doppler estimated RVSP is mildly elevated at 41 mmHg.    QUANTITATIVE DATA SUMMARY:    2D MEASUREMENTS:         Normal Ranges:  Ao Root d:       3.55 cm (2.0-3.7cm)  IVSd:            1.00 cm (0.6-1.1cm)  LVPWd:           0.90 cm (0.6-1.1cm)  LVIDd:           4.40 cm (3.9-5.9cm)  LVIDs:           3.60 cm  LV Mass Index:   71 g/m2  LV % FS          18.2 %      AORTIC VALVE:           Normal Ranges:  AoV Vmax:       "1.05 m/s (<=1.7m/s)  AoV Peak P.4 mmHg (<20mmHg)  LVOT Max Mitch:  0.70 m/s (<=1.1m/s)  LVOT VTI:      13.40 cm  LVOT Diameter: 2.00 cm  (1.8-2.4cm)  AoV Area,Vmax: 2.11 cm2 (2.5-4.5cm2)      RIGHT VENTRICLE:  TAPSE: 16.0 mm  RV s'  0.06 m/s      TRICUSPID VALVE/RVSP:          Normal Ranges:  Peak TR Velocity:     2.56 m/s  Est. RA Pressure:     15  RV Syst Pressure:     41       (< 30mmHg)  IVC Diam:             2.90 cm      PULMONIC VALVE:          Normal Ranges:  PV Accel Time:  99 msec  (>120ms)  PV Max Mitch:     0.8 m/s  (0.6-0.9m/s)  PV Max P.7 mmHg      [id number] Tc Sidhu MD  Electronically signed on 2025 at 5:21:28 PM        ** Final **       Imaging  Chest x-ray: normal      Lab Review   CMP:  Recent Labs     25  0609 25  1039 25  0613 07/18/25  0619 07/17/25  2213 07/10/25  1050    141 140 140 140 138   K 4.4 4.1 4.0 4.6 4.4 4.3   * 105 105 105 104 103   CO2 28 30 28 26 25 28   ANIONGAP 10 10 11 14 15 11   BUN 34* 30* 29* 28* 27* 20   CREATININE 1.24 1.27 1.33* 1.43* 1.44* 1.27   EGFR 60* 58* 55* 50* 50* 58*   MG 2.61* 2.56* 2.30 1.95 1.86 2.00     Recent Labs     25  0609 25  1039 25  0613 25  0625   ALBUMIN 3.6 3.5 3.6 3.8 3.9     CBC:  Recent Labs     25  0609 25  1039 25  0613 25  0619 07/17/25  2213 07/10/25  1050   WBC 8.9 9.7 11.2 14.1* 16.9* 8.2   HGB 7.8* 7.9* 8.5* 8.9* 9.6* 14.5   HCT 25.8* 23.7* 26.1* 27.5* 30.1* 44.0   * 150 132* 148* 148* 202   * 96 98 98 101* 99     COAG: No results for input(s): \"PTT\", \"INR\", \"ARIXTRA\", \"HAUF\", \"DDIMERVTE\", \"HAPTOGLOBIN\", \"FIBRINOGEN\" in the last 58710 hours.  ABO:   Recent Labs     25  0616   ABO O     HEME/ENDO:   Recent Labs     07/10/25  1050 23  1354   TSH 1.90  --    HGBA1C  --  5.4     CARDIAC:   Recent Labs     25  0613   *     No results for input(s): \"CHOL\", \"LDLF\", \"LDLCALC\", \"HDL\", \"TRIG\" in the last " "91156 hours.  TOX:No results for input(s): \"AMPHETAMINE\", \"BARBSCRNUR\", \"BENZO\", \"CANNABINOID\", \"COCAI\", \"FENTANYL\", \"OPIATE\", \"OXYCODONE\", \"PCP\" in the last 11695 hours.  MICRO: No results for input(s): \"ESR\", \"CRP\", \"PROCAL\" in the last 93861 hours.  No results found for the last 90 days.       BNP   Date/Time Value Ref Range Status   07/19/2025 06:13  (H) 0 - 99 pg/mL Final        Assessment and Plan     Shola Carbajal is a 80 y.o. 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  Heart failure team consulted for management of diuresis and management of medication resumption postsurgery.     Chronic HFrEF (NYHA Class II/AHA Stage C)  Patient presented for recent surgery.  In lieu of that spironolactone, furosemide, and Entresto were held.  No echo seen in hospital records.  Per prior notes EF is 30 to 35% most recently in 2023.  Medications patient takes at home include aspirin 81 mg, atorvastatin 40, furosemide 60 mg daily, metoprolol 25 mg, Entresto 24-26 mg, and spironolactone 12.5 mg.  Right now patient is currently on metoprolol 6.5 mg twice daily.  - Echo was done EF cannot be estimated. No need to repeat echo as it will not   - Diuresis: Patient appears to be euvolemic on exam, can Diurese as needed, no need to diurese today   - GDMT              - SGLT-2i: Not on any due to history of penile rash.              - Beta-blocker: Please switch metoprolol to carvedilol 3.125 BID               - ACE-I/ARB/ARNI: Start home Entresto 24-26 mg twice daily              - MRA: C/w spironolactone 12.5 mg              - Vasodilators: No vasodilators  - Inotropes: None  - RV support: None  - Mechanical support: None  - AICD/CRT-D/Lifevest: ICD " placed 2023  - Strict I/Os, Daily Na < 2g daily, fluid restriction 2 L daily     For his A-fib patient did not tolerate Plavix in the past due to GI bleed.  Unsure if he will tolerate any oral anticoagulant.     Will plan on resuming medications as patient gradually improves during his hospital stay. Can give free water boluses.      Thank you for the opportunity to involve us in the care of this fine individual. This plan was discussed with Dr Montejo, HF attending. For any questions or concerns, feel free to reach out via [employer] chat or page at [phone].    HF team to sign off.     Lexi Ness MD   Heart Failure Fellow  PGY-4

## 2025-07-21 NOTE — SIGNIFICANT EVENT
ENT Flap Check    Subjective:  Patient was initially requesting to leave the hospital; however, after some redirection is amenable to staying    Objective:  Vitals reviewed in EMR  Gen: NAD, AOx3, resting comfortably in bed  Face/Neck: All incisions c/d/i without evidence of dehiscence  -penroses (x3) with appropriate output after milking  -External doppler with strong biphasic arterial signal  Oral Cavity: All intraoral incisions c/d/i without evidence of dehiscence  -Flap soft and pink with good capillary refill, lateral area of bruising is stable from prior check  Extremities: Left arm warm with intact movement and sensation  -Left thigh skin graft donor site with tegaderm in place  Abdomen: PEG in place  Drains: All drains in place with serosanguinous drainage (stripped)    Assessment/Plan:  Shola Carbajal is a 80 y.o. male who underwent PEG, left oral cavity composite resection, left ND level 1B, left radial forearm free flap on 7/17/2025. Flap check with lateral bruising - stable.     -Every 6 hours resident flap checks  - Delirium precautions      Giovanny Davis MD - PGY2  Otolaryngology - Head and Neck Surgery  Head & Neck team phone: [phone]  ENT consult pager: [phone]   Pediatric ENT pager: [phone]  ENT Outpatient scheduling number: [phone]

## 2025-07-21 NOTE — CARE PLAN
The patient's goals for the shift include      The clinical goals for the shift include remain safe and free from injury      Problem: Heart Failure  Goal: Improved gas exchange this shift  Outcome: Progressing  Goal: Improved urinary output this shift  Outcome: Progressing  Goal: Reduction in peripheral edema within 24 hours  Outcome: Progressing  Goal: Report improvement of dyspnea/breathlessness this shift  Outcome: Progressing  Goal: Weight from fluid excess reduced over 2-3 days, then stabilize  Outcome: Progressing  Goal: Increase self care and/or family involvement in 24 hours  Outcome: Progressing     Problem: Pain - Adult  Goal: Verbalizes/displays adequate comfort level or baseline comfort level  Outcome: Progressing     Problem: Safety - Adult  Goal: Free from fall injury  Outcome: Progressing     Problem: Discharge Planning  Goal: Discharge to home or other facility with appropriate resources  Outcome: Progressing     Problem: Chronic Conditions and Co-morbidities  Goal: Patient's chronic conditions and co-morbidity symptoms are monitored and maintained or improved  Outcome: Progressing     Problem: Nutrition  Goal: Nutrient intake appropriate for maintaining nutritional needs  Outcome: Progressing

## 2025-07-21 NOTE — PROGRESS NOTES
Otolaryngology - Head and Neck Surgery Progress Note             Shola Carbajal is a 80 y.o. male on day 4 of admission presenting with Cancer of oral cavity (Multi). Overnight patient was disruptive and required 1:1 sitter, Flap Checks stable    Subjective   Upset and wants to go home    Addendum:  Code violet called. Patient had reportedly turned over his FWW over frustration that he couldn't be allowed to ambulate to bathroom and asisstance was taking too long to arrive. He stated that he wants to leave. We discussed reasons for current admission. He appreciated these reasons and agreed to stay. He is mostly upset that when he needs to use the restroom, assistance does not arrive fast enough and he is concerned he is will void himself and defecate on himself. I counseled the patient on the risk of falls which he appreciated. Given these risks, we are permiting unsupervised ambulation to the bathroom and back provided he doesn't have an acute change in metnal status.    Objective   Vitals reviewed in EMR  Gen: NAD, AOx3, resting inbed  Eyes: EOMI, sclera clear, PERRL  Ears: Normal external ears bilaterally  Nose: No rhinorrhea; anterior nares clear  Oral Cavity: All intraoral incisions c/d/i without evidence of dehiscence  -Flap with one spot of bruising - stable lateral area of bruising  Head: normocephalic, atraumatic  Face/Neck:  All incisions c/d/i, neck soft and flat without evidence of hematoma or fluid collection, penroses (x3) with appropriate output after milking - not much coming out with milking  -External doppler with strong biphasic arterial signal  Resp: Non-labored breathing on RA  Cards: Irregular on Doppler  Gastro: Soft, non-distended,  PEG tube in place   : Dai catheter not in place.  MSK: Left arm warm with intact movement and sensation  - thigh skin graft donor site with tegaderm in place  -moves all extremities  Psych: Appropriate affect  Drains: All drains in place and holding suction  "with serosanguinous drainage (stripped)    Last Recorded Vitals  Blood pressure 103/55, pulse 82, temperature 36.3 °C (97.3 °F), temperature source Temporal, resp. rate 18, height 1.778 m (5' 10\"), weight 76.9 kg (169 lb 8.5 oz), SpO2 95%.  Intake/Output last 3 Shifts:  I/O last 3 completed shifts:  In: 1760 (22.9 mL/kg) [NG/GT:1560; IV Piggyback:200]  Out: 127 (1.7 mL/kg) [Urine:100 (0 mL/kg/hr); Drains:27]  Weight: 76.9 kg     Relevant Results  Results for orders placed or performed during the hospital encounter of 07/17/25 (from the past 24 hours)   CBC   Result Value Ref Range    WBC 8.9 4.4 - 11.3 x10*3/uL    nRBC 0.0 0.0 - 0.0 /100 WBCs    RBC 2.44 (L) 4.50 - 5.90 x10*6/uL    Hemoglobin 7.8 (L) 13.5 - 17.5 g/dL    Hematocrit 25.8 (L) 41.0 - 52.0 %     (H) 80 - 100 fL    MCH 32.0 26.0 - 34.0 pg    MCHC 30.2 (L) 32.0 - 36.0 g/dL    RDW 14.6 (H) 11.5 - 14.5 %    Platelets 143 (L) 150 - 450 x10*3/uL   Renal Function Panel   Result Value Ref Range    Glucose 130 (H) 74 - 99 mg/dL    Sodium 142 136 - 145 mmol/L    Potassium 4.4 3.5 - 5.3 mmol/L    Chloride 108 (H) 98 - 107 mmol/L    Bicarbonate 28 21 - 32 mmol/L    Anion Gap 10 10 - 20 mmol/L    Urea Nitrogen 34 (H) 6 - 23 mg/dL    Creatinine 1.24 0.50 - 1.30 mg/dL    eGFR 60 (L) >60 mL/min/1.73m*2    Calcium 9.9 8.6 - 10.6 mg/dL    Phosphorus 2.1 (L) 2.5 - 4.9 mg/dL    Albumin 3.6 3.4 - 5.0 g/dL   Magnesium   Result Value Ref Range    Magnesium 2.61 (H) 1.60 - 2.40 mg/dL        No results found.       Scheduled medications  Scheduled Medications[1]  Continuous medications  Continuous Medications[2]  PRN medications  PRN Medications[3]            This patient no longer has urinary catherter as it was not functioning properly. Patient urinated on floor overnight. Explained he can call for nurse to go to bathroom or use urinal.                Assessment & Plan  Cancer of oral cavity (Multi)    HTN (hypertension)    CAD (coronary artery disease)    Stented " coronary artery    MI (myocardial infarction) (Multi)    CHF (congestive heart failure)    AICD (automatic cardioverter/defibrillator) present    Chronic obstructive pulmonary disease (Multi)    GI bleed    Hypothyroidism    Peripheral vascular disease    JAE (obstructive sleep apnea)    Gastroesophageal reflux disease    Pulmonary nodules    Assessment:  Shola Carbajal is a 80 y.o. male who underwent PEG, left oral cavity composite resection, left ND level 1B, left radial forearm free flap on 7/17/2025  by Dr. Chacon and Dr. Thomas.    Active Issues:  Oral cavity SCC  Acute blood loss anemia  HTN  HLD  CAD  CHF w/ EF of 30-35%  COPD  Hypothyroidism  CKD stage IIIa  PVD  JAE  GERD  Pulmonary nodules  Tobacco dependence  PTSD  Essential tremors  History of GI bleed 2013      Plan:  No more AM labs  -Flap Checks:  -q12hrs per ENT resident team; nursing per free flap pathway  -Drains:   -Monitor output, remove arm drain and medial penrose  -Analgesia:   -Dilaudid PCA,discontinued  -PRN oxycodone  -Scheduled Acetaminophen  -FEN:     -TF advancing to goal.  -Monitor and replete electrolytes as needed  -Pulm:   -wean oxygen to room air  -Incentive Spirometer 10x/hr while awake  -CXR today  -ID:   -Unasyn 3g Q6hrs x 4days, stop date 7/21  -Cardiac:  -Vitals per ENT free flap protocol then transition to Q4hr vitals  -Aspirin 325mg daily, Metoprolol 6.25 mg BID  -Continue to follow HF recs   -Endo:    -ISS & POCT blood glucose testing  -GI:   -Bowel regimen( BID Miralax started), PPI,  and PRN anti-emetic  -On btf  -:   -Voiding   -Steroids/Special:   -Incision and oral care per ENT free flap protocol  -Embolic PPx:  -subcutaneous Lovenox  -SCDs while in bed  -Dispo:  -[facility]  -PT/OT ordered  -Discharge planning for POD 6 home with home care vs skilled nursing facility      Seen and discussed with Dr. Oswaldo Cruz DO PGY-1  ENT               [1] acetaminophen, 975 mg, oral, q8h CLARICE   Or  acetaminophen, 1,000  mg, oral, q8h CLARICE   Or  acetaminophen, 1,000 mg, g-tube, q8h CLARICE  ampicillin-sulbactam, 3 g, intravenous, q6h  aspirin, 325 mg, g-tube, Daily  atorvastatin, 40 mg, g-tube, Nightly  chlorhexidine, 15 mL, Mouth/Throat, Daily  chlorhexidine, 15 mL, Mouth/Throat, TID after meals  cholecalciferol, 50 mcg, g-tube, Daily  enoxaparin, 40 mg, subcutaneous, q24h  [Held by provider] furosemide, 60 mg, g-tube, Daily  hydrogen peroxide, 1 Application, Topical, TID  metoprolol tartrate, 6.25 mg, g-tube, BID  pantoprazole, 40 mg, intravenous, Daily  perflutren lipid microspheres, 0.5-10 mL of dilution, intravenous, Once in imaging  [START ON 7/22/2025] polyethylene glycol, 17 g, g-tube, Daily  [Held by provider] sacubitriL-valsartan, 1 tablet, oral, BID  spironolactone, 12.5 mg, g-tube, Daily  sulfur hexafluoride microsphr, 2 mL, intravenous, Once in imaging  white petrolatum, 1 Application, Topical, TID     [2]    [3] PRN medications: albuterol, dextrose, dextrose, glucagon, glucagon, melatonin, naloxone, naloxone, ondansetron **OR** ondansetron, oxyCODONE, oxyCODONE

## 2025-07-21 NOTE — NURSING NOTE
Code Flora called. Patient yelling at staff. Patient is being belligerent. Patient threw walker at CTA. Sitter at bedside. Patient demanding to speak to a physician. Patient stating he wants to leave, stating he is a violent person and threatened to throw IV pole at staff.     15:42 ENT resident at bedside.     15:46: Supervisor, WILIAN team at bedside.    No new orders at this time, plan to decrease stimulation and continue to have sitter in room per ENT team.     Karime HENNING, RN

## 2025-07-21 NOTE — CARE PLAN
The patient's goals for the shift include  safety    The clinical goals for the shift include remain safe and free from injury    Problem: Heart Failure  Goal: Improved gas exchange this shift  Outcome: Progressing  Goal: Improved urinary output this shift  Outcome: Progressing  Goal: Reduction in peripheral edema within 24 hours  Outcome: Progressing  Goal: Report improvement of dyspnea/breathlessness this shift  Outcome: Progressing  Goal: Weight from fluid excess reduced over 2-3 days, then stabilize  Outcome: Progressing  Goal: Increase self care and/or family involvement in 24 hours  Outcome: Progressing     Problem: Pain - Adult  Goal: Verbalizes/displays adequate comfort level or baseline comfort level  Outcome: Progressing     Problem: Safety - Adult  Goal: Free from fall injury  Outcome: Progressing     Problem: Discharge Planning  Goal: Discharge to home or other facility with appropriate resources  Outcome: Progressing     Problem: Chronic Conditions and Co-morbidities  Goal: Patient's chronic conditions and co-morbidity symptoms are monitored and maintained or improved  Outcome: Progressing

## 2025-07-21 NOTE — SIGNIFICANT EVENT
ENT Flap Check    Subjective:  Patient resting comfortably.  Tube feeds are now at goal; however, still pending BM.  Area of neck bruising appears stable without any spread or increase in swelling      Objective:  Vitals reviewed in EMR  Gen: NAD, AOx3, resting comfortably in bed  Face/Neck: All incisions c/d/i without evidence of dehiscence -- stable supraincisional bruising, no fluctuance no overlying skin changes and no tenderness to palpation  -penroses (x3) with appropriate output after milking  -External doppler with strong biphasic arterial signal  Oral Cavity: All intraoral incisions c/d/i without evidence of dehiscence  -Flap soft and pink with good capillary refill, lateral area of bruising is stable from prior check  Extremities: Left arm warm with intact movement and sensation  -Left thigh skin graft donor site with tegaderm in place  Abdomen: PEG in place  Drains: All drains in place with serosanguinous drainage (stripped)    Assessment/Plan:  Shola Carbajal is a 80 y.o. male who underwent PEG, left oral cavity composite resection, left ND level 1B, left radial forearm free flap on 7/17/2025. Flap check with lateral bruising - stable.  Stable bruising along neck incision line.     -Every 6 hours resident flap checks  - Delirium precautions  - Continue to monitor intraoral bruising as well as bruising along neck incision line       Giovanny Davis MD - PGY2  Otolaryngology - Head and Neck Surgery  Head & Neck team phone: [phone]  ENT consult pager: [phone]   Pediatric ENT pager: [phone]  ENT Outpatient scheduling number: [phone]

## 2025-07-21 NOTE — SIGNIFICANT EVENT
"   07/21/25 1630   Abraham Hines Initated by Karime CRUMP   Pager Date 07/21/25   Pager Time [phone]   Individual Involved Patient   Location/Room [facility] 5022   Arrival Date 07/21/25   Arrival Time 1545   Visit Reason Code violet   Present at Code Primary provider;Nurse;Supervisor;Police services;Patient care technician   Primary Reason for Call Aggressive/threatening behavior   Interventions Initiation of team presence;Limit setting;Patient education;Verbal de-escalation   Description of Event Pt has been frustrated with care intermittently. Staff called code violet this afternoon when pt began yelling and threw/knocked over his walker in frustration. On arrival pt remained frustrated but appropriate and talking with providers. Initially demanding to go home but stated he is willing to stay at this time for continued treatment and monitoring. Pt verbalized complaints around \"waiting too long\" and stated \"your staff sucks\". Pt is at increased risk of falls and had been confused (particularly at night) over the weekend and is therefore with a pt observer. Pt alert and oriented x4 at this time and was wittnessed ambulating with a steady gate. Education provided on his increased falls risk due to recent surgery, age, gender and medication regimen and pt verbalized an understanding. Pt ambulated to bathroom with steady gait while we were speaking. Pt observer denies that pt threw walker at him and states he \"knocked it over because he was mad\".   Medications Administered No medications administered   PRN Medication Available No   Plan and Interventions Going Forward Offer care and appropriate safety measures to pt. If he refuses, document your education and his refusal and ask pt to let you know if/when he is ready to engage. Please notify provider of refusal of care as well. If there is concern for increased confusion or mental status change please notify primary provider.   Plan Reviewed with patient   Outcome " Situation resolved   Event End Date 07/21/25   Event End Time 0229

## 2025-07-21 NOTE — PROGRESS NOTES
Occupational Therapy    Evaluation    Patient Name: Shola Carbajal  MRN: [id number]  Today's Date: 7/21/2025  Room: [address] [place], [place] [address]  Time Calculation  Start Time: 1325  Stop Time: 1337  Time Calculation (min): 12 min    Assessment  IP OT Assessment  OT Assessment: Pt demos the ability to complete ADLs and functional transfers close to baseline at this time (CGA-IND). The pt has appropriate support and home setup to enable safe discharge home. No acute OT need identified. Signing off.  Prognosis: Good  Barriers to Discharge Home: No anticipated barriers  Evaluation/Treatment Tolerance: Patient tolerated treatment well  Medical Staff Made Aware: Yes  End of Session Communication: Bedside nurse  End of Session Patient Position: Bed, 4 rail up, Alarm off, not on at start of session, Alarm off, caregiver present (Sitter present)  Plan:  Inpatient Plan  No Skilled OT: No acute OT goals identified  OT Frequency: OT eval only  OT Discharge Recommendations: No OT needed after discharge, No further acute OT  Equipment Recommended upon Discharge: Wheeled walker (Shower chair)  OT Recommended Transfer Status: Stand by assist, Assist of 1  OT - OK to Discharge: Yes  OT Assessment  Prognosis: Good  Barriers to Discharge: None  Evaluation/Treatment Tolerance: Patient tolerated treatment well  Medical Staff Made Aware: Yes  Strengths: Attitude of self, Capable of completing ADLs semi/independent, Support of Caregivers, Housing layout (Reports several family members with medical background can support at d/c)  Barriers to Participation: Comorbidities    Subjective   Current Problem:  1. Cancer of oral cavity (Multi)  Cardiac Device Check - Surgery    Cardiac Device Check - Surgery    Surgical Pathology Exam    Surgical Pathology Exam    CANCELED: Cardiac device check - Inpatient    CANCELED: Cardiac device check - Inpatient      2. AICD (automatic cardioverter/defibrillator) present  Cardiac device check - Inpatient    Cardiac device check  "- Inpatient      3. Chronic systolic congestive heart failure  Transthoracic Echo Complete    Transthoracic Echo Complete    Transthoracic Echo Complete        General:  Reason for Referral: PEG, left oral cavity composite resection, left ND level 1B, left radial forearm free flap on 7/17/2025  Past Medical History Relevant to Rehab: CAD s/p PCI to RCA and severe LAD, IC-HFrEF (EF 30-35% - 2023) s/p ICD 9/2023), HTN, moderate/severe COPD, JAE, hx ov AVM c/b GI bleed (2013), L oral cavity SCC  Prior to Session Communication: Bedside nurse  Patient Position Received: Bed, 4 rail up, Alarm off, not on at start of session, Alarm off, caregiver present  Family/Caregiver Present: Yes  Caregiver Feedback: Sitter present during session  General Comment: Pt presents supine in bed, agreeable to OT evaluation - reports \"I am leaving here Wednesday whether they release me or not.\"   Precautions:  UE Weight Bearing Status: Other (Comment) (L UE touch down WB)  Medical Precautions: Fall precautions  Vital Signs:   Date/Time Vitals Session Patient Position Pulse Resp SpO2 BP MAP (mmHg)    07/21/25 1207 --  --  69  18  94 %  118/66  83      Pain:  Pain Assessment  Pain Assessment: 0-10  0-10 (Numeric) Pain Score: 0 - No pain  Lines/Tubes/Drains:  Open Drain 1 Anterior;Left Neck 0.5 cm (Active)   Number of days: 3       Open Drain 2 Anterior;Lateral Neck 0.25 cm (Active)   Number of days: 3       Gastrostomy/Enterostomy Percutaneous endoscopic gastrostomy (PEG) 4 20 Fr. LUQ (Active)   Number of days: 4   Objective   Cognition:  Overall Cognitive Status: Within Functional Limits  Arousal/Alertness: Appropriate responses to stimuli  Orientation Level: Oriented X4  Following Commands: Follows all commands and directions without difficulty   Home Living:  Type of Home: House  Lives With: Spouse  Home Adaptive Equipment: Cane  Home Layout: One level, Laundry main level  Home Access: Stairs to enter with rails  Entrance Stairs-Number of " Steps: 6  Bathroom Shower/Tub: Tub/shower unit  Bathroom Equipment: Grab bars in shower   Prior Function:  Level of [place]: Independent with ADLs and functional transfers, Independent with homemaking with ambulation  ADL Assistance: Independent  Homemaking Assistance: Independent  Ambulatory Assistance: Independent  Vocational: Retired  Leisure: Games  Prior Function Comments: Reports 1 fall a few weeks ago when L foot went numb - reports no injury  IADL History:  Homemaking Responsibilities: Yes  Meal Prep Responsibility: Primary  Laundry Responsibility: Primary  Cleaning Responsibility: Primary  Bill Paying/Finance Responsibility: Primary  Shopping Responsibility: Primary  Current License: Yes  Mode of Transportation: Car  Occupation: Retired  Leisure and Hobbies: Games  ADL:  Eating Assistance: Independent (Anticipated)  Grooming Assistance: Independent  Bathing Assistance: Stand by (Anticipated)  Bathing Deficit: Supervision/safety  UE Dressing Assistance: Stand by (Anticipated)  LE Dressing Assistance: Stand by  LE Dressing Deficit: Supervision/safety  Toileting Assistance with Device: Stand by  Toileting Deficit: Supervison/safety  ADL Comments: No concerns  Activity Tolerance:  Endurance: Tolerates 10 - 20 min exercise with multiple rests  Balance:  Dynamic Standing Balance  Dynamic Standing-Balance Support: Bilateral upper extremity supported  Dynamic Standing-Level of Assistance: Close supervision, Contact guard  Dynamic Standing-Comments: FWW  Static Sitting Balance  Static Sitting-Balance Support: No upper extremity supported, Feet supported  Static Sitting-Level of Assistance: Independent  Static Sitting-Comment/Number of Minutes: EOB  Static Standing Balance  Static Standing-Balance Support: Bilateral upper extremity supported  Static Standing-Level of Assistance: Distant supervision  Static Standing-Comment/Number of Minutes: FWW  Bed Mobility/Transfers: Bed Mobility  Bed Mobility: Yes  Bed  "Mobility 1  Bed Mobility 1: Supine to sitting, Sitting to supine  Level of Assistance 1: Distant supervision  Bed Mobility Comments 1: Use of bed rail  Functional Mobility  Functional Mobility Performed: Yes  Functional Mobility 1  Surface 1: Level tile  Device 1: Rolling walker  Assistance 1: Contact guard  Comments 1: Within room to bathroom and back   and Transfers  Transfer: Yes  Transfer 1  Transfer From 1: Sit to, Stand to  Transfer to 1: Stand, Sit  Technique 1: Sit to stand, Stand to sit  Transfer Device 1: Walker  Transfer Level of Assistance 1: Contact guard  Trials/Comments 1: Cues for hand placement  Transfers 2  Transfer From 2: Chair with arms to  Transfer to 2: Toilet  Technique 2:  (Ambulating)  Transfer Device 2: Walker  Transfer Level of Assistance 2: Close supervision  Trials/Comments 2: Cues for increased safety  IADL's:   Homemaking Responsibilities: Yes  Meal Prep Responsibility: Primary  Laundry Responsibility: Primary  Cleaning Responsibility: Primary  Bill Paying/Finance Responsibility: Primary  Shopping Responsibility: Primary  Current License: Yes  Mode of Transportation: Car  Occupation: Retired  Leisure and Hobbies: Games  Vision: Vision - Basic Assessment  Current Vision: No visual deficits   and    Sensation:  Light Touch: No apparent deficits  Sensation Comment: Reports L foot \"falls asleep\" Intermittently  Strength:  Strength Comments: R UE WFL, L UE appears WFL however limited by WB restrictions  Perception:  Inattention/Neglect: Appears intact  Coordination:  Movements are Fluid and Coordinated: Yes   Hand Function:  Hand Function  Gross Grasp: Functional  Coordination: Functional  Extremities: RUE   RUE : Within Functional Limits, LUE   LUE: Exceptions to WFL (Distal UE TDWB, elbow and shoulder WFL),  , and      Outcome Measures: [facility] Daily Activity  Putting on and taking off regular lower body clothing: A little  Bathing (including washing, rinsing, drying): A little  Putting on " and taking off regular upper body clothing: None  Toileting, which includes using toilet, bedpan or urinal: A little  Taking care of personal grooming such as brushing teeth: None  Eating Meals: None  Daily Activity - Total Score: 21         ,     OT Adult Other Outcome Measures  4AT: 4 AT -    Education Documentation  Body Mechanics, taught by Miky Vuong OT at 7/21/2025  1:57 PM.  Learner: Patient  Readiness: Acceptance  Method: Explanation, Demonstration  Response: Verbalizes Understanding, Demonstrated Understanding  Comment: ADLs and safety    Precautions, taught by Miky Vuong OT at 7/21/2025  1:57 PM.  Learner: Patient  Readiness: Acceptance  Method: Explanation, Demonstration  Response: Verbalizes Understanding, Demonstrated Understanding  Comment: ADLs and safety    ADL Training, taught by Miky Vuong OT at 7/21/2025  1:57 PM.  Learner: Patient  Readiness: Acceptance  Method: Explanation, Demonstration  Response: Verbalizes Understanding, Demonstrated Understanding  Comment: ADLs and safety    Education Comments  No comments found.    07/21/25 at 1:58 PM   Miky Vuong OT   Rehab Office: [phone]

## 2025-07-22 ENCOUNTER — HOME HEALTH ADMISSION (OUTPATIENT)
Dept: HOME HEALTH SERVICES | Facility: HOME HEALTH | Age: 80
End: 2025-07-22
Payer: MEDICARE

## 2025-07-22 ENCOUNTER — DOCUMENTATION (OUTPATIENT)
Dept: HOME HEALTH SERVICES | Facility: HOME HEALTH | Age: 80
End: 2025-07-22
Payer: MEDICARE

## 2025-07-22 ENCOUNTER — HOME CARE VISIT (OUTPATIENT)
Dept: HOME HEALTH SERVICES | Facility: HOME HEALTH | Age: 80
End: 2025-07-22

## 2025-07-22 VITALS
HEART RATE: 70 BPM | HEIGHT: 70 IN | OXYGEN SATURATION: 95 % | TEMPERATURE: 96.8 F | WEIGHT: 169.53 LBS | RESPIRATION RATE: 16 BRPM | SYSTOLIC BLOOD PRESSURE: 114 MMHG | BODY MASS INDEX: 24.27 KG/M2 | DIASTOLIC BLOOD PRESSURE: 71 MMHG

## 2025-07-22 PROBLEM — K92.2 GI BLEED: Status: RESOLVED | Noted: 2025-07-17 | Resolved: 2025-07-22

## 2025-07-22 LAB
ALBUMIN SERPL BCP-MCNC: 3.5 G/DL (ref 3.4–5)
ANION GAP SERPL CALC-SCNC: 11 MMOL/L (ref 10–20)
BUN SERPL-MCNC: 37 MG/DL (ref 6–23)
CALCIUM SERPL-MCNC: 10.3 MG/DL (ref 8.6–10.6)
CHLORIDE SERPL-SCNC: 105 MMOL/L (ref 98–107)
CO2 SERPL-SCNC: 27 MMOL/L (ref 21–32)
CREAT SERPL-MCNC: 1.11 MG/DL (ref 0.5–1.3)
EGFRCR SERPLBLD CKD-EPI 2021: 68 ML/MIN/1.73M*2
ERYTHROCYTE [DISTWIDTH] IN BLOOD BY AUTOMATED COUNT: 14.7 % (ref 11.5–14.5)
GLUCOSE SERPL-MCNC: 90 MG/DL (ref 74–99)
HCT VFR BLD AUTO: 25.6 % (ref 41–52)
HGB BLD-MCNC: 8 G/DL (ref 13.5–17.5)
MAGNESIUM SERPL-MCNC: 2.53 MG/DL (ref 1.6–2.4)
MCH RBC QN AUTO: 31.9 PG (ref 26–34)
MCHC RBC AUTO-ENTMCNC: 31.3 G/DL (ref 32–36)
MCV RBC AUTO: 102 FL (ref 80–100)
NRBC BLD-RTO: 0.2 /100 WBCS (ref 0–0)
PHOSPHATE SERPL-MCNC: 3.3 MG/DL (ref 2.5–4.9)
PLATELET # BLD AUTO: 198 X10*3/UL (ref 150–450)
POTASSIUM SERPL-SCNC: 4.2 MMOL/L (ref 3.5–5.3)
RBC # BLD AUTO: 2.51 X10*6/UL (ref 4.5–5.9)
SODIUM SERPL-SCNC: 139 MMOL/L (ref 136–145)
WBC # BLD AUTO: 8.5 X10*3/UL (ref 4.4–11.3)

## 2025-07-22 PROCEDURE — 2500000004 HC RX 250 GENERAL PHARMACY W/ HCPCS (ALT 636 FOR OP/ED): Performed by: STUDENT IN AN ORGANIZED HEALTH CARE EDUCATION/TRAINING PROGRAM

## 2025-07-22 PROCEDURE — 99239 HOSP IP/OBS DSCHRG MGMT >30: CPT | Performed by: NURSE PRACTITIONER

## 2025-07-22 PROCEDURE — 85027 COMPLETE CBC AUTOMATED: CPT

## 2025-07-22 PROCEDURE — 80069 RENAL FUNCTION PANEL: CPT

## 2025-07-22 PROCEDURE — 83735 ASSAY OF MAGNESIUM: CPT

## 2025-07-22 PROCEDURE — 36415 COLL VENOUS BLD VENIPUNCTURE: CPT

## 2025-07-22 PROCEDURE — 2500000001 HC RX 250 WO HCPCS SELF ADMINISTERED DRUGS (ALT 637 FOR MEDICARE OP): Performed by: STUDENT IN AN ORGANIZED HEALTH CARE EDUCATION/TRAINING PROGRAM

## 2025-07-22 RX ORDER — HYDROGEN PEROXIDE 3 %
1 SOLUTION, NON-ORAL MISCELLANEOUS 3 TIMES DAILY
Qty: 900 ML | Refills: 0 | Status: SHIPPED | OUTPATIENT
Start: 2025-07-22 | End: 2025-08-21

## 2025-07-22 RX ORDER — ASPIRIN 325 MG
325 TABLET ORAL DAILY
Qty: 25 TABLET | Refills: 0 | Status: SHIPPED | OUTPATIENT
Start: 2025-07-23 | End: 2025-08-17

## 2025-07-22 RX ORDER — CHLORHEXIDINE GLUCONATE ORAL RINSE 1.2 MG/ML
15 SOLUTION DENTAL
Qty: 1419 ML | Refills: 0 | Status: SHIPPED | OUTPATIENT
Start: 2025-07-22 | End: 2025-07-22 | Stop reason: HOSPADM

## 2025-07-22 RX ORDER — CARVEDILOL 3.12 MG/1
3.12 TABLET ORAL 2 TIMES DAILY
Qty: 60 TABLET | Refills: 0 | Status: SHIPPED | OUTPATIENT
Start: 2025-07-22 | End: 2025-07-22

## 2025-07-22 RX ORDER — BISMUTH TRIBROMOPH/PETROLATUM 5"X9"
1 BANDAGE TOPICAL DAILY
Qty: 30 EACH | Refills: 2 | Status: SHIPPED | OUTPATIENT
Start: 2025-07-22 | End: 2025-08-21

## 2025-07-22 RX ORDER — ASPIRIN 325 MG
325 TABLET ORAL DAILY
Qty: 25 TABLET | Refills: 0 | Status: SHIPPED | OUTPATIENT
Start: 2025-07-23 | End: 2025-07-22

## 2025-07-22 RX ORDER — SPIRONOLACTONE 25 MG/1
12.5 TABLET ORAL DAILY
Qty: 15 TABLET | Refills: 0 | Status: SHIPPED | OUTPATIENT
Start: 2025-07-23 | End: 2025-07-22 | Stop reason: HOSPADM

## 2025-07-22 RX ORDER — PETROLATUM 420 MG/G
1 OINTMENT TOPICAL 3 TIMES DAILY
Qty: 9 G | Refills: 0 | Status: SHIPPED | OUTPATIENT
Start: 2025-07-22 | End: 2025-08-21

## 2025-07-22 RX ORDER — PETROLATUM 420 MG/G
1 OINTMENT TOPICAL 3 TIMES DAILY
Qty: 368 G | Refills: 0 | Status: SHIPPED | OUTPATIENT
Start: 2025-07-22 | End: 2025-07-22

## 2025-07-22 RX ORDER — FUROSEMIDE 20 MG/1
20 TABLET ORAL DAILY PRN
Qty: 30 TABLET | Refills: 0 | Status: SHIPPED | OUTPATIENT
Start: 2025-07-22 | End: 2025-08-21

## 2025-07-22 RX ORDER — ACETAMINOPHEN 160 MG/5ML
1000 LIQUID ORAL EVERY 8 HOURS SCHEDULED
Qty: 3654 ML | Refills: 0 | Status: SHIPPED | OUTPATIENT
Start: 2025-07-22 | End: 2025-07-22 | Stop reason: HOSPADM

## 2025-07-22 RX ORDER — CHLORHEXIDINE GLUCONATE ORAL RINSE 1.2 MG/ML
15 SOLUTION DENTAL 3 TIMES DAILY
Qty: 1350 ML | Refills: 0 | Status: SHIPPED | OUTPATIENT
Start: 2025-07-23 | End: 2025-08-22

## 2025-07-22 RX ORDER — CARVEDILOL 3.12 MG/1
3.12 TABLET ORAL 2 TIMES DAILY
Qty: 60 TABLET | Refills: 0 | Status: SHIPPED | OUTPATIENT
Start: 2025-07-22

## 2025-07-22 RX ORDER — ATORVASTATIN CALCIUM 40 MG/1
40 TABLET, FILM COATED ORAL NIGHTLY
Qty: 30 TABLET | Refills: 0 | Status: SHIPPED | OUTPATIENT
Start: 2025-07-22 | End: 2025-07-22 | Stop reason: HOSPADM

## 2025-07-22 RX ORDER — HYDROGEN PEROXIDE 3 %
1 SOLUTION, NON-ORAL MISCELLANEOUS 3 TIMES DAILY
Qty: 946 ML | Refills: 0 | Status: SHIPPED | OUTPATIENT
Start: 2025-07-22 | End: 2025-07-22

## 2025-07-22 RX ORDER — OXYCODONE HCL 5 MG/5 ML
5 SOLUTION, ORAL ORAL EVERY 4 HOURS PRN
Qty: 60 ML | Refills: 0 | Status: SHIPPED | OUTPATIENT
Start: 2025-07-22 | End: 2025-08-08 | Stop reason: ALTCHOICE

## 2025-07-22 RX ORDER — ONDANSETRON 4 MG/1
4 TABLET, FILM COATED ORAL EVERY 8 HOURS PRN
Qty: 20 TABLET | Refills: 0 | Status: SHIPPED | OUTPATIENT
Start: 2025-07-22

## 2025-07-22 RX ORDER — ACETAMINOPHEN 325 MG/1
975 TABLET ORAL EVERY 8 HOURS SCHEDULED
Qty: 270 TABLET | Refills: 0 | Status: SHIPPED | OUTPATIENT
Start: 2025-07-22 | End: 2025-08-21

## 2025-07-22 RX ORDER — CHOLECALCIFEROL (VITAMIN D3) 50 MCG
50 TABLET ORAL DAILY
Qty: 30 TABLET | Refills: 0 | Status: SHIPPED | OUTPATIENT
Start: 2025-07-23 | End: 2025-07-22 | Stop reason: HOSPADM

## 2025-07-22 RX ORDER — CHLORHEXIDINE GLUCONATE ORAL RINSE 1.2 MG/ML
15 SOLUTION DENTAL 3 TIMES DAILY
Qty: 473 ML | Refills: 0 | Status: SHIPPED | OUTPATIENT
Start: 2025-07-23 | End: 2025-07-22

## 2025-07-22 RX ORDER — OXYCODONE HCL 5 MG/5 ML
5 SOLUTION, ORAL ORAL EVERY 4 HOURS PRN
Qty: 60 ML | Refills: 0 | Status: SHIPPED | OUTPATIENT
Start: 2025-07-22 | End: 2025-07-22

## 2025-07-22 RX ORDER — ONDANSETRON 4 MG/1
4 TABLET, FILM COATED ORAL EVERY 8 HOURS PRN
Qty: 20 TABLET | Refills: 0 | Status: SHIPPED | OUTPATIENT
Start: 2025-07-22 | End: 2025-07-22

## 2025-07-22 RX ADMIN — HYDROGEN PEROXIDE 1 APPLICATION: 30 SOLUTION TOPICAL at 08:23

## 2025-07-22 RX ADMIN — ACETAMINOPHEN 1000 MG: 160 SOLUTION ORAL at 13:11

## 2025-07-22 RX ADMIN — Medication 50 MCG: at 08:22

## 2025-07-22 RX ADMIN — ASPIRIN 325 MG ORAL TABLET 325 MG: 325 PILL ORAL at 08:22

## 2025-07-22 RX ADMIN — CHLORHEXIDINE GLUCONATE 0.12% ORAL RINSE 15 ML: 1.2 LIQUID ORAL at 08:21

## 2025-07-22 RX ADMIN — CHLORHEXIDINE GLUCONATE 0.12% ORAL RINSE 15 ML: 1.2 LIQUID ORAL at 13:09

## 2025-07-22 RX ADMIN — Medication 1 APPLICATION: at 08:23

## 2025-07-22 RX ADMIN — ENOXAPARIN SODIUM 40 MG: 100 INJECTION SUBCUTANEOUS at 08:22

## 2025-07-22 RX ADMIN — ACETAMINOPHEN 1000 MG: 160 SOLUTION ORAL at 05:02

## 2025-07-22 RX ADMIN — PANTOPRAZOLE SODIUM 40 MG: 40 INJECTION, POWDER, LYOPHILIZED, FOR SOLUTION INTRAVENOUS at 08:21

## 2025-07-22 ASSESSMENT — COGNITIVE AND FUNCTIONAL STATUS - GENERAL
CLIMB 3 TO 5 STEPS WITH RAILING: A LITTLE
DRESSING REGULAR LOWER BODY CLOTHING: A LITTLE
DAILY ACTIVITIY SCORE: 19
PERSONAL GROOMING: A LITTLE
DRESSING REGULAR UPPER BODY CLOTHING: A LITTLE
EATING MEALS: A LITTLE
HELP NEEDED FOR BATHING: A LITTLE
WALKING IN HOSPITAL ROOM: A LITTLE
MOBILITY SCORE: 22

## 2025-07-22 ASSESSMENT — PAIN SCALES - GENERAL: PAINLEVEL_OUTOF10: 0 - NO PAIN

## 2025-07-22 NOTE — NURSING NOTE
Discharge note    Patient discharged to home. RN reviewed incision care with patient and family. RN showed patient/family how to take down dressing on left arm, and put a clean one on. RN demonstrated how to clean the incisions and apply aquaphor. STSG site dressing was soaked off before patient left.     Patient provided with printed discharge instructions which were reviewed with patient/family prior to discharge.     PIV removed intact, patient to be driven home by family member, all belongings sent with patient.

## 2025-07-22 NOTE — HOSPITAL COURSE
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 Chacon and Dr. Thomas. Please see operative report for full details. Patient tolerated the procedure well and recovered briefly in PACU before being transitioned to regular nursing floor. Post-op course involved cardiology consult for assistance with restart of HF meds in the acute post operative period. He also did have an KULWANT immediately postop. Cardiology assisted with management of volume status during this time and his kidney function promptly recovered and stayed within normal limits. Moreover, there were some concerns for hospital acquired delirium - no organic etiology could be identified and his mental status briefly waxed and waned after which it resolved with reorientation. Although his delirium resolved, he did have a code violet called for throwing his walker. He was frustrated regarding the lack of response time for nurses. He wanted an AMA discharge but after explaining the reasons to stay and allowing the patient ambulation to the restroom independently, he agreed to stay. Diet was advanced as tolerated via enteral feeds - he was trialled on sips and chips prior to discharge. IV medication transitioned to oral as diet advanced. The drains were monitored and once the output was less than 30ml in a 24hr time frame they were removed accordingly. One penrose was kept in place in the neck, and he is to follow-up to have this removed. On the day of discharge, the pt was tolerating a diet, pain was controlled on PO pain medication, and they were ambulating and voiding spontaneously. They were discharged to home health care in stable condition with instructions to follow up as outpatient.

## 2025-07-22 NOTE — HH CARE COORDINATION
Home Care received a Referral for Nursing, Physical Therapy, and Occupational Therapy. We have processed the referral for a Start of Care on 24 HOURS .     If you have any questions or concerns, please feel free to contact us at [phone]. Follow the prompts, enter your five digit zip code, and you will be directed to your care team on WE[facility] 1.

## 2025-07-22 NOTE — DISCHARGE INSTRUCTIONS
Follow-up with your cardiologist in 2-4 weeks.  Discuss restart of your aspirin 81 once your aspirin 325 is completed  Discuss if appropriate to continue sildenafil

## 2025-07-22 NOTE — PROGRESS NOTES
Subjective Data:  Advanced Heart Failure Progress Note   Consulting Team:  Primary Cardiologist:  Reason for Consult:    Interval events  Creatinine continues to improve   BP soft with resumption of entresto   Pt with mild dizziness when standing - chronic         Plan  Patient appears euvolemic on exam  Continue Entresto and Coreg. His Baseline BP's is 90/60      Subjective   Shola Carbajal is a 80 y.o. 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  Heart failure team consulted for management of diuresis and management of medication resumption postsurgery.     Patient was originally evaluated for new cancer by his dentist after breaking a tooth while eating. He was then biopsied by an outside ENT and referred here for management. Had resection and reconstruction 7/17.     Objective   Physical Exam  Vitals:    07/22/25 0810   BP: 93/53   Pulse:    Resp:    Temp:    SpO2:        GEN: Healthy appearing, well-developed, NAD.  CV: RRR, no m/r/g. JVP   LUNGS: CTAB, no w/r/c.  ABD: Soft, NT/ND, NBS, no masses or organomegaly.  SKIN: Warm, well perfused. No skin rashes or abnormal lesions.  No LE edema   MSK: Normal gait. No deformities.  EXT: No clubbing, cyanosis, or edema.  NEURO: Ambulating with no limitations. No focal deficits.    I have personally reviewed the following images and laboratory findings:    ECG: normal sinus rhythm and PVC's are occasional and unifocal  .    Results for orders placed during the hospital encounter of 07/17/25    Transthoracic Echo Complete    Narrative  [facility], [address] [place] [address]  Tel [phone] and Fax [phone]    TRANSTHORACIC ECHOCARDIOGRAM REPORT      Patient Name:       SHOLA CARBAJAL        Reading Physician:    [id number] Tc Sidhu MD  Study Date:         7/18/2025           Ordering Provider:    [id number] AFTAB POLANCO  MRN/PID:            [id number]            Fellow:  Accession#:         [id number]        Nurse:  Date of Birth/Age:  1945 / 80 years Sonographer:          Parul Moody RDCS  Gender assigned at                     Additional Staff:  Birth:  Height:             177.80 cm           Admit Date:           7/17/2025  Weight:             76.66 kg            Admission Status:     Inpatient -  Routine  BSA / BMI:          1.94 m2 / 24.25     Encounter#:           [id number]  kg/m2  Blood Pressure:     106/65 mmHg         Department Location:  [place] [address]    Study Type:    TRANSTHORACIC ECHO (TTE) COMPLETE  Diagnosis/ICD: Chronic systolic (congestive) heart failure (CHF)-I50.22  Indication:    chronic systolic heart failure  CPT Code:      Echo Complete w Full Doppler-95607    Patient History:  Pertinent History: CAD s/p PCI, Ischemic cardiomyopathy, HFrEF s/p ICD, HTN,  JAE, COPD, Hx. of MI, PVD, Oral cancer, PVD.    Study Detail: The following Echo studies were performed: 2D, M-Mode, color flow  and Doppler. Technically challenging study due to postoperative  dressings and patient lying in supine position. Optison used as a  contrast agent for endocardial border definition. Total contrast  used for this procedure was 3 mL via IV push. Unable to obtain  suprasternal notch view.      PHYSICIAN INTERPRETATION:  Left Ventricle: The left ventricle was not well visualized. The left ventricular ejection fraction could not be measured. The left ventricular cavity size was not assessed. There is normal septal and normal posterior left ventricular wall thickness. Left ventricular diastolic filling was not assessed.  Left Atrium: The left atrial size was not well visualized.  Right Ventricle: The right ventricle is moderately enlarged. There is moderately reduced right ventricular systolic  function. A device is visualized in the right ventricle.  Right Atrium: The right atrial size was not well visualized. There is a device visualized in the right atrium.  Aortic Valve: The aortic valve was not well visualized. There is no evidence of aortic valve regurgitation.  Mitral Valve: The mitral valve was not well visualized. There is no evidence of mitral valve regurgitation.  Tricuspid Valve: The tricuspid valve was not well visualized. There is trace tricuspid regurgitation. The Doppler estimated right ventricular systolic pressure (RVSP) is mildly elevated at 41 mmHg. Reported right ventricular systolic pressure may be underestimated due to incomplete or suboptimal Doppler envelope.  Pulmonic Valve: The pulmonic valve was not assessed. Pulmonic valve regurgitation was not assessed.  Pericardium: There is no pericardial effusion noted.  Aorta: The aortic root was not well visualized.  Systemic Veins: The inferior vena cava appears dilated, with IVC inspiratory collapse less than 50%.  In comparison to the previous echocardiogram(s): There are no prior studies on this patient for comparison purposes.      CONCLUSIONS:  1. Poorly visualized anatomical structures due to suboptimal image quality.  2. The left ventricle was not well visualized despite the use of echocontrast. The left ventricular ejection fraction could not be measured. Consider alternative imaging.  3. There is moderately reduced right ventricular systolic function.  4. Moderately enlarged right ventricle.  5. The Doppler estimated RVSP is mildly elevated at 41 mmHg.    QUANTITATIVE DATA SUMMARY:    2D MEASUREMENTS:         Normal Ranges:  Ao Root d:       3.55 cm (2.0-3.7cm)  IVSd:            1.00 cm (0.6-1.1cm)  LVPWd:           0.90 cm (0.6-1.1cm)  LVIDd:           4.40 cm (3.9-5.9cm)  LVIDs:           3.60 cm  LV Mass Index:   71 g/m2  LV % FS          18.2 %      AORTIC VALVE:           Normal Ranges:  AoV Vmax:      1.05 m/s  "(<=1.7m/s)  AoV Peak P.4 mmHg (<20mmHg)  LVOT Max Mitch:  0.70 m/s (<=1.1m/s)  LVOT VTI:      13.40 cm  LVOT Diameter: 2.00 cm  (1.8-2.4cm)  AoV Area,Vmax: 2.11 cm2 (2.5-4.5cm2)      RIGHT VENTRICLE:  TAPSE: 16.0 mm  RV s'  0.06 m/s      TRICUSPID VALVE/RVSP:          Normal Ranges:  Peak TR Velocity:     2.56 m/s  Est. RA Pressure:     15  RV Syst Pressure:     41       (< 30mmHg)  IVC Diam:             2.90 cm      PULMONIC VALVE:          Normal Ranges:  PV Accel Time:  99 msec  (>120ms)  PV Max Mitch:     0.8 m/s  (0.6-0.9m/s)  PV Max P.7 mmHg      [id number] Tc Sidhu MD  Electronically signed on 2025 at 5:21:28 PM        ** Final **       Imaging  Chest x-ray: normal      Lab Review   CMP:  Recent Labs     25  0609 25  1039 25  0613 25  0619 07/17/25  2213 07/10/25  1050    141 140 140 140 138   K 4.4 4.1 4.0 4.6 4.4 4.3   * 105 105 105 104 103   CO2 28 30 28 26 25 28   ANIONGAP 10 10 11 14 15 11   BUN 34* 30* 29* 28* 27* 20   CREATININE 1.24 1.27 1.33* 1.43* 1.44* 1.27   EGFR 60* 58* 55* 50* 50* 58*   MG 2.61* 2.56* 2.30 1.95 1.86 2.00     Recent Labs     25  0609 25  1039 25  0613 25  0625  2213   ALBUMIN 3.6 3.5 3.6 3.8 3.9     CBC:  Recent Labs     25  0609 25  1039 25  0613 25  0619 07/17/25  2213 07/10/25  1050   WBC 8.9 9.7 11.2 14.1* 16.9* 8.2   HGB 7.8* 7.9* 8.5* 8.9* 9.6* 14.5   HCT 25.8* 23.7* 26.1* 27.5* 30.1* 44.0   * 150 132* 148* 148* 202   * 96 98 98 101* 99     COAG: No results for input(s): \"PTT\", \"INR\", \"ARIXTRA\", \"HAUF\", \"DDIMERVTE\", \"HAPTOGLOBIN\", \"FIBRINOGEN\" in the last 94641 hours.  ABO:   Recent Labs     25  0616   ABO O     HEME/ENDO:   Recent Labs     07/10/25  1050 23  1354   TSH 1.90  --    HGBA1C  --  5.4     CARDIAC:   Recent Labs     25  0613   *     No results for input(s): \"CHOL\", \"LDLF\", \"LDLCALC\", \"HDL\", \"TRIG\" in the last 26073 " "hours.  TOX:No results for input(s): \"AMPHETAMINE\", \"BARBSCRNUR\", \"BENZO\", \"CANNABINOID\", \"COCAI\", \"FENTANYL\", \"OPIATE\", \"OXYCODONE\", \"PCP\" in the last 93250 hours.  MICRO: No results for input(s): \"ESR\", \"CRP\", \"PROCAL\" in the last 29811 hours.  No results found for the last 90 days.       BNP   Date/Time Value Ref Range Status   07/19/2025 06:13  (H) 0 - 99 pg/mL Final        Assessment and Plan     Shola Carbajal is a 80 y.o. 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  Heart failure team consulted for management of diuresis and management of medication resumption postsurgery.     Chronic HFrEF (NYHA Class II/AHA Stage C)  Patient presented for recent surgery.  In lieu of that spironolactone, furosemide, and Entresto were held.  No echo seen in hospital records.  Per prior notes EF is 30 to 35% most recently in 2023.  Medications patient takes at home include aspirin 81 mg, atorvastatin 40, furosemide 60 mg daily, metoprolol 25 mg, Entresto 24-26 mg, and spironolactone 12.5 mg.  Right now patient is currently on metoprolol 6.5 mg twice daily.  - Echo was done EF cannot be estimated. No need to repeat echo as it will not   - Diuresis: Patient appears to be euvolemic on exam. For Discharge , Home lasix PRN   - GDMT              - SGLT-2i: Not on any due to history of penile rash.              - Beta-blocker: Continue carvedilol 3.125BID               - ACE-I/ARB/ARNI: Continue home Entresto 24-26 mg twice daily              - MRA: C/w spironolactone 12.5 mg              - Vasodilators: No vasodilators  - Inotropes: None  - RV support: None  - Mechanical support: None  - AICD/CRT-D/Lifevest: ICD placed 2023  - Strict I/Os, Daily Na < 2g " daily, fluid restriction 2 L daily     For his A-fib patient did not tolerate Plavix in the past due to GI bleed.  Unsure if he will tolerate any oral anticoagulant.     [facility]       Thank you for the opportunity to involve us in the care of this fine individual. This plan was discussed with Dr Montejo, HF attending. For any questions or concerns, feel free to reach out via [employer] chat or page at [phone].    HF team to sign off.     Lexi Ness MD   Heart Failure Fellow  PGY-4

## 2025-07-22 NOTE — CARE PLAN
The patient's goals for the shift include  remain safe     The clinical goals for the shift include safety      Problem: Pain - Adult  Goal: Verbalizes/displays adequate comfort level or baseline comfort level  Outcome: Progressing     Problem: Safety - Adult  Goal: Free from fall injury  Outcome: Progressing     Problem: Discharge Planning  Goal: Discharge to home or other facility with appropriate resources  Outcome: Progressing     Problem: Chronic Conditions and Co-morbidities  Goal: Patient's chronic conditions and co-morbidity symptoms are monitored and maintained or improved  Outcome: Progressing     Problem: Nutrition  Goal: Nutrient intake appropriate for maintaining nutritional needs  Outcome: Progressing

## 2025-07-22 NOTE — CARE PLAN
The patient's goals for the shift include      The clinical goals for the shift include patient will remain free from falls throughout shift

## 2025-07-22 NOTE — PROGRESS NOTES
07/18/25 1100   Discharge Planning   Living Arrangements Spouse/significant other   Support Systems Spouse/significant other;Children   Assistance Needed tbd   Type of Residence Private residence   Number of Stairs to Enter Residence 0   Number of Stairs Within Residence 1   Do you have animals or pets at home? Yes   Type of Animals or Pets dog   Who is requesting discharge planning? Provider   Home or Post Acute Services In home services   Type of Home Care Services Home nursing visits;DME or oxygen;Home PT   Expected Discharge Disposition Home H   Does the patient need discharge transport arranged? No   Financial Resource Strain   How hard is it for you to pay for the very basics like food, housing, medical care, and heating? Not hard   Housing Stability   In the last 12 months, was there a time when you were not able to pay the mortgage or rent on time? N   In the past 12 months, how many times have you moved where you were living? 0   At any time in the past 12 months, were you homeless or living in a shelter (including now)? N   Transportation Needs   In the past 12 months, has lack of transportation kept you from medical appointments or from getting medications? no   In the past 12 months, has lack of transportation kept you from meetings, work, or from getting things needed for daily living? No   Patient Choice   Provider Choice list and CMS website (https://medicare.gov/care-compare#search) for post-acute Quality and Resource Measure Data were provided and reviewed with: Patient   Patient / Family choosing to utilize agency / facility established prior to hospitalization No   Stroke Family Assessment   Stroke Family Assessment Needed No   Intensity of Service   Intensity of Service 0-30 min     Care Transitions Note    Plan per Medical/Surgical Team: underwent PEG, left oral cavity composite resection, left ND level 1B, left radial forearm free flap on 7/17/2025   Status: inpatient   Payor Source:  [facility]ic[facility] A and B, [facility]  Discharge disposition: home with home care   Expected date of discharge: 7/23/25  Barriers: none  PCP / Primary Oncologist: Stephanie Wallace MD   Preferred Pharmacy: Stephanie Wallace MD  [facility]    TCC met with patient at bedside, introduced self and role. Demographics and insurance verifed with patient. Patient lives at home with his spouse and was independent with ADLs prior to admission. Patient denies using home care and is able to ambulate independently at baseline. He he is agreeable to TCC using his medicare insurance for home care and DME supplies. If supplies become to expensive [facility]. Will continue to follow for any discharge planning needs.   Rajni CHILDS     7/22/25- [facility]. TCC will continue to follow patient for discharge needs.  Rajni Shaikh RN TCC     Update @1227pm- TCC gave patient 5 days of extra dressing supplies and [facility] contact information. TCC sent dressing supplies to [facility]. Will continue to follow.  Rajni CHILDS

## 2025-07-22 NOTE — DISCHARGE SUMMARY
Discharge Diagnosis  Cancer of oral cavity (Multi)    Issues Requiring Follow-Up  Post op and drain removal    Test Results Pending At Discharge  Pending Labs       Order Current Status    Surgical Pathology Exam In process            Hospital Course  80 yr old male with oral cavity cancer s/p triple, peg tube placement, mandibulectomy, left composite resection, skin graft, mandible reconstruction, radial forearm free flap on 7/17/2025 with Dr. Chacon and Dr. Thomas. Please see operative report for full details. Patient tolerated the procedure well and recovered briefly in PACU before being transitioned to regular nursing floor. Post-op course involved cardiology consult for assistance with restart of HF meds in the acute post operative period. He also did have an KULWANT immediately postop. Cardiology assisted with management of volume status during this time and his kidney function promptly recovered and stayed within normal limits. Moreover, there were some concerns for hospital acquired delirium - no organic etiology could be identified and his mental status briefly waxed and waned after which it resolved with reorientation. Although his delirium resolved, he did have a code violet called for throwing his walker. He was frustrated regarding the lack of response time for nurses. He wanted an AMA discharge but after explaining the reasons to stay and allowing the patient ambulation to the restroom independently, he agreed to stay. Diet was advanced as tolerated via enteral feeds - he was trialled on sips and chips prior to discharge. IV medication transitioned to oral as diet advanced. The drains were monitored and once the output was less than 30ml in a 24hr time frame they were removed accordingly. One penrose was kept in place in the neck, and he is to follow-up to have this removed. On the day of discharge, the pt was tolerating a diet, pain was controlled on PO pain medication, and they were ambulating and  voiding spontaneously. They were discharged to home health care in stable condition with instructions to follow up as outpatient.     Active Issues:  Oral cavity SCC  Acute blood loss anemia  HTN  HLD  CAD  CHF w/ EF of 30-35%  COPD  Hypothyroidism  CKD stage IIIa  PVD  JAE  GERD  Pulmonary nodules  Tobacco dependence  PTSD  Essential tremors  History of GI bleed 2013    Visit Vitals  /71   Pulse 70   Temp 36 °C (96.8 °F) (Temporal)   Resp 16     Vitals:    07/17/25 0608   Weight: 76.9 kg (169 lb 8.5 oz)       Immunization History   Administered Date(s) Administered    COVID-19, mRNA, LNP-S, PF, 30 mcg/0.3 mL dose 04/14/2021, 05/05/2021, 11/08/2021    Pfizer COVID-19 vaccine, 12 years and older, (30mcg/0.3mL) (Comirnaty) 10/24/2023, 12/12/2024    Pfizer COVID-19 vaccine, bivalent, age 12 years and older (30 mcg/0.3 mL) 11/30/2022    Pfizer Gray Cap SARS-CoV-2 06/02/2022       Results  Results for orders placed or performed during the hospital encounter of 07/17/25 (from the past 24 hours)   Electrocardiogram, 12-lead PRN ACS symptoms   Result Value Ref Range    Ventricular Rate 89 BPM    Atrial Rate 89 BPM    WY Interval 142 ms    QRS Duration 96 ms    QT Interval 360 ms    QTC Calculation(Bazett) 438 ms    P Axis 37 degrees    R Axis 50 degrees    T Axis 38 degrees    QRS Count 14 beats    Q Onset 222 ms    P Onset 151 ms    P Offset 197 ms    T Offset 402 ms    QTC Fredericia 410 ms   CBC   Result Value Ref Range    WBC 8.5 4.4 - 11.3 x10*3/uL    nRBC 0.2 (H) 0.0 - 0.0 /100 WBCs    RBC 2.51 (L) 4.50 - 5.90 x10*6/uL    Hemoglobin 8.0 (L) 13.5 - 17.5 g/dL    Hematocrit 25.6 (L) 41.0 - 52.0 %     (H) 80 - 100 fL    MCH 31.9 26.0 - 34.0 pg    MCHC 31.3 (L) 32.0 - 36.0 g/dL    RDW 14.7 (H) 11.5 - 14.5 %    Platelets 198 150 - 450 x10*3/uL   Renal function panel   Result Value Ref Range    Glucose 90 74 - 99 mg/dL    Sodium 139 136 - 145 mmol/L    Potassium 4.2 3.5 - 5.3 mmol/L    Chloride 105 98 - 107  mmol/L    Bicarbonate 27 21 - 32 mmol/L    Anion Gap 11 10 - 20 mmol/L    Urea Nitrogen 37 (H) 6 - 23 mg/dL    Creatinine 1.11 0.50 - 1.30 mg/dL    eGFR 68 >60 mL/min/1.73m*2    Calcium 10.3 8.6 - 10.6 mg/dL    Phosphorus 3.3 2.5 - 4.9 mg/dL    Albumin 3.5 3.4 - 5.0 g/dL   Magnesium   Result Value Ref Range    Magnesium 2.53 (H) 1.60 - 2.40 mg/dL      Electrocardiogram, 12-lead PRN ACS symptoms  Result Date: 7/22/2025  Sinus rhythm with occasional Premature ventricular complexes Low voltage QRS Cannot rule out Anteroseptal infarct (cited on or before 02-FEB-2007) T wave abnormality, consider lateral ischemia Abnormal ECG When compared with ECG of 21-JUL-2025 20:50, (unconfirmed) Fusion complexes are no longer Present MI interval has decreased QT has shortened         Pertinent Physical Exam At Time of Discharge  Physical Exam  Vitals reviewed in EMR  Gen: NAD, AOx3, resting inbed  Eyes: EOMI, sclera clear, PERRL  Ears: Normal external ears bilaterally  Nose: No rhinorrhea; anterior nares clear  Oral Cavity: All intraoral incisions c/d/i without evidence of dehiscence  -Flap with one spot of bruising - stable lateral area of bruising  Head: normocephalic, atraumatic  Face/Neck:  All incisions c/d/i, neck soft and flat without evidence of hematoma or fluid collection, penroses (x1) with appropriate output after milking - not much coming out with milking  -External doppler with strong biphasic arterial signal->Removed prior to discharge  Resp: Non-labored breathing on RA  Cards: Irregular on Doppler  Gastro: Soft, non-distended,  PEG tube in place   : Voiding  MSK: Left arm warm with intact movement and sensation  - thigh skin graft donor site  open to air  -moves all extremities  Psych: Appropriate affect  Drains: All drains in place with serosanguinous drainage    Home Medications     Medication List      START taking these medications     acetaminophen 325 mg tablet; Commonly known as: Tylenol; Take 3 tablets  "  (975 mg) by mouth every 8 hours.   aspirin 325 mg tablet; Take 1 tablet (325 mg) by mouth once daily for 25   doses. Do not fill before July 23, 2025.; Start taking on: July 23, 2025;   Replaces: aspirin 81 mg EC tablet   carvedilol 3.125 mg tablet; Commonly known as: Coreg; Take 1 tablet   (3.125 mg) by mouth 2 times a day.   eucerin cream; Apply topically if needed for dry skin. Apply to STSG   site, twice daily, once in the morning once in the pm.   gauze bandage 4 1/2 X 22 \" sponge; Apply 1 each topically once daily.   Dressing to free flap site is to be changed daily. Apply Xeroform then   cover with telfa. Wrap with kerlix to hold in place. Wrap with an ace   wrap.   hydrogen peroxide 3 % external solution; Apply 1 Application topically 3   times a day.   non-adherent bandage 3 X 8 \" bandage; Apply 1 each topically once daily.   Dressing to free flap site is to be changed daily. Apply Xeroform then   cover with telfa. Wrap with kerlix to hold in place. Wrap with an ace   wrap.   ondansetron 4 mg tablet; Commonly known as: Zofran; Take 1 tablet (4 mg)   by mouth every 8 hours if needed for nausea.   oxyCODONE 5 mg/5 mL solution; Commonly known as: Roxicodone; Take 5 mL   (5 mg) by mouth every 4 hours if needed for severe pain (7 - 10).   white petrolatum 41 % ointment; Commonly known as: Aquaphor; Apply 1   Application topically 3 times a day.   Xeroform 5 X 9 \" bandage; Generic drug: bismuth tribrom-petrolatum,wh;   Apply 1 each topically once daily. Dressing to free flap site is to be   changed daily. Apply Xeroform then cover with telfa. Wrap with kerlix to   hold in place. Wrap with an ace wrap.     CHANGE how you take these medications     chlorhexidine 0.12 % solution; Commonly known as: Peridex; Use 15 mL in   the mouth or throat three times daily.; Start taking on: July 23, 2025;   What changed: when to take this   furosemide 20 mg tablet; Commonly known as: Lasix; Take 1 tablet (20 mg)   by mouth " once daily as needed (Swelling, symptoms of excess volume).; What   changed: how much to take, when to take this, reasons to take this     CONTINUE taking these medications     albuterol 90 mcg/actuation inhaler   atorvastatin 40 mg tablet; Commonly known as: Lipitor   budesonide-glycopyr-formoterol 160-9-4.8 mcg/actuation HFA aerosol   inhaler; Commonly known as: BREZTRI   cholecalciferol 50 mcg (2,000 units) tablet; Commonly known as: Vitamin   D-3   guaiFENesin 600 mg 12 hr tablet; Commonly known as: Mucinex   imiquimod 5 % cream; Commonly known as: Aldara   ketoconazole 2 % cream; Commonly known as: NIZOral   omeprazole 20 mg DR capsule; Commonly known as: PriLOSEC   risedronate 150 mg tablet; Commonly known as: Actonel   sacubitriL-valsartan 24-26 mg tablet; Commonly known as: Entresto   spironolactone 25 mg tablet; Commonly known as: Aldactone     STOP taking these medications     aspirin 81 mg EC tablet; Replaced by: aspirin 325 mg tablet   metoprolol succinate XL 25 mg 24 hr tablet; Commonly known as: Toprol-XL   sildenafil 100 mg tablet; Commonly known as: Viagra       Outpatient Follow-Up  Future Appointments   Date Time Provider Department Center   7/23/2025 To Be Determined Bushra Sims RN [facility]   7/24/2025 To Be Determined Jaycee Marte PT [facility]   7/24/2025 To Be Determined Norma Hoffman OT [facility]   7/29/2025  9:45 AM Jamia Thomas MD [address]   8/8/2025 11:30 AM Fili Chacon MD [address]     Total time spent today was 45 minutes, all of which was spent coordinating patients discharge.      Nora Ballesteros APRN, CNP  Certified Family Nurse Practitioner  Nurse Practitioner III  Department of Otolaryngology: Head & Neck Surgery  Personal Pager [phone]  ENT Team  Head and Neck Phone: [phone]

## 2025-07-23 ENCOUNTER — HOME CARE VISIT (OUTPATIENT)
Dept: HOME HEALTH SERVICES | Facility: HOME HEALTH | Age: 80
End: 2025-07-23
Payer: MEDICARE

## 2025-07-23 VITALS
DIASTOLIC BLOOD PRESSURE: 62 MMHG | BODY MASS INDEX: 24.77 KG/M2 | HEIGHT: 70 IN | OXYGEN SATURATION: 95 % | SYSTOLIC BLOOD PRESSURE: 98 MMHG | TEMPERATURE: 97 F | HEART RATE: 72 BPM | RESPIRATION RATE: 16 BRPM | WEIGHT: 173 LBS

## 2025-07-23 LAB
ATRIAL RATE: 89 BPM
P AXIS: 37 DEGREES
P OFFSET: 197 MS
P ONSET: 151 MS
PR INTERVAL: 142 MS
Q ONSET: 222 MS
QRS COUNT: 14 BEATS
QRS DURATION: 96 MS
QT INTERVAL: 360 MS
QTC CALCULATION(BAZETT): 438 MS
QTC FREDERICIA: 410 MS
R AXIS: 50 DEGREES
T AXIS: 38 DEGREES
T OFFSET: 402 MS
VENTRICULAR RATE: 89 BPM

## 2025-07-23 PROCEDURE — G0299 HHS/HOSPICE OF RN EA 15 MIN: HCPCS | Mod: HHH

## 2025-07-23 ASSESSMENT — ENCOUNTER SYMPTOMS
PAIN: 1
STOOL FREQUENCY: DAILY
HIGHEST PAIN SEVERITY IN PAST 24 HOURS: 5/10
PAIN LOCATION - PAIN FREQUENCY: CONSTANT
PAIN LOCATION - RELIEVING FACTORS: REST/RX
PAIN LOCATION - PAIN DURATION: CONSTANT
SLEEP QUALITY: ADEQUATE
PERSON REPORTING PAIN: PATIENT
PAIN LOCATION - PAIN QUALITY: ACHE
LOWEST PAIN SEVERITY IN PAST 24 HOURS: 0/10
BOWEL PATTERN NORMAL: 1
OCCASIONAL FEELINGS OF UNSTEADINESS: 1
HYPERTENSION: 1
PAIN SEVERITY GOAL: 0/10
AGGRESSION WITHIN DEFINED LIMITS: 1
MUSCLE WEAKNESS: 1
CHANGE IN APPETITE: UNCHANGED
ANGER WITHIN DEFINED LIMITS: 1
PAIN LOCATION: LEFT ARM
LOSS OF SENSATION IN FEET: 0
PAIN LOCATION - EXACERBATING FACTORS: ACTIVITY
LAST BOWEL MOVEMENT: 67409
APPETITE LEVEL: FAIR
PAIN LOCATION - PAIN SEVERITY: 5/10
SUBJECTIVE PAIN PROGRESSION: UNCHANGED
DEPRESSION: 0

## 2025-07-23 ASSESSMENT — ACTIVITIES OF DAILY LIVING (ADL)
PHYSICAL_TRANSFERS_DEVICES: WALKER
PHYSICAL TRANSFERS ASSESSED: 1
ENTERING_EXITING_HOME: NEEDS ASSISTANCE
AMBULATION ASSISTANCE: 1
OASIS_M1830: 03
AMBULATION ASSISTANCE: STAND BY ASSIST
CURRENT_FUNCTION: STAND BY ASSIST

## 2025-07-23 ASSESSMENT — PAIN SCALES - PAIN ASSESSMENT IN ADVANCED DEMENTIA (PAINAD)
CONSOLABILITY: 0 - NO NEED TO CONSOLE.
CONSOLABILITY: 0
TOTALSCORE: 0
NEGVOCALIZATION: 0 - NONE.
FACIALEXPRESSION: 0 - SMILING OR INEXPRESSIVE.
BODYLANGUAGE: 0
BREATHING: 0
BODYLANGUAGE: 0 - RELAXED.
FACIALEXPRESSION: 0
NEGVOCALIZATION: 0

## 2025-07-24 ENCOUNTER — HOME CARE VISIT (OUTPATIENT)
Dept: HOME HEALTH SERVICES | Facility: HOME HEALTH | Age: 80
End: 2025-07-24
Payer: MEDICARE

## 2025-07-24 VITALS
HEART RATE: 74 BPM | DIASTOLIC BLOOD PRESSURE: 60 MMHG | SYSTOLIC BLOOD PRESSURE: 102 MMHG | TEMPERATURE: 97.5 F | OXYGEN SATURATION: 98 % | RESPIRATION RATE: 14 BRPM

## 2025-07-24 PROCEDURE — G0152 HHCP-SERV OF OT,EA 15 MIN: HCPCS | Mod: HHH

## 2025-07-24 PROCEDURE — G0151 HHCP-SERV OF PT,EA 15 MIN: HCPCS | Mod: HHH

## 2025-07-24 SDOH — ECONOMIC STABILITY: HOUSING INSECURITY
HOME SAFETY: AL NUMBNESS IN HIS LEFT THUMB, EDUCATED IN DIGIT ROM AND GRIP STRENGTH EXERCISES.  EDUACATED IN SAFE TECHNIQUES FOR SHOWER TRANSFERS, BENEFITS OF LH AE FOR BATHING AND HOME SAFETY/FALL PREVNETION.. CONTACTED NURSE THIS DATE AS SPOUSE WITH CONCERNS OF

## 2025-07-24 SDOH — HEALTH STABILITY: PHYSICAL HEALTH: PHYSICAL EXERCISE: SEATED

## 2025-07-24 SDOH — HEALTH STABILITY: PHYSICAL HEALTH: PHYSICAL EXERCISE: 15

## 2025-07-24 SDOH — HEALTH STABILITY: PHYSICAL HEALTH: EXERCISE ACTIVITIES SETS: 1

## 2025-07-24 SDOH — ECONOMIC STABILITY: HOUSING INSECURITY
HOME SAFETY: SEDIMENT IN PEG TUBE AFTER FEEDING. NURSE ADVISED PATIENT TO FLUSH TUBE AND SHE WILL FOLLOW UP WITH HER IN AN HOUR.   OT TO TX 1W1, 2W1 TO ESNURE SAFETY AND INDEP WITH ADLS TO DECREASE RISK OF FALLS AND DECREASE CAREGIVER BURDEN.

## 2025-07-24 SDOH — ECONOMIC STABILITY: HOUSING INSECURITY
HOME SAFETY: 78B YO MALE S/P HOSPITALIZATION FOLLOWING SURGERY FOR ORAL CANCER. ENTERAL FEEDING WITH NPO, OK FOR SIPS AND CHIPS. MAY SHOWER COVERING SKIN GRAFT SITES (LEFT FOREAMR AND LEFT THIGH)  PLOF: LIVES WITH WIFE IN 1 SH, STE WITH HR, NO AD, INDEP ADLS TRAN

## 2025-07-24 SDOH — HEALTH STABILITY: PHYSICAL HEALTH: EXERCISE ACTIVITY: ANKLE DF/PF

## 2025-07-24 SDOH — HEALTH STABILITY: PHYSICAL HEALTH: EXERCISE ACTIVITY: LAQ

## 2025-07-24 SDOH — HEALTH STABILITY: PHYSICAL HEALTH: EXERCISE TYPE: LE EXERCISES

## 2025-07-24 SDOH — HEALTH STABILITY: PHYSICAL HEALTH: EXERCISE ACTIVITY: MARCHING

## 2025-07-24 SDOH — ECONOMIC STABILITY: HOUSING INSECURITY
HOME SAFETY: SFERS AND MOBILITY, DRIVING  PATIENT IS NOW USING WHEELED WALKER FOR TRANSFERS AND MOBILITY, REQUIRING MIN A WITH BATHING SECONDARY TO PEG TUBE AND GRAFT SITES, MIN A UB DRESSING AND SUP LB DRESSING, WIFE COMPLETES ALL IADLS. PATIENT REPORTS OCCASION

## 2025-07-24 ASSESSMENT — ACTIVITIES OF DAILY LIVING (ADL)
BATHING_CURRENT_FUNCTION: MINIMUM ASSIST
AMBULATION ASSISTANCE: STAND BY ASSIST
PHYSICAL TRANSFERS ASSESSED: 1
BATHING ASSESSED: 1
CURRENT_FUNCTION: SUPERVISION
TOILETING: SUPERVISION
GROOMING ASSESSED: 1
AMBULATION ASSISTANCE: SUPERVISION
AMBULATION ASSISTANCE: 1
AMBULATION_DISTANCE/DURATION_TOLERATED: 50 FT
TOILETING: 1
AMBULATION ASSISTANCE ON FLAT SURFACES: 1
DRESSING_LB_CURRENT_FUNCTION: SUPERVISION
DRESSING_UB_CURRENT_FUNCTION: MINIMUM ASSIST
CURRENT_FUNCTION: STAND BY ASSIST
GROOMING_CURRENT_FUNCTION: SUPERVISION

## 2025-07-24 ASSESSMENT — BALANCE ASSESSMENTS
EYES CLOSED AT MAXIMUM POSITION NUDGED: 0 - UNSTEADY
STANDING BALANCE: 1 - STEADY BUT WIDE STANCE AND USES CANE OR OTHER SUPPORT
SITTING DOWN: 1 - USES ARMS OR NOT SMOOTH MOTION
BALANCE SCORE: 11
TURNING 360 DEGREES STEPS: 1 - CONTINUOUS STEPS
ARISING SCORE: 1
ATTEMPTS TO ARISE: 2 - ABLE TO RISE, ONE ATTEMPT
ARISES: 1 - ABLE, USES ARMS TO HELP
NUDGED: 1 - STAGGERS, GRABS, CATCHES SELF
NUDGED SCORE: 1
IMMEDIATE STANDING BALANCE FIRST 5 SECONDS: 2 - STEADY WITHOUT WALKER OR OTHER SUPPORT
SITTING BALANCE: 1 - STEADY, SAFE

## 2025-07-24 ASSESSMENT — GAIT ASSESSMENTS
STEP SYMMETRY: 1 - RIGHT AND LEFT STEP LENGTH APPEAR EQUAL
GAIT SCORE: 8
PATH SCORE: 0
TRUNK: 0 - MARKED SWAY OR USES WALKING AID
STEP CONTINUITY: 1 - STEPS APPEAR CONTINUOUS
WALKING STANCE: 1 - HEELS ALMOST TOUCHING WHILE WALKING
BALANCE AND GAIT SCORE: 19
INITIATION OF GAIT IMMEDIATELY AFTER GO: 1 - NO HESITANCY
TRUNK SCORE: 0
PATH: 0 - MARKED DEVIATION

## 2025-07-24 ASSESSMENT — ENCOUNTER SYMPTOMS
PAIN: 1
PAIN LOCATION - PAIN QUALITY: SORE
DENIES PAIN: 1
SUBJECTIVE PAIN PROGRESSION: UNCHANGED
PERSON REPORTING PAIN: PATIENT
MUSCLE WEAKNESS: 1
LOWEST PAIN SEVERITY IN PAST 24 HOURS: 0/10
PAIN LOCATION - PAIN FREQUENCY: CONSTANT
PERSON REPORTING PAIN: PATIENT
PAIN LOCATION - PAIN SEVERITY: 1/10
HIGHEST PAIN SEVERITY IN PAST 24 HOURS: 1/10
PAIN LOCATION: ABDOMEN
OCCASIONAL FEELINGS OF UNSTEADINESS: 1
PAIN LOCATION - PAIN DURATION: SINCE SURGERY
PAIN SEVERITY GOAL: 0/10

## 2025-07-25 ENCOUNTER — HOME CARE VISIT (OUTPATIENT)
Dept: HOME HEALTH SERVICES | Facility: HOME HEALTH | Age: 80
End: 2025-07-25
Payer: MEDICARE

## 2025-07-25 VITALS
RESPIRATION RATE: 16 BRPM | HEART RATE: 73 BPM | TEMPERATURE: 96.8 F | OXYGEN SATURATION: 97 % | SYSTOLIC BLOOD PRESSURE: 104 MMHG | DIASTOLIC BLOOD PRESSURE: 60 MMHG

## 2025-07-25 PROCEDURE — G0299 HHS/HOSPICE OF RN EA 15 MIN: HCPCS | Mod: HHH

## 2025-07-25 SDOH — ECONOMIC STABILITY: GENERAL

## 2025-07-25 ASSESSMENT — PAIN SCALES - PAIN ASSESSMENT IN ADVANCED DEMENTIA (PAINAD)
BODYLANGUAGE: 0 - RELAXED.
FACIALEXPRESSION: 2
FACIALEXPRESSION: 2 - FACIAL GRIMACING.
NEGVOCALIZATION: 0
TOTALSCORE: 2
CONSOLABILITY: 0 - NO NEED TO CONSOLE.
NEGVOCALIZATION: 0 - NONE.
BODYLANGUAGE: 0
CONSOLABILITY: 0
BREATHING: 0

## 2025-07-25 ASSESSMENT — ACTIVITIES OF DAILY LIVING (ADL)
AMBULATION ASSISTANCE: INDEPENDENT
PHYSICAL TRANSFERS ASSESSED: 1
PHYSICAL_TRANSFERS_DEVICES: WALKER
AMBULATION ASSISTANCE: 1
CURRENT_FUNCTION: INDEPENDENT
MONEY MANAGEMENT (EXPENSES/BILLS): INDEPENDENT

## 2025-07-25 ASSESSMENT — ENCOUNTER SYMPTOMS
BOWEL PATTERN NORMAL: 1
MUSCLE WEAKNESS: 1
LOWEST PAIN SEVERITY IN PAST 24 HOURS: 0/10
PAIN SEVERITY GOAL: 0/10
HIGHEST PAIN SEVERITY IN PAST 24 HOURS: 1/10
PERSON REPORTING PAIN: PATIENT
STOOL FREQUENCY: DAILY
LOSS OF SENSATION IN FEET: 0
APPETITE LEVEL: FAIR
PAIN LOCATION - PAIN QUALITY: ACHE
LAST BOWEL MOVEMENT: 67411
DEPRESSION: 0
PAIN LOCATION - PAIN SEVERITY: 1/10
CHANGE IN APPETITE: UNCHANGED
HYPERTENSION: 1
PAIN: 1
SUBJECTIVE PAIN PROGRESSION: UNCHANGED
PAIN LOCATION - RELIEVING FACTORS: REST/RX
PAIN LOCATION - PAIN DURATION: CONSTANT
PAIN LOCATION: LEFT ARM
OCCASIONAL FEELINGS OF UNSTEADINESS: 1
PAIN LOCATION - PAIN FREQUENCY: CONSTANT
PAIN LOCATION - EXACERBATING FACTORS: ACTIVITY

## 2025-07-28 ENCOUNTER — HOME CARE VISIT (OUTPATIENT)
Dept: HOME HEALTH SERVICES | Facility: HOME HEALTH | Age: 80
End: 2025-07-28
Payer: MEDICARE

## 2025-07-28 VITALS
SYSTOLIC BLOOD PRESSURE: 94 MMHG | OXYGEN SATURATION: 98 % | RESPIRATION RATE: 18 BRPM | TEMPERATURE: 98.5 F | DIASTOLIC BLOOD PRESSURE: 50 MMHG | HEART RATE: 55 BPM

## 2025-07-28 PROCEDURE — G0152 HHCP-SERV OF OT,EA 15 MIN: HCPCS | Mod: HHH

## 2025-07-28 PROCEDURE — G0157 HHC PT ASSISTANT EA 15: HCPCS | Mod: CQ,HHH

## 2025-07-28 ASSESSMENT — ENCOUNTER SYMPTOMS
PAIN LOCATION: MOUTH
PAIN LOCATION - PAIN QUALITY: DISCOMFORT
PAIN: 1
PAIN LOCATION: ABDOMEN
PAIN SEVERITY GOAL: 0/10
LOWEST PAIN SEVERITY IN PAST 24 HOURS: 0/10
PAIN LOCATION: LEFT ARM
HIGHEST PAIN SEVERITY IN PAST 24 HOURS: 1/10
PAIN LOCATION - PAIN QUALITY: DISCOMFORT
PERSON REPORTING PAIN: PATIENT
PAIN LOCATION - PAIN QUALITY: DISCOMFORT
SUBJECTIVE PAIN PROGRESSION: GRADUALLY IMPROVING

## 2025-07-28 ASSESSMENT — PAIN DESCRIPTION - PAIN TYPE: TYPE: ACUTE PAIN;SURGICAL PAIN

## 2025-07-29 ENCOUNTER — APPOINTMENT (OUTPATIENT)
Dept: LAB | Facility: HOSPITAL | Age: 80
End: 2025-07-29
Payer: MEDICARE

## 2025-07-29 ENCOUNTER — HOME CARE VISIT (OUTPATIENT)
Dept: HOME HEALTH SERVICES | Facility: HOME HEALTH | Age: 80
End: 2025-07-29
Payer: MEDICARE

## 2025-07-29 ENCOUNTER — OFFICE VISIT (OUTPATIENT)
Dept: OTOLARYNGOLOGY | Facility: HOSPITAL | Age: 80
End: 2025-07-29
Payer: MEDICARE

## 2025-07-29 ENCOUNTER — TELEPHONE (OUTPATIENT)
Dept: OTOLARYNGOLOGY | Facility: HOSPITAL | Age: 80
End: 2025-07-29
Payer: MEDICARE

## 2025-07-29 ENCOUNTER — HOSPITAL ENCOUNTER (INPATIENT)
Age: 80
LOS: 2 days | Discharge: HOME | DRG: 641 | End: 2025-07-31
Attending: EMERGENCY MEDICINE | Admitting: INTERNAL MEDICINE
Payer: OTHER GOVERNMENT

## 2025-07-29 ENCOUNTER — APPOINTMENT (OUTPATIENT)
Dept: GENERAL RADIOLOGY | Age: 80
DRG: 641 | End: 2025-07-29
Payer: OTHER GOVERNMENT

## 2025-07-29 VITALS — BODY MASS INDEX: 24.82 KG/M2 | TEMPERATURE: 98.2 F | HEIGHT: 70 IN

## 2025-07-29 DIAGNOSIS — N28.9 ACUTE RENAL INSUFFICIENCY: Primary | ICD-10-CM

## 2025-07-29 DIAGNOSIS — C06.9 CANCER OF ORAL CAVITY (MULTI): ICD-10-CM

## 2025-07-29 DIAGNOSIS — E87.1 HYPONATREMIA: ICD-10-CM

## 2025-07-29 DIAGNOSIS — Z91.89 AT RISK FOR MALNUTRITION: ICD-10-CM

## 2025-07-29 DIAGNOSIS — N17.9 AKI (ACUTE KIDNEY INJURY): ICD-10-CM

## 2025-07-29 DIAGNOSIS — Z93.1 G TUBE FEEDINGS (MULTI): Primary | ICD-10-CM

## 2025-07-29 DIAGNOSIS — E87.5 HYPERKALEMIA: ICD-10-CM

## 2025-07-29 PROBLEM — E86.0 DEHYDRATION: Status: ACTIVE | Noted: 2025-07-29

## 2025-07-29 LAB
ABO GROUP (TYPE) IN BLOOD: NORMAL
ALBUMIN SERPL BCP-MCNC: 3.5 G/DL (ref 3.4–5)
ALP SERPL-CCNC: 52 U/L (ref 33–136)
ALT SERPL W P-5'-P-CCNC: 18 U/L (ref 10–52)
ANION GAP SERPL CALC-SCNC: 12 MMOL/L (ref 10–20)
ANION GAP SERPL CALCULATED.3IONS-SCNC: 11 MEQ/L (ref 9–15)
ANTIBODY SCREEN: NORMAL
AST SERPL W P-5'-P-CCNC: 11 U/L (ref 9–39)
BILIRUB SERPL-MCNC: 0.5 MG/DL (ref 0–1.2)
BILIRUB UR QL STRIP: NEGATIVE
BUN SERPL-MCNC: 29 MG/DL (ref 8–23)
BUN SERPL-MCNC: 30 MG/DL (ref 6–23)
CALCIUM SERPL-MCNC: 10.2 MG/DL (ref 8.5–9.9)
CALCIUM SERPL-MCNC: 9.8 MG/DL (ref 8.6–10.3)
CHLORIDE SERPL-SCNC: 100 MMOL/L (ref 98–107)
CHLORIDE SERPL-SCNC: 99 MEQ/L (ref 95–107)
CLARITY UR: CLEAR
CO2 SERPL-SCNC: 23 MEQ/L (ref 20–31)
CO2 SERPL-SCNC: 27 MMOL/L (ref 21–32)
COLOR UR: YELLOW
CREAT SERPL-MCNC: 1.7 MG/DL (ref 0.7–1.2)
CREAT SERPL-MCNC: 1.82 MG/DL (ref 0.5–1.3)
EGFRCR SERPLBLD CKD-EPI 2021: 38 ML/MIN/1.73M*2
EKG ATRIAL RATE: 78 BPM
EKG DIAGNOSIS: NORMAL
EKG P-R INTERVAL: 264 MS
EKG Q-T INTERVAL: 370 MS
EKG QRS DURATION: 86 MS
EKG QTC CALCULATION (BAZETT): 421 MS
EKG R AXIS: 67 DEGREES
EKG T AXIS: 12 DEGREES
EKG VENTRICULAR RATE: 78 BPM
ERYTHROCYTE [DISTWIDTH] IN BLOOD BY AUTOMATED COUNT: 16.3 % (ref 11.5–14.5)
GLUCOSE SERPL-MCNC: 83 MG/DL (ref 74–99)
GLUCOSE SERPL-MCNC: 85 MG/DL (ref 70–99)
GLUCOSE UR STRIP-MCNC: NEGATIVE MG/DL
HCT VFR BLD AUTO: 27.6 % (ref 41–52)
HGB BLD-MCNC: 9 G/DL (ref 13.5–17.5)
HGB UR QL STRIP: NEGATIVE
KETONES UR STRIP-MCNC: NORMAL MG/DL
LEUKOCYTE ESTERASE UR QL STRIP: NEGATIVE
MCH RBC QN AUTO: 32.8 PG (ref 26–34)
MCHC RBC AUTO-ENTMCNC: 32.6 G/DL (ref 32–36)
MCV RBC AUTO: 101 FL (ref 80–100)
NITRITE UR QL STRIP: NEGATIVE
NRBC BLD-RTO: 0 /100 WBCS (ref 0–0)
PH UR STRIP: 8.5 [PH] (ref 5–9)
PLATELET # BLD AUTO: 308 X10*3/UL (ref 150–450)
POTASSIUM SERPL-SCNC: 5.5 MEQ/L (ref 3.4–4.9)
POTASSIUM SERPL-SCNC: 5.6 MMOL/L (ref 3.5–5.3)
PROT SERPL-MCNC: 6.2 G/DL (ref 6.4–8.2)
PROT UR STRIP-MCNC: NEGATIVE MG/DL
RBC # BLD AUTO: 2.74 X10*6/UL (ref 4.5–5.9)
RH FACTOR (ANTIGEN D): NORMAL
SODIUM SERPL-SCNC: 133 MEQ/L (ref 135–144)
SODIUM SERPL-SCNC: 133 MMOL/L (ref 136–145)
SP GR UR STRIP: 1.02 (ref 1–1.03)
TROPONIN, HIGH SENSITIVITY: 30 NG/L (ref 0–19)
TROPONIN, HIGH SENSITIVITY: 34 NG/L (ref 0–19)
URINE REFLEX TO CULTURE: NORMAL
UROBILINOGEN UR STRIP-ACNC: 1 E.U./DL
WBC # BLD AUTO: 9.9 X10*3/UL (ref 4.4–11.3)

## 2025-07-29 PROCEDURE — 6370000000 HC RX 637 (ALT 250 FOR IP): Performed by: EMERGENCY MEDICINE

## 2025-07-29 PROCEDURE — 94640 AIRWAY INHALATION TREATMENT: CPT

## 2025-07-29 PROCEDURE — 96360 HYDRATION IV INFUSION INIT: CPT

## 2025-07-29 PROCEDURE — 80048 BASIC METABOLIC PNL TOTAL CA: CPT

## 2025-07-29 PROCEDURE — 1111F DSCHRG MED/CURRENT MED MERGE: CPT | Performed by: STUDENT IN AN ORGANIZED HEALTH CARE EDUCATION/TRAINING PROGRAM

## 2025-07-29 PROCEDURE — 2580000003 HC RX 258: Performed by: INTERNAL MEDICINE

## 2025-07-29 PROCEDURE — 2500000003 HC RX 250 WO HCPCS: Performed by: INTERNAL MEDICINE

## 2025-07-29 PROCEDURE — 84075 ASSAY ALKALINE PHOSPHATASE: CPT | Performed by: STUDENT IN AN ORGANIZED HEALTH CARE EDUCATION/TRAINING PROGRAM

## 2025-07-29 PROCEDURE — 1210000000 HC MED SURG R&B

## 2025-07-29 PROCEDURE — 1126F AMNT PAIN NOTED NONE PRSNT: CPT | Performed by: STUDENT IN AN ORGANIZED HEALTH CARE EDUCATION/TRAINING PROGRAM

## 2025-07-29 PROCEDURE — 84484 ASSAY OF TROPONIN QUANT: CPT

## 2025-07-29 PROCEDURE — 6360000002 HC RX W HCPCS: Performed by: EMERGENCY MEDICINE

## 2025-07-29 PROCEDURE — 36415 COLL VENOUS BLD VENIPUNCTURE: CPT

## 2025-07-29 PROCEDURE — 85027 COMPLETE CBC AUTOMATED: CPT | Performed by: STUDENT IN AN ORGANIZED HEALTH CARE EDUCATION/TRAINING PROGRAM

## 2025-07-29 PROCEDURE — 81003 URINALYSIS AUTO W/O SCOPE: CPT

## 2025-07-29 PROCEDURE — 36415 COLL VENOUS BLD VENIPUNCTURE: CPT | Performed by: STUDENT IN AN ORGANIZED HEALTH CARE EDUCATION/TRAINING PROGRAM

## 2025-07-29 PROCEDURE — 2580000003 HC RX 258: Performed by: EMERGENCY MEDICINE

## 2025-07-29 PROCEDURE — 93005 ELECTROCARDIOGRAM TRACING: CPT

## 2025-07-29 PROCEDURE — 99285 EMERGENCY DEPT VISIT HI MDM: CPT

## 2025-07-29 PROCEDURE — 99211 OFF/OP EST MAY X REQ PHY/QHP: CPT | Mod: 24 | Performed by: STUDENT IN AN ORGANIZED HEALTH CARE EDUCATION/TRAINING PROGRAM

## 2025-07-29 PROCEDURE — 71045 X-RAY EXAM CHEST 1 VIEW: CPT

## 2025-07-29 PROCEDURE — 6370000000 HC RX 637 (ALT 250 FOR IP): Performed by: INTERNAL MEDICINE

## 2025-07-29 PROCEDURE — 94664 DEMO&/EVAL PT USE INHALER: CPT

## 2025-07-29 PROCEDURE — 2500000003 HC RX 250 WO HCPCS: Performed by: EMERGENCY MEDICINE

## 2025-07-29 PROCEDURE — 86900 BLOOD TYPING SEROLOGIC ABO: CPT | Performed by: OTOLARYNGOLOGY

## 2025-07-29 PROCEDURE — 93005 ELECTROCARDIOGRAM TRACING: CPT | Performed by: EMERGENCY MEDICINE

## 2025-07-29 PROCEDURE — 1159F MED LIST DOCD IN RCRD: CPT | Performed by: STUDENT IN AN ORGANIZED HEALTH CARE EDUCATION/TRAINING PROGRAM

## 2025-07-29 RX ORDER — LORAZEPAM 2 MG/ML
1 INJECTION INTRAMUSCULAR EVERY 4 HOURS PRN
Status: DISCONTINUED | OUTPATIENT
Start: 2025-07-29 | End: 2025-07-31 | Stop reason: HOSPADM

## 2025-07-29 RX ORDER — 0.9 % SODIUM CHLORIDE 0.9 %
1000 INTRAVENOUS SOLUTION INTRAVENOUS ONCE
Status: COMPLETED | OUTPATIENT
Start: 2025-07-29 | End: 2025-07-29

## 2025-07-29 RX ORDER — METOPROLOL SUCCINATE 25 MG/1
12.5 TABLET, EXTENDED RELEASE ORAL 2 TIMES DAILY
COMMUNITY

## 2025-07-29 RX ORDER — ACETAMINOPHEN 650 MG/1
650 SUPPOSITORY RECTAL EVERY 6 HOURS PRN
Status: DISCONTINUED | OUTPATIENT
Start: 2025-07-29 | End: 2025-07-31 | Stop reason: HOSPADM

## 2025-07-29 RX ORDER — ONDANSETRON 4 MG/1
4 TABLET, ORALLY DISINTEGRATING ORAL EVERY 8 HOURS PRN
Status: DISCONTINUED | OUTPATIENT
Start: 2025-07-29 | End: 2025-07-31 | Stop reason: HOSPADM

## 2025-07-29 RX ORDER — 0.9 % SODIUM CHLORIDE 0.9 %
500 INTRAVENOUS SOLUTION INTRAVENOUS ONCE
Status: DISCONTINUED | OUTPATIENT
Start: 2025-07-29 | End: 2025-07-29

## 2025-07-29 RX ORDER — ATORVASTATIN CALCIUM 40 MG/1
40 TABLET, FILM COATED ORAL DAILY
Status: DISCONTINUED | OUTPATIENT
Start: 2025-07-29 | End: 2025-07-31 | Stop reason: HOSPADM

## 2025-07-29 RX ORDER — FUROSEMIDE 10 MG/ML
20 INJECTION INTRAMUSCULAR; INTRAVENOUS ONCE
Status: COMPLETED | OUTPATIENT
Start: 2025-07-29 | End: 2025-07-29

## 2025-07-29 RX ORDER — POLYETHYLENE GLYCOL 3350 17 G/17G
17 POWDER, FOR SOLUTION ORAL DAILY PRN
Status: DISCONTINUED | OUTPATIENT
Start: 2025-07-29 | End: 2025-07-31 | Stop reason: HOSPADM

## 2025-07-29 RX ORDER — SODIUM CHLORIDE 9 MG/ML
INJECTION, SOLUTION INTRAVENOUS CONTINUOUS
Status: DISCONTINUED | OUTPATIENT
Start: 2025-07-29 | End: 2025-07-31 | Stop reason: HOSPADM

## 2025-07-29 RX ORDER — SODIUM CHLORIDE 0.9 % (FLUSH) 0.9 %
5-40 SYRINGE (ML) INJECTION EVERY 12 HOURS SCHEDULED
Status: DISCONTINUED | OUTPATIENT
Start: 2025-07-29 | End: 2025-07-31 | Stop reason: HOSPADM

## 2025-07-29 RX ORDER — ONDANSETRON 2 MG/ML
4 INJECTION INTRAMUSCULAR; INTRAVENOUS EVERY 6 HOURS PRN
Status: DISCONTINUED | OUTPATIENT
Start: 2025-07-29 | End: 2025-07-31 | Stop reason: HOSPADM

## 2025-07-29 RX ORDER — RISEDRONATE SODIUM 30 MG/1
150 TABLET, FILM COATED ORAL
COMMUNITY

## 2025-07-29 RX ORDER — ALBUTEROL SULFATE 0.83 MG/ML
2.5 SOLUTION RESPIRATORY (INHALATION) EVERY 6 HOURS PRN
Status: DISCONTINUED | OUTPATIENT
Start: 2025-07-29 | End: 2025-07-31 | Stop reason: HOSPADM

## 2025-07-29 RX ORDER — INDOMETHACIN 25 MG/1
50 CAPSULE ORAL ONCE
Status: COMPLETED | OUTPATIENT
Start: 2025-07-29 | End: 2025-07-29

## 2025-07-29 RX ORDER — ASPIRIN 81 MG/1
81 TABLET ORAL DAILY
Status: DISCONTINUED | OUTPATIENT
Start: 2025-07-29 | End: 2025-07-31 | Stop reason: HOSPADM

## 2025-07-29 RX ORDER — QUETIAPINE FUMARATE 25 MG/1
25 TABLET, FILM COATED ORAL ONCE
Status: COMPLETED | OUTPATIENT
Start: 2025-07-29 | End: 2025-07-29

## 2025-07-29 RX ORDER — SODIUM CHLORIDE 9 MG/ML
INJECTION, SOLUTION INTRAVENOUS PRN
Status: DISCONTINUED | OUTPATIENT
Start: 2025-07-29 | End: 2025-07-31 | Stop reason: HOSPADM

## 2025-07-29 RX ORDER — MORPHINE SULFATE 2 MG/ML
2 INJECTION, SOLUTION INTRAMUSCULAR; INTRAVENOUS
Status: DISCONTINUED | OUTPATIENT
Start: 2025-07-29 | End: 2025-07-31 | Stop reason: HOSPADM

## 2025-07-29 RX ORDER — PANTOPRAZOLE SODIUM 40 MG/1
40 TABLET, DELAYED RELEASE ORAL
Status: DISCONTINUED | OUTPATIENT
Start: 2025-07-30 | End: 2025-07-31 | Stop reason: HOSPADM

## 2025-07-29 RX ORDER — ACETAMINOPHEN 325 MG/1
650 TABLET ORAL EVERY 6 HOURS PRN
Status: DISCONTINUED | OUTPATIENT
Start: 2025-07-29 | End: 2025-07-31 | Stop reason: HOSPADM

## 2025-07-29 RX ADMIN — SODIUM POLYSTYRENE SULFONATE 15 G: 15 SUSPENSION ORAL at 16:00

## 2025-07-29 RX ADMIN — SODIUM CHLORIDE 1000 ML: 0.9 INJECTION, SOLUTION INTRAVENOUS at 14:27

## 2025-07-29 RX ADMIN — ALBUTEROL SULFATE 2.5 MG: 2.5 SOLUTION RESPIRATORY (INHALATION) at 15:39

## 2025-07-29 RX ADMIN — FUROSEMIDE 20 MG: 10 INJECTION, SOLUTION INTRAMUSCULAR; INTRAVENOUS at 15:44

## 2025-07-29 RX ADMIN — Medication 10 ML: at 20:01

## 2025-07-29 RX ADMIN — SODIUM CHLORIDE: 0.9 INJECTION, SOLUTION INTRAVENOUS at 20:01

## 2025-07-29 RX ADMIN — SODIUM BICARBONATE 50 MEQ: 84 INJECTION, SOLUTION INTRAVENOUS at 15:44

## 2025-07-29 RX ADMIN — ASPIRIN 81 MG: 81 TABLET, DELAYED RELEASE ORAL at 20:01

## 2025-07-29 RX ADMIN — QUETIAPINE FUMARATE 25 MG: 25 TABLET ORAL at 20:01

## 2025-07-29 RX ADMIN — ATORVASTATIN CALCIUM 40 MG: 40 TABLET, FILM COATED ORAL at 20:01

## 2025-07-29 ASSESSMENT — ENCOUNTER SYMPTOMS
SHORTNESS OF BREATH: 1
DIARRHEA: 0
EYE REDNESS: 0
SORE THROAT: 0
SINUS PAIN: 0
ABDOMINAL PAIN: 0
NAUSEA: 0
VOMITING: 0
BACK PAIN: 0
COLOR CHANGE: 0
COUGH: 0
EYE DISCHARGE: 0

## 2025-07-29 ASSESSMENT — PAIN SCALES - GENERAL: PAINLEVEL_OUTOF10: 0-NO PAIN

## 2025-07-29 ASSESSMENT — LIFESTYLE VARIABLES
HOW MANY STANDARD DRINKS CONTAINING ALCOHOL DO YOU HAVE ON A TYPICAL DAY: 1 OR 2
HOW OFTEN DO YOU HAVE A DRINK CONTAINING ALCOHOL: MONTHLY OR LESS

## 2025-07-29 ASSESSMENT — PAIN - FUNCTIONAL ASSESSMENT: PAIN_FUNCTIONAL_ASSESSMENT: NONE - DENIES PAIN

## 2025-07-29 NOTE — TELEPHONE ENCOUNTER
Per Dr Thomas, called and spoke with wife Kristin about patients recent blood work today. Dr Thomas recommended that since the patients electrolytes and kidney function was off that he go to the ER to be evaluated. Explained that he may possibly just need some IV fluids but going to the ER would be best to be monitored. Pt's wife asked if they could go to any ER since they are already close to home and are going to [facility] in [place] near them. Dr Thomas updated as well.

## 2025-07-29 NOTE — PROGRESS NOTES
"HEAD AND NECK SURGERY POST OP  [facility]    HPI  I had the pleasure of seeing Shola Carbajal for a post-operative visit today. Patient is s/p triple endoscopy, PEG tube placement, mandibulectomy, left composite resection, skin graft, mandibular reconstruction, radial forearm free flap on 7/17 with Dr. Chacon and William.      He is accompanied by his wife who assists with history and treatment planning.    He has been doing well at home. Minimal pain. Increased fatigue over the last day. Doing water, ice chips and coffee by mouth. Minimal drainage from penrose site at neck, other penrose fell out on its own.   Tolerating tube feeds. No breathing difficulty    Physical Examination  Vitals:  Temp 36.8 °C (98.2 °F) (Temporal)   Ht 1.778 m (5' 10\")   BMI 24.82 kg/m²   General: He appears tired, in wheelchair   Oral Cavity:  The patient is able to open the mouth widely without trismus. left alveolar ridge/buccal flap healthy, suture line intact, improved swelling, minimal bruising  Oropharynx: There are no mucosal abnormalities noted within the oropharynx.       Salivary glands: There are no palpable masses of the parotid or submandibular glands.   Neuro: Cranial nerves 2-12 are without obvious abnormality.  Neck: left neck incision intact, healing well, improved edema, prior penrose site with ss drainage that does not track deeply    Extremities: left forearm bolster in place, incision intact, some bruising. Bolster removed to reveal skin graft with 100% take    Pathology: pending     ASSESSMENT and PLAN:    Left oral cavity SCC, now s/p OCCR, neck dissection, left RFFF 7/17/25 (Oswaldo/William)  At risk for malnutrition  G tube dependent  Dysphagia  Increased fatigue  Post-operative pain  - overall healing well from surgery  - reviewed wound care including vaseline to all incisions  - recommend xeroform and loose kerlix changed daily to the left forearm  - Continue g tube feeds  - ok to increase oral " intake, continue liquids/full liquid diet until follow up  - I am concerned by his increased fatigue, overall surgical sites appear healthy without concern for fistula or infection. Will start with labs to determine if electrolyte abnormality is contributing in the setting of tube feeds    Follow up as planned in 2 weeks    ADDENDUM  Labs showed elevated Cr, low Na and elevated K. Recommend presenting to hospital for fluids, further work up and/or admission. I am concerned about KULWANT and electrolyte imbalance in setting of tube feeds, no signs of surgical site abnormality that would require intervention    Jamia Thomas MD

## 2025-07-29 NOTE — ED PROVIDER NOTES
[facility] EMERGENCY DEPARTMENT  EMERGENCY DEPARTMENT ENCOUNTER      Pt Name: Luciano Crowlel  MRN: [id number]  Birthdate 1945  Date of evaluation: 7/29/2025  Provider: Suzette Dupree DO  3:48 PM    CHIEF COMPLAINT       Chief Complaint   Patient presents with    Dehydration     Pt had Medullectomy. Now lethargic. PCP sent pt for dehydration due to labs     Chief complaint: Abnormal electrolytes per family  History of chief complaint: This 80-year-old gentleman presents to the emergency department brought in by family after they received a call from the family doctor advising them to come to the ER.  Wife states patient has been in the hospital up at [facility] had a cancer in the left jaw resected states they did take skin from the left wrist area for repair.  Patient had a feeding tube placed and was able to go home.  Wife states that she has been changing the dressings regular, they rechecked today and was looking good.  Wife states she has been giving the tube feedings regular states he can have liquids by mouth water and black coffee which she has been having but not drinking that much thought maybe he was getting dehydrated.  Wife states he has just been taking extra strength Tylenol for the most part for his pain but was having trouble sleeping states she did give him the Percocet pain pill the last 2 nights and is uncertain if that is making him a little more drowsy this morning.  Patient states he is achy all over but no focal pain no head or neck pain no chest pain palpitation states he is chronically short of breath with COPD not new or different patient denies any abdominal pain no vomiting or diarrhea.  Patient does report frequent urination, wife states he has been going frequently and having incontinence of urine in the last couple days.  No leg pain or swelling.    Nursing Notes were reviewed.    REVIEW OF SYSTEMS       Review of Systems   Constitutional:  Negative for chills, diaphoresis and fever.

## 2025-07-30 VITALS
TEMPERATURE: 98.2 F | DIASTOLIC BLOOD PRESSURE: 55 MMHG | SYSTOLIC BLOOD PRESSURE: 112 MMHG | BODY MASS INDEX: 23.62 KG/M2 | HEIGHT: 70 IN | WEIGHT: 165 LBS | OXYGEN SATURATION: 97 % | RESPIRATION RATE: 18 BRPM | HEART RATE: 61 BPM

## 2025-07-30 LAB
ANION GAP SERPL CALCULATED.3IONS-SCNC: 11 MEQ/L (ref 9–15)
BASOPHILS # BLD: 0.1 K/UL (ref 0–0.1)
BASOPHILS NFR BLD: 0.6 % (ref 0.2–1.2)
BUN SERPL-MCNC: 28 MG/DL (ref 8–23)
CALCIUM SERPL-MCNC: 9.3 MG/DL (ref 8.5–9.9)
CHLORIDE SERPL-SCNC: 102 MEQ/L (ref 95–107)
CO2 SERPL-SCNC: 22 MEQ/L (ref 20–31)
CREAT SERPL-MCNC: 1.5 MG/DL (ref 0.7–1.2)
EOSINOPHIL # BLD: 0.1 K/UL (ref 0–0.5)
EOSINOPHIL NFR BLD: 1.4 % (ref 0.8–7)
ERYTHROCYTE [DISTWIDTH] IN BLOOD BY AUTOMATED COUNT: 16.2 % (ref 11.6–14.4)
GLUCOSE SERPL-MCNC: 85 MG/DL (ref 70–99)
HCT VFR BLD AUTO: 25.5 % (ref 42–52)
HGB BLD-MCNC: 8.2 G/DL (ref 13.7–17.5)
IMM GRANULOCYTES # BLD: 0 K/UL
IMM GRANULOCYTES NFR BLD: 0.5 %
LYMPHOCYTES # BLD: 1.3 K/UL (ref 1.3–3.6)
LYMPHOCYTES NFR BLD: 14.5 %
MCH RBC QN AUTO: 32.2 PG (ref 25.7–32.2)
MCHC RBC AUTO-ENTMCNC: 32.2 % (ref 32.3–36.5)
MCV RBC AUTO: 100 FL (ref 79–92.2)
MONOCYTES # BLD: 0.7 K/UL (ref 0.3–0.8)
MONOCYTES NFR BLD: 7.6 % (ref 5.3–12.2)
NEUTROPHILS # BLD: 6.5 K/UL (ref 1.8–5.4)
NEUTS SEG NFR BLD: 75.4 % (ref 34–67.9)
PLATELET # BLD AUTO: 266 K/UL (ref 163–337)
POTASSIUM SERPL-SCNC: 4 MEQ/L (ref 3.4–4.9)
RBC # BLD AUTO: 2.55 M/UL (ref 4.63–6.08)
SODIUM SERPL-SCNC: 135 MEQ/L (ref 135–144)
WBC # BLD AUTO: 8.7 K/UL (ref 4.2–9)

## 2025-07-30 PROCEDURE — 97162 PT EVAL MOD COMPLEX 30 MIN: CPT

## 2025-07-30 PROCEDURE — 94664 DEMO&/EVAL PT USE INHALER: CPT

## 2025-07-30 PROCEDURE — 94760 N-INVAS EAR/PLS OXIMETRY 1: CPT

## 2025-07-30 PROCEDURE — 6370000000 HC RX 637 (ALT 250 FOR IP): Performed by: INTERNAL MEDICINE

## 2025-07-30 PROCEDURE — 36415 COLL VENOUS BLD VENIPUNCTURE: CPT

## 2025-07-30 PROCEDURE — 94640 AIRWAY INHALATION TREATMENT: CPT

## 2025-07-30 PROCEDURE — 2500000003 HC RX 250 WO HCPCS: Performed by: INTERNAL MEDICINE

## 2025-07-30 PROCEDURE — 85025 COMPLETE CBC W/AUTO DIFF WBC: CPT

## 2025-07-30 PROCEDURE — 80048 BASIC METABOLIC PNL TOTAL CA: CPT

## 2025-07-30 PROCEDURE — 1210000000 HC MED SURG R&B

## 2025-07-30 PROCEDURE — 2580000003 HC RX 258: Performed by: INTERNAL MEDICINE

## 2025-07-30 RX ORDER — QUETIAPINE FUMARATE 25 MG/1
12.5 TABLET, FILM COATED ORAL NIGHTLY
Status: DISCONTINUED | OUTPATIENT
Start: 2025-07-30 | End: 2025-07-31 | Stop reason: HOSPADM

## 2025-07-30 RX ADMIN — SODIUM CHLORIDE: 0.9 INJECTION, SOLUTION INTRAVENOUS at 05:29

## 2025-07-30 RX ADMIN — ATORVASTATIN CALCIUM 40 MG: 40 TABLET, FILM COATED ORAL at 08:37

## 2025-07-30 RX ADMIN — PANTOPRAZOLE SODIUM 40 MG: 40 TABLET, DELAYED RELEASE ORAL at 05:25

## 2025-07-30 RX ADMIN — SODIUM CHLORIDE: 0.9 INJECTION, SOLUTION INTRAVENOUS at 16:08

## 2025-07-30 RX ADMIN — Medication 5 ML: at 11:16

## 2025-07-30 RX ADMIN — TIOTROPIUM BROMIDE AND OLODATEROL 2 PUFF: 3.124; 2.736 SPRAY, METERED RESPIRATORY (INHALATION) at 06:07

## 2025-07-30 RX ADMIN — ASPIRIN 81 MG: 81 TABLET, DELAYED RELEASE ORAL at 08:38

## 2025-07-30 RX ADMIN — QUETIAPINE FUMARATE 12.5 MG: 25 TABLET ORAL at 20:05

## 2025-07-30 NOTE — PLAN OF CARE
Nutrition Problem #1: Inadequate oral intake  Intervention: Food and/or Nutrient Delivery: Continue Current Diet, Continue Current Tube Feeding (as per home routine)

## 2025-07-30 NOTE — H&P
MD Sussy   sildenafil (VIAGRA) 100 MG tablet Take 100 mg by mouth as needed for Erectile Dysfunction    Provider, MD Cristine   nitroGLYCERIN (NITROSTAT) 0.4 MG SL tablet Place 0.4 mg under the tongue every 5 minutes as needed.      Provider, MD Cristine       Allergies:  Cat dander, Heparin, Lovenox [enoxaparin], and Plavix [clopidogrel]    Social History:      The patient currently lives home     TOBACCO:   reports that he has been smoking cigarettes. He started smoking about 63 years ago. He has a 47.5 pack-year smoking history. He has never used smokeless tobacco.  ETOH:   reports that he does not currently use alcohol.      Family History:       Reviewed in detail and negative for DM, CAD, Cancer, CVA. Positive as follows:        Problem Relation Age of Onset    Heart Attack Mother     Heart Attack Father        REVIEW OF SYSTEMS:   Pertinent positives as noted in the HPI. All other systems reviewed and negative.    PHYSICAL EXAM:    /64   Pulse 60   Temp 98.6 °F (37 °C)   Resp 18   Ht 1.778 m (5' 10\")   Wt 74.8 kg (165 lb)   SpO2 96%   BMI 23.68 kg/m²     General appearance:looks sleepy   No apparent distress, appears stated age and cooperative.  HEENT:  Normal cephalic, atraumatic without obvious deformity. Pupils equal, round, and reactive to light.  Extra ocular muscles intact. Conjunctivae/corneas clear.  Neck: Supple, with full range of motion. No jugular venous distention. Trachea midline.  Respiratory:  Normal respiratory effort. Clear to auscultation, bilaterally without Rales/Wheezes/Rhonchi.  Cardiovascular:  Regular rate and rhythm with normal S1/S2 without murmurs, rubs or gallops.  Abdomen: PEG in place  Soft, non-tender, non-distended with normal bowel sounds.  Musculoskeletal:  No clubbing, cyanosis or edema bilaterally.     Skin: Skin color, texture, turgor normal.  No rashes or lesions.  Neurologic:  Neurovascularly intact without any focal sensory/motor deficits.

## 2025-07-31 ENCOUNTER — HOME CARE VISIT (OUTPATIENT)
Dept: HOME HEALTH SERVICES | Facility: HOME HEALTH | Age: 80
End: 2025-07-31
Payer: MEDICARE

## 2025-07-31 LAB
ANION GAP SERPL CALCULATED.3IONS-SCNC: 9 MEQ/L (ref 9–15)
BASOPHILS # BLD: 0 K/UL (ref 0–0.1)
BASOPHILS NFR BLD: 0.4 % (ref 0.2–1.2)
BUN SERPL-MCNC: 21 MG/DL (ref 8–23)
CALCIUM SERPL-MCNC: 9.2 MG/DL (ref 8.5–9.9)
CHLORIDE SERPL-SCNC: 108 MEQ/L (ref 95–107)
CO2 SERPL-SCNC: 20 MEQ/L (ref 20–31)
CREAT SERPL-MCNC: 1.2 MG/DL (ref 0.7–1.2)
EOSINOPHIL # BLD: 0.2 K/UL (ref 0–0.5)
EOSINOPHIL NFR BLD: 3.1 % (ref 0.8–7)
ERYTHROCYTE [DISTWIDTH] IN BLOOD BY AUTOMATED COUNT: 16 % (ref 11.6–14.4)
GLUCOSE SERPL-MCNC: 81 MG/DL (ref 70–99)
HCT VFR BLD AUTO: 25 % (ref 42–52)
HGB BLD-MCNC: 8.1 G/DL (ref 13.7–17.5)
IMM GRANULOCYTES # BLD: 0 K/UL
IMM GRANULOCYTES NFR BLD: 0.4 %
LAB AP BLOCK FOR ADDITIONAL STUDIES: NORMAL
LABORATORY COMMENT REPORT: NORMAL
LYMPHOCYTES # BLD: 1.2 K/UL (ref 1.3–3.6)
LYMPHOCYTES NFR BLD: 16.3 %
Lab: NORMAL
MCH RBC QN AUTO: 32.8 PG (ref 25.7–32.2)
MCHC RBC AUTO-ENTMCNC: 32.4 % (ref 32.3–36.5)
MCV RBC AUTO: 101.2 FL (ref 79–92.2)
MONOCYTES # BLD: 0.6 K/UL (ref 0.3–0.8)
MONOCYTES NFR BLD: 8 % (ref 5.3–12.2)
NEUTROPHILS # BLD: 5.3 K/UL (ref 1.8–5.4)
NEUTS SEG NFR BLD: 71.8 % (ref 34–67.9)
PATH REPORT.FINAL DX SPEC: NORMAL
PATH REPORT.GROSS SPEC: NORMAL
PATH REPORT.RELEVANT HX SPEC: NORMAL
PATH REPORT.TOTAL CANCER: NORMAL
PATHOLOGY SYNOPTIC REPORT: NORMAL
PLATELET # BLD AUTO: 242 K/UL (ref 163–337)
POTASSIUM SERPL-SCNC: 4.3 MEQ/L (ref 3.4–4.9)
RBC # BLD AUTO: 2.47 M/UL (ref 4.63–6.08)
SODIUM SERPL-SCNC: 137 MEQ/L (ref 135–144)
WBC # BLD AUTO: 7.4 K/UL (ref 4.2–9)

## 2025-07-31 PROCEDURE — 6370000000 HC RX 637 (ALT 250 FOR IP): Performed by: INTERNAL MEDICINE

## 2025-07-31 PROCEDURE — 94640 AIRWAY INHALATION TREATMENT: CPT

## 2025-07-31 PROCEDURE — 36415 COLL VENOUS BLD VENIPUNCTURE: CPT

## 2025-07-31 PROCEDURE — 80048 BASIC METABOLIC PNL TOTAL CA: CPT

## 2025-07-31 PROCEDURE — 2580000003 HC RX 258: Performed by: INTERNAL MEDICINE

## 2025-07-31 PROCEDURE — 85025 COMPLETE CBC W/AUTO DIFF WBC: CPT

## 2025-07-31 RX ORDER — QUETIAPINE FUMARATE 25 MG/1
12.5 TABLET, FILM COATED ORAL NIGHTLY
Qty: 15 TABLET | Refills: 3 | Status: SHIPPED | OUTPATIENT
Start: 2025-07-31

## 2025-07-31 RX ADMIN — SODIUM CHLORIDE: 0.9 INJECTION, SOLUTION INTRAVENOUS at 01:15

## 2025-07-31 RX ADMIN — TIOTROPIUM BROMIDE AND OLODATEROL 2 PUFF: 3.124; 2.736 SPRAY, METERED RESPIRATORY (INHALATION) at 06:32

## 2025-07-31 RX ADMIN — ASPIRIN 81 MG: 81 TABLET, DELAYED RELEASE ORAL at 07:45

## 2025-07-31 RX ADMIN — ATORVASTATIN CALCIUM 40 MG: 40 TABLET, FILM COATED ORAL at 07:45

## 2025-07-31 RX ADMIN — PANTOPRAZOLE SODIUM 40 MG: 40 TABLET, DELAYED RELEASE ORAL at 05:17

## 2025-07-31 NOTE — TUMOR BOARD NOTE
[facility] HEAD AND NECK TUMOR BOARD NOTE:    Shola Carbajal Is a 80 y.o. male who was presented by Dr. Narayanan at [facility] Head & Neck Tumor Board on 8/1/25 which included representatives from all Head & Neck disciplines (Medical oncology/Radiation oncology/Otolaryngology/Radiology/Pathology).     Multi-disciplinary Team:  Head and Neck Surgeon/ENT: Oswaldo Thomas  Radiation Oncologist: vikram  Medical Oncologist: refer  SLP referral: n/a  Dental Clearance: n/a  Social Work: n/a    The [facility] Head and Neck Tumor Board considered available treatment options and made the following staging and recommendations:    Staging and Recommendations:    Site: left mandible SCC  Stage: vQ5C1P5  Recommendation: Observation  Given DOI 2.5 mm, 2 mm closest margin, 3 lymph nodes all negative, T1, no PNI or LVI  Recommendation is for close clinical observation  Clinical Trial Status:   N/A        -----------------------------------------------------------------------------------------------------------------------------------------------------------------------------------------------------------------------    History and Physical in Brief:  80 y.o. male who originally presented to Dr. Chacon with left oral cavity SCC diagnosed on biopsy by outside ENT. He had a history of left buccal SCC s/p resection with Dr. 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 Has 62 pack year smoking hx. We re-present him today to discuss final pathology.     Physical Exam: 7/1/25  General: awake, alert, well-nourished  Voice: strong, no breathiness or hoarseness  Face: symmetric, no lesions  Nose: no lesions of dorsum, no drainage  Oral cavity: mucous membranes moist, no lesions on lips or tongue, full tongue movement. There is a 1 cm erythematous area in  the left posterior mandible with bony exposure.  The oral tongue is fully mobile and midline on protrusion.  The patient wears upper dentures.   Oropharynx: symmetric palate elevation, no masses on inspection or palpation  Neck: soft, full range of motion, no palpable mass or LAD  Respiratory: no increased work of breathing or stridor    Imaging:  CT from 7/1/2025 which demonstrated no overt bony erosion and no LAD     Chest CT with Contrast (6/24/25):   IMPRESSION:  1. There is a 0.5 cm right upper lobe solid nodule which is stable  from exam dated 06/04/2025 and is indeterminate in the setting of  prior malignancy. Correlate with prior imaging, if available. If no  prior imaging is available, recommend continued imaging surveillance  as warranted.  2. Resolution of the right middle lobe opacities, consistent with  infectious/inflammatory etiology.  3. Mild centrilobular emphysema.  4. Severe coronary artery calcifications and suggestion of right  ventricular enlargement. Correlate with echocardiography.    Procedures to date:  triple endoscopy, PEG tube placement, mandibulectomy, left composite resection, skin graft, mandibular reconstruction, radial forearm free flap on 7/17     Pertinent Pathology:  FINAL DIAGNOSIS   A.  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  - Margins are negative for invasive carcinoma, closest: Lateral soft tissue (0.2 cm).     B.  Mandibular marrow, excision:  - Bone marrow, negative for carcinoma.     C.  Lymph nodes, left level IB, neck dissection:    - Three lymph nodes, negative for carcinoma (0/3).     Note: Tumor is seen to erode bone, however, no direct bone invasion is seen. Multiple deeper levels were examined.        : Dr. Navarro Bird (A2, A15, A16).   Electronically signed by Leoncio Saldaña DDS on  7/31/2025 at 1516 EDT        By the signature on this report, the individual or group listed as making the Final Interpretation/Diagnosis certifies that they have reviewed this case.    Case Summary Report   ORAL CAVITY   8th Edition - Protocol posted: 6/21/2023ORAL CAVITY - All Specimens  SPECIMEN   Procedure  Mandibulectomy: Marginal   TUMOR   Tumor Focality  Unifocal   Tumor Site  Oral cavity   Tumor Subsite  Lower gingiva   Tumor Laterality  Left   Tumor Size  Greatest Dimension (Centimeters): 1 cm   Histologic Type  Squamous cell carcinoma, conventional (keratinizing)   Histologic Grade  G2, moderately differentiated   Tumor Depth of Invasion (DOI)  2.5 mm   Lymphatic and / or Vascular Invasion  Not identified   Perineural Invasion  Not identified   MARGINS   Specimen Margin Status for Invasive Tumor  All specimen margins negative for invasive tumor   Distance from Invasive Tumor to Closest Specimen Margin  2 mm   Closest Specimen Margin(s) to Invasive Tumor  Lateral soft tissue   Specimen Margin Status for Noninvasive Tumor (High-grade Dysplasia)  All specimen margins negative for high-grade dysplasia / in situ disease   Tumor Bed Margin Status     Tumor Bed Margin Orientation  Unoriented to true margin surface   Tumor Bed Margin Status for Invasive Tumor  All tumor bed margins negative for invasive tumor   REGIONAL LYMPH NODES   Regional Lymph Node Status  All regional lymph nodes negative for tumor   Number of Lymph Nodes Examined  3   pTNM CLASSIFICATION (AJCC 8th Edition)   Reporting of pT, pN, and (when applicable) pM categories is based on information available to the pathologist at the time the report is issued. As per the AJCC (Chapter 1, 8th Ed.) it is the managing physician's responsibility to establish the final pathologic stage based upon all pertinent information, including but potentially not limited to this pathology report.        pT Category  pT1   pN Category  pN0             National  site-specific guidelines were discussed with respect to the case.    Tee Narayanan DO  Otolaryngology Resident (PGY-4)

## 2025-07-31 NOTE — DISCHARGE SUMMARY
Discharge Summary    Patient:  Luciano Crowell  YOB: 1945    MRN: [id number]   Acct: [id number]    Primary Care Physician: Chelsea Patel MD    Admit date:  7/29/2025    Discharge date:   07/31/25      Discharge Diagnoses:   Dehydration  Principal Problem:    Dehydration  Resolved Problems:    * No resolved hospital problems. *      Admitted for: (HPI)above    Hospital Course: patient was admitted with dehydration/ESTUARDO/hyperkalemia. Lasix/entresto were placed on hold. Was hydrated with IV NS and after completing acute stay will be DC home     Consultants:  PT    Discharge Medications:       Medication List        CONTINUE taking these medications      albuterol sulfate  (90 Base) MCG/ACT inhaler  Commonly known as: PROVENTIL;VENTOLIN;PROAIR     aspirin 81 MG EC tablet     atorvastatin 40 MG tablet  Commonly known as: LIPITOR     guaiFENesin 600 MG extended release tablet  Commonly known as: MUCINEX  Take 1 tablet by mouth 2 times daily as needed for Congestion (Cough)     metoprolol succinate 25 MG extended release tablet  Commonly known as: TOPROL XL     nitroGLYCERIN 0.4 MG SL tablet  Commonly known as: NITROSTAT     omeprazole 20 MG delayed release capsule  Commonly known as: PRILOSEC     ProAir Digihaler 108 (90 Base) MCG/ACT Aepb  Generic drug: Albuterol Sulfate (sensor)     risedronate 30 MG tablet  Commonly known as: ACTONEL     tiotropium-olodaterol 2.5-2.5 MCG/ACT Aers  Commonly known as: STIOLTO     vitamin D 50 MCG (2000 UT) Caps capsule  Commonly known as: CHOLECALCIFEROL            STOP taking these medications      furosemide 20 MG tablet  Commonly known as: LASIX     predniSONE 10 MG tablet  Commonly known as: DELTASONE     sacubitril-valsartan 24-26 MG per tablet  Commonly known as: ENTRESTO     sildenafil 100 MG tablet  Commonly known as: VIAGRA     spironolactone 25 MG tablet  Commonly known as: ALDACTONE              Physical Exam:    Vitals:  Vitals:    07/30/25 1536

## 2025-07-31 NOTE — DISCHARGE INSTR - COC
Good    Condition at Discharge: Stable    Rehab Potential (if transferring to Rehab): Good    Recommended Labs or Other Treatments After Discharge:     Physician Certification: I certify the above information and transfer of Luciano Crowell  is necessary for the continuing treatment of the diagnosis listed and that he requires Home Care for greater 30 days.     Update Admission H&P: No change in H&P    PHYSICIAN SIGNATURE:  Electronically signed by Elijah Mclaughlin MD on 7/31/25 at 1:57 PM EDT

## 2025-07-31 NOTE — HOME HEALTH
Patient inpatient 7/28-7/30 at [facility] for acute renal insufficiency. TIF entered

## 2025-07-31 NOTE — PROGRESS NOTES
Comprehensive Nutrition Assessment    Type and Reason for Visit:  Initial, Positive nutrition screen    Nutrition Recommendations/Plan:   Continue enteral nutrition support as per home routine  Monitor labs      Malnutrition Assessment:  Malnutrition Status:  Insufficient data (07/30/25 3454)    Context:  Chronic Illness     Findings of the 6 clinical characteristics of malnutrition:  Energy Intake:  Unable to assess  Weight Loss:  Unable to assess     Body Fat Loss:  Unable to assess     Muscle Mass Loss:  Unable to assess    Fluid Accumulation:  No fluid accumulation     Strength:  Not Performed    Nutrition Assessment:     Pt admitted due to dehydration, decreased kidney function as per labs. Rehydrating with IVF. Nutrition provided by family per home enteral nutrition support routine.     Nutrition Related Findings:    \"80 y.o. male who presented to [facility] with above complains. Patient with history oral cavity cancer s/p triple, peg tube placement, mandibulectomy, left composite resection, skin graft, mandible reconstruction, radial forearm free flap on 7/17/2025 at  was following with PCP with routine blood work, was called home regarding ESTUARDO/hyperkalemia and was guided to ER-after initial stabilization was admitted over night. Per his wife report he was getting sun-down-sm at home, more sleepy/confused during the last several days. Denied fever/dyspnea/CP, falls\" Weight 165#, reported uncertain as to weight loss amount. Eating poorly due to surgery. Diet is currently tube feed as provided by family per home routine (as reported by nursing). Pt has puree foods at home but currently not ordered PO diet. Meds reviewed; labs reviewed 7/30 elevated BUN/creatinine and low GFR. 7/29- sodium low, potassium high, now WNL. Skin- skin graft noted. Last BM 7/30 per EMR. Wound Type:  (skin graft)       Current Nutrition Intake & Therapies:    Average Meal Intake: NPO  Average Supplements Intake: None Ordered  No 
DC to home with all belongings with wife.    
Facility/Department: [facility] UNIT  Occupational Therapy Initial Assessment    Name: Luciano Crowell  : 1945  MRN: [id number]  Date of Service: 2025    OT order received. Per therapy manager, OT to d/c order d/t pt is not appropriate. Physical therapy evaluation completed earlier today with pt tolerating poorly and was verbally abusive to staff. No OT evaluation today.    Therapy Time   Individual Concurrent Group Co-treatment   Time In  12:30pm         Time Out  12:35pm         Minutes  5                 Latisha George, [id number]     
Order for DC to home.  Wife at bedside.  Update given.  Pt denies any pain.  \"Ready to go home.\"  DC instructions went over with wife and pt.  Voices understanding.  
Patient, wife and daughter brought form ER to room 224. Wife told this nurse that the patient gets very agitated, aggressive and impulsive at night and has often needed to be a 1:1.  Pt's wife was hoping to go home and get rest because he's kept her up for 2 nights but is willing to stay to help with her . She states she is hoping he can stay overnight, receive hydration and go home tomorrow. Pt was yelling and cussing upon arrival to the floor because he had to urinate. Food provided to wife. She states they were just at the surgeons office today and his diet was advanced to full liquid. Supervisor Called out to hospitalist's for medications for his agitation this evening. Brief wet and new supplies provided. IVF's from ER infusing without issue. Pt has 2 skin grafts: one to his left wrist that the wife states is a xeroform and gauze dressing and one to his left thigh that is open to air per their surgeons order. Pt's incision to his jaw line is intact with scant bloody drainage at times per wife and is open to air. Family in room with patient at this time, call light in reach.   
Tolerance  Activity Tolerance Comments: pt limited by agitation, pt swearing and negative , needing redirection to particapte and calm to complet eval    Plan  Physical Therapy Plan  General Plan: 5-7 times per week  Current Treatment Recommendations: Strengthening, Balance training, Functional mobility training, Transfer training, Endurance training, Stair training, Gait training  Safety Devices  Type of Devices: Bed alarm in place, Gait belt, Call light within reach, Left in bed, Telesitter in use    Restrictions    telesitter    Subjective  General  Chart Reviewed: Yes  Patient assessed for rehabilitation services?: Yes  Additional Pertinent Hx: dehydration, recent mandibulectomy  Family/Caregiver Present: Yes (son)  Referring Practitioner: Dr Mclaughlin  Referral Date : 07/29/25  Diagnosis: Dehydrtion  Follows Commands:  (telesitter,)  Subjective  Subjective: \"I hurt everywhere.\"         Social/Functional History  Social/Functional History  Lives With: Spouse  Type of Home: House  Home Layout: One level  Home Access: Stairs to enter with rails  Entrance Stairs - Number of Steps: 6  Entrance Stairs - Rails: Right  Bathroom Shower/Tub: Walk-in shower (built in bench)  Bathroom Toilet: Standard  Bathroom Equipment: Grab bars in shower, Grab bars around toilet  Home Equipment: Walker - Rolling  Has the patient had two or more falls in the past year or any fall with injury in the past year?:  (1 fall tripped over walker recently, and then fell 4-5 months ago)  Receives Help From: Family  Prior Level of Assist for ADLs: Independent  Prior Level of Assist for Homemaking: Independent  Prior Level of Assist for Transfers: Independent  Vision/Hearing       Cognition   Orientation  Overall Orientation Status: Within Functional Limits  Orientation Level: Oriented X4    Objective     Observation/Palpation  Observation: IV RUE, PEG tube, graft site L forearm and R thigh, telesitter          AROM RLE (degrees)  RLE AROM: WFL  RLE

## 2025-08-01 ENCOUNTER — DOCUMENTATION (OUTPATIENT)
Dept: HOME HEALTH SERVICES | Facility: HOME HEALTH | Age: 80
End: 2025-08-01

## 2025-08-01 ENCOUNTER — APPOINTMENT (OUTPATIENT)
Dept: HEMATOLOGY/ONCOLOGY | Facility: HOSPITAL | Age: 80
End: 2025-08-01
Payer: MEDICARE

## 2025-08-03 ENCOUNTER — HOME CARE VISIT (OUTPATIENT)
Dept: HOME HEALTH SERVICES | Facility: HOME HEALTH | Age: 80
End: 2025-08-03
Payer: MEDICARE

## 2025-08-03 VITALS — HEART RATE: 77 BPM | DIASTOLIC BLOOD PRESSURE: 59 MMHG | RESPIRATION RATE: 18 BRPM | SYSTOLIC BLOOD PRESSURE: 101 MMHG

## 2025-08-03 PROCEDURE — G0151 HHCP-SERV OF PT,EA 15 MIN: HCPCS | Mod: HHH

## 2025-08-03 SDOH — HEALTH STABILITY: PHYSICAL HEALTH: EXERCISE TYPE: SKILLED THERAPEUTIC EXERCISES

## 2025-08-03 ASSESSMENT — ENCOUNTER SYMPTOMS
DENIES PAIN: 1
PERSON REPORTING PAIN: PATIENT
AGGRESSION WITHIN DEFINED LIMITS: 1
HYPERTENSION: 1
ANGER WITHIN DEFINED LIMITS: 1
SLEEP QUALITY: FAIR

## 2025-08-03 ASSESSMENT — ACTIVITIES OF DAILY LIVING (ADL)
AMBULATION_DISTANCE/DURATION_TOLERATED: 80 FT
OASIS_M1830: 02
AMBULATION ASSISTANCE: 1
PHYSICAL TRANSFERS ASSESSED: 1
AMBULATION ASSISTANCE: MAXIMUM ASSIST
CURRENT_FUNCTION: MAXIMUM ASSIST
AMBULATION ASSISTANCE: STAND BY ASSIST
AMBULATION ASSISTANCE ON FLAT SURFACES: 1
ENTERING_EXITING_HOME: MODERATE ASSIST

## 2025-08-04 ENCOUNTER — HOME CARE VISIT (OUTPATIENT)
Dept: HOME HEALTH SERVICES | Facility: HOME HEALTH | Age: 80
End: 2025-08-04
Payer: MEDICARE

## 2025-08-05 ENCOUNTER — HOME CARE VISIT (OUTPATIENT)
Dept: HOME HEALTH SERVICES | Facility: HOME HEALTH | Age: 80
End: 2025-08-05
Payer: MEDICARE

## 2025-08-05 VITALS
OXYGEN SATURATION: 99 % | SYSTOLIC BLOOD PRESSURE: 102 MMHG | DIASTOLIC BLOOD PRESSURE: 68 MMHG | TEMPERATURE: 97.7 F | HEART RATE: 75 BPM | RESPIRATION RATE: 16 BRPM

## 2025-08-05 PROCEDURE — G0299 HHS/HOSPICE OF RN EA 15 MIN: HCPCS | Mod: HHH

## 2025-08-05 ASSESSMENT — PAIN SCALES - PAIN ASSESSMENT IN ADVANCED DEMENTIA (PAINAD)
BODYLANGUAGE: 0 - RELAXED.
NEGVOCALIZATION: 0 - NONE.
TOTALSCORE: 0
FACIALEXPRESSION: 0
BREATHING: 0
NEGVOCALIZATION: 0
CONSOLABILITY: 0 - NO NEED TO CONSOLE.
BODYLANGUAGE: 0
FACIALEXPRESSION: 0 - SMILING OR INEXPRESSIVE.
CONSOLABILITY: 0

## 2025-08-05 ASSESSMENT — ENCOUNTER SYMPTOMS
LOSS OF SENSATION IN FEET: 0
STOOL FREQUENCY: DAILY
DEPRESSION: 0
OCCASIONAL FEELINGS OF UNSTEADINESS: 0
APPETITE LEVEL: FAIR
DENIES PAIN: 1
CHANGE IN APPETITE: UNCHANGED
PERSON REPORTING PAIN: PATIENT
BOWEL PATTERN NORMAL: 1
LAST BOWEL MOVEMENT: 67422
HYPERTENSION: 1

## 2025-08-05 ASSESSMENT — ACTIVITIES OF DAILY LIVING (ADL)
CURRENT_FUNCTION: INDEPENDENT
PHYSICAL TRANSFERS ASSESSED: 1
AMBULATION ASSISTANCE: 1
AMBULATION ASSISTANCE: INDEPENDENT

## 2025-08-07 NOTE — PROGRESS NOTES
History of Present Illness    Shola Carbajal was seen in June 2025 at the request of Dr. Madrid for the management of an oral cavity cancer.  He had mild discomfort around the lower jaw for quite some time.  This was biopsied and consistent with squamous cell carcinoma.  He ended up getting surgery on July 17, 2025.  The margins were clear.  He was presented at our tumor board and it was not felt that he needed to get any further treatments.  He comes in today complaining of some significant discomfort in his mouth.    Physical Exam    Examination of the oral cavity shows the surgical site to be healed on the buccal area.  However around the mandible on the lingual aspect there is some protruding bone which is causing an ulceration on his tongue.    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  Margins are clear and no further treatments are recommended.  However he does have some bone exposure on the lingual aspect of the mandible that may need to be addressed surgically.  He will be seen by my reconstruction colleague in a few minutes.

## 2025-08-08 ENCOUNTER — HOME CARE VISIT (OUTPATIENT)
Dept: HOME HEALTH SERVICES | Facility: HOME HEALTH | Age: 80
End: 2025-08-08
Payer: MEDICARE

## 2025-08-08 ENCOUNTER — OFFICE VISIT (OUTPATIENT)
Dept: OTOLARYNGOLOGY | Facility: HOSPITAL | Age: 80
End: 2025-08-08
Payer: MEDICARE

## 2025-08-08 VITALS — WEIGHT: 174.3 LBS | BODY MASS INDEX: 25.01 KG/M2

## 2025-08-08 DIAGNOSIS — C06.9 CANCER OF ORAL CAVITY (MULTI): Primary | ICD-10-CM

## 2025-08-08 DIAGNOSIS — S01.409A: ICD-10-CM

## 2025-08-08 DIAGNOSIS — C06.9 CANCER OF ORAL CAVITY (MULTI): ICD-10-CM

## 2025-08-08 DIAGNOSIS — Z93.1 G TUBE FEEDINGS (MULTI): ICD-10-CM

## 2025-08-08 PROCEDURE — 1111F DSCHRG MED/CURRENT MED MERGE: CPT | Performed by: STUDENT IN AN ORGANIZED HEALTH CARE EDUCATION/TRAINING PROGRAM

## 2025-08-08 PROCEDURE — 1159F MED LIST DOCD IN RCRD: CPT | Performed by: STUDENT IN AN ORGANIZED HEALTH CARE EDUCATION/TRAINING PROGRAM

## 2025-08-08 PROCEDURE — 1111F DSCHRG MED/CURRENT MED MERGE: CPT | Performed by: OTOLARYNGOLOGY

## 2025-08-08 PROCEDURE — 1160F RVW MEDS BY RX/DR IN RCRD: CPT | Performed by: OTOLARYNGOLOGY

## 2025-08-08 PROCEDURE — 99211 OFF/OP EST MAY X REQ PHY/QHP: CPT | Mod: 24 | Performed by: STUDENT IN AN ORGANIZED HEALTH CARE EDUCATION/TRAINING PROGRAM

## 2025-08-08 PROCEDURE — G0157 HHC PT ASSISTANT EA 15: HCPCS | Mod: CQ,HHH

## 2025-08-08 PROCEDURE — 1159F MED LIST DOCD IN RCRD: CPT | Performed by: OTOLARYNGOLOGY

## 2025-08-08 PROCEDURE — 99211 OFF/OP EST MAY X REQ PHY/QHP: CPT | Performed by: OTOLARYNGOLOGY

## 2025-08-08 RX ORDER — AMOXICILLIN AND CLAVULANATE POTASSIUM 875; 125 MG/1; MG/1
1 TABLET, FILM COATED ORAL 2 TIMES DAILY
Qty: 14 TABLET | Refills: 0 | Status: SHIPPED | OUTPATIENT
Start: 2025-08-08 | End: 2025-08-15

## 2025-08-08 RX ORDER — OXYCODONE HCL 5 MG/5 ML
5 SOLUTION, ORAL ORAL EVERY 6 HOURS PRN
Qty: 100 ML | Refills: 0 | Status: SHIPPED | OUTPATIENT
Start: 2025-08-08 | End: 2025-08-15

## 2025-08-08 ASSESSMENT — PAIN DESCRIPTION - PAIN TYPE: TYPE: ACUTE PAIN;SURGICAL PAIN

## 2025-08-08 ASSESSMENT — PATIENT HEALTH QUESTIONNAIRE - PHQ9
2. FEELING DOWN, DEPRESSED OR HOPELESS: SEVERAL DAYS
1. LITTLE INTEREST OR PLEASURE IN DOING THINGS: NOT AT ALL
SUM OF ALL RESPONSES TO PHQ9 QUESTIONS 1 AND 2: 1

## 2025-08-08 NOTE — PROGRESS NOTES
HEAD AND NECK SURGERY POST OP  [facility]    HPI  I had the pleasure of seeing Shola Carbajal for a post-operative visit today. Patient is s/p triple endoscopy, PEG tube placement, mandibulectomy, left composite resection, skin graft, mandibular reconstruction, radial forearm free flap on 7/17 with Dr. Chacon and William. He is accompanied by his wife who assists with history and treatment planning.  We saw him last on 7/29 at which time he looked malnourished, and we recommended he present to the emergency room for lab work and rehydration.  He was admitted closer to home on 7/29 through 7/31 for treatment of his KULWANT.  Since then, he has advanced his oral diet, and now is on a soft diet, very well-tolerated.  He is interested in advancing to start chewing.  They are continuing to use tube feeds, and patient is continuing to gain weight, up approximately 15 pounds since before surgery.    Physical Examination  Vitals:  Wt 79.1 kg (174 lb 4.8 oz)   BMI 25.01 kg/m²   General: He appears tired, in wheelchair   Oral Cavity:  The patient is able to open the mouth widely without trismus. left alveolar ridge/buccal flap healthy, however there is some exposure of the lingual surface of the mandible with some dehiscence of the inferior suture line; the exposed bone is causing some local tissue trauma to the left posterolateral tongue  Oropharynx: There are no mucosal abnormalities noted within the oropharynx.       Salivary glands: There are no palpable masses of the parotid or submandibular glands.   Neuro: Cranial nerves 2-12 are without obvious abnormality.  Neck: left neck incision intact, healing well, significant improvement in left neck edema, no evidence of soft tissue neck infection, not concerned for fistula  Extremities: left forearm with extensive superficial crusting/scabbing; skin graft site healing well, with good wound care    Pathology: T1N0 oral cavity SCCa; discussed at [facility] 8/1, rec[facility]     ASSESSMENT and PLAN:    Left oral cavity SCC, now s/p OCCR, neck dissection, left RFFF 7/17/25 (Oswaldo/William)  G tube dependent  Dysphagia  Exposed mandible, wound dehiscence   Post-operative pain  - patient has advanced to soft diet, but with new exposure of bone and dehiscence of her inferior suture line  - recommend CLD and maintaining G-tube feeds to optimize wound healing probability  - Reviewed wound care recommendations for forearm - xeroform to left forearm skin graft and proximal incision with kerlix wrap  - Recommend augmentin and peridex   - Will plan to add on for OR next week for mandibular debridement  - will order oxycodone     Patient seen and discussed with attending Dr. Thomas.  Bony Garcia MD [facility]  ENT PGY4

## 2025-08-11 ENCOUNTER — HOME CARE VISIT (OUTPATIENT)
Dept: HOME HEALTH SERVICES | Facility: HOME HEALTH | Age: 80
End: 2025-08-11
Payer: MEDICARE

## 2025-08-12 ENCOUNTER — HOME CARE VISIT (OUTPATIENT)
Dept: HOME HEALTH SERVICES | Facility: HOME HEALTH | Age: 80
End: 2025-08-12
Payer: MEDICARE

## 2025-08-12 VITALS
RESPIRATION RATE: 16 BRPM | SYSTOLIC BLOOD PRESSURE: 102 MMHG | HEART RATE: 76 BPM | OXYGEN SATURATION: 97 % | TEMPERATURE: 95.9 F | DIASTOLIC BLOOD PRESSURE: 60 MMHG

## 2025-08-12 PROCEDURE — G0299 HHS/HOSPICE OF RN EA 15 MIN: HCPCS | Mod: HHH

## 2025-08-12 SDOH — ECONOMIC STABILITY: GENERAL

## 2025-08-12 ASSESSMENT — PAIN SCALES - PAIN ASSESSMENT IN ADVANCED DEMENTIA (PAINAD)
TOTALSCORE: 0
CONSOLABILITY: 0
FACIALEXPRESSION: 0 - SMILING OR INEXPRESSIVE.
FACIALEXPRESSION: 0
BODYLANGUAGE: 0 - RELAXED.
CONSOLABILITY: 0 - NO NEED TO CONSOLE.
NEGVOCALIZATION: 0
BODYLANGUAGE: 0
BREATHING: 0
NEGVOCALIZATION: 0 - NONE.

## 2025-08-12 ASSESSMENT — ENCOUNTER SYMPTOMS
MUSCLE WEAKNESS: 1
APPETITE LEVEL: GOOD
OCCASIONAL FEELINGS OF UNSTEADINESS: 0
LOSS OF SENSATION IN FEET: 0
HYPERTENSION: 1
LAST BOWEL MOVEMENT: 67429
STOOL FREQUENCY: DAILY
DEPRESSION: 0
BOWEL PATTERN NORMAL: 1
CHANGE IN APPETITE: UNCHANGED
DENIES PAIN: 1
PERSON REPORTING PAIN: PATIENT

## 2025-08-12 ASSESSMENT — ACTIVITIES OF DAILY LIVING (ADL)
PHYSICAL TRANSFERS ASSESSED: 1
CURRENT_FUNCTION: INDEPENDENT
MONEY MANAGEMENT (EXPENSES/BILLS): INDEPENDENT
AMBULATION ASSISTANCE: 1
AMBULATION ASSISTANCE: INDEPENDENT

## 2025-08-13 ENCOUNTER — ANESTHESIA (OUTPATIENT)
Dept: OPERATING ROOM | Facility: HOSPITAL | Age: 80
End: 2025-08-13
Payer: MEDICARE

## 2025-08-13 ENCOUNTER — HOSPITAL ENCOUNTER (OUTPATIENT)
Facility: HOSPITAL | Age: 80
Setting detail: OUTPATIENT SURGERY
Discharge: HOME | End: 2025-08-13
Attending: STUDENT IN AN ORGANIZED HEALTH CARE EDUCATION/TRAINING PROGRAM | Admitting: STUDENT IN AN ORGANIZED HEALTH CARE EDUCATION/TRAINING PROGRAM
Payer: MEDICARE

## 2025-08-13 ENCOUNTER — APPOINTMENT (OUTPATIENT)
Dept: CARDIOLOGY | Facility: HOSPITAL | Age: 80
End: 2025-08-13
Payer: MEDICARE

## 2025-08-13 ENCOUNTER — HOME CARE VISIT (OUTPATIENT)
Dept: HOME HEALTH SERVICES | Facility: HOME HEALTH | Age: 80
End: 2025-08-13
Payer: MEDICARE

## 2025-08-13 ENCOUNTER — ANESTHESIA EVENT (OUTPATIENT)
Dept: OPERATING ROOM | Facility: HOSPITAL | Age: 80
End: 2025-08-13
Payer: MEDICARE

## 2025-08-13 VITALS
WEIGHT: 168.65 LBS | BODY MASS INDEX: 24.14 KG/M2 | OXYGEN SATURATION: 97 % | SYSTOLIC BLOOD PRESSURE: 104 MMHG | HEIGHT: 70 IN | TEMPERATURE: 97.5 F | RESPIRATION RATE: 20 BRPM | DIASTOLIC BLOOD PRESSURE: 58 MMHG | HEART RATE: 65 BPM

## 2025-08-13 DIAGNOSIS — I50.9 CHRONIC CONGESTIVE HEART FAILURE, UNSPECIFIED HEART FAILURE TYPE: Primary | ICD-10-CM

## 2025-08-13 DIAGNOSIS — S01.409A: ICD-10-CM

## 2025-08-13 PROCEDURE — 2500000005 HC RX 250 GENERAL PHARMACY W/O HCPCS: Performed by: STUDENT IN AN ORGANIZED HEALTH CARE EDUCATION/TRAINING PROGRAM

## 2025-08-13 PROCEDURE — 3600000008 HC OR TIME - EACH INCREMENTAL 1 MINUTE - PROCEDURE LEVEL THREE: Performed by: STUDENT IN AN ORGANIZED HEALTH CARE EDUCATION/TRAINING PROGRAM

## 2025-08-13 PROCEDURE — 93287 PERI-PX DEVICE EVAL & PRGR: CPT

## 2025-08-13 PROCEDURE — 97597 DBRDMT OPN WND 1ST 20 CM/<: CPT | Performed by: STUDENT IN AN ORGANIZED HEALTH CARE EDUCATION/TRAINING PROGRAM

## 2025-08-13 PROCEDURE — 87077 CULTURE AEROBIC IDENTIFY: CPT | Performed by: STUDENT IN AN ORGANIZED HEALTH CARE EDUCATION/TRAINING PROGRAM

## 2025-08-13 PROCEDURE — 3600000009 HC OR TIME - EACH INCREMENTAL 1 MINUTE - PROCEDURE LEVEL FOUR: Performed by: STUDENT IN AN ORGANIZED HEALTH CARE EDUCATION/TRAINING PROGRAM

## 2025-08-13 PROCEDURE — 36415 COLL VENOUS BLD VENIPUNCTURE: CPT | Performed by: STUDENT IN AN ORGANIZED HEALTH CARE EDUCATION/TRAINING PROGRAM

## 2025-08-13 PROCEDURE — C1729 CATH, DRAINAGE: HCPCS | Performed by: STUDENT IN AN ORGANIZED HEALTH CARE EDUCATION/TRAINING PROGRAM

## 2025-08-13 PROCEDURE — 3600000004 HC OR TIME - INITIAL BASE CHARGE - PROCEDURE LEVEL FOUR: Performed by: STUDENT IN AN ORGANIZED HEALTH CARE EDUCATION/TRAINING PROGRAM

## 2025-08-13 PROCEDURE — 3700000001 HC GENERAL ANESTHESIA TIME - INITIAL BASE CHARGE: Performed by: STUDENT IN AN ORGANIZED HEALTH CARE EDUCATION/TRAINING PROGRAM

## 2025-08-13 PROCEDURE — 2720000007 HC OR 272 NO HCPCS: Performed by: STUDENT IN AN ORGANIZED HEALTH CARE EDUCATION/TRAINING PROGRAM

## 2025-08-13 PROCEDURE — 2500000004 HC RX 250 GENERAL PHARMACY W/ HCPCS (ALT 636 FOR OP/ED): Performed by: NURSE ANESTHETIST, CERTIFIED REGISTERED

## 2025-08-13 PROCEDURE — 3700000002 HC GENERAL ANESTHESIA TIME - EACH INCREMENTAL 1 MINUTE: Performed by: STUDENT IN AN ORGANIZED HEALTH CARE EDUCATION/TRAINING PROGRAM

## 2025-08-13 PROCEDURE — 7100000009 HC PHASE TWO TIME - INITIAL BASE CHARGE: Performed by: STUDENT IN AN ORGANIZED HEALTH CARE EDUCATION/TRAINING PROGRAM

## 2025-08-13 PROCEDURE — 7100000002 HC RECOVERY ROOM TIME - EACH INCREMENTAL 1 MINUTE: Performed by: STUDENT IN AN ORGANIZED HEALTH CARE EDUCATION/TRAINING PROGRAM

## 2025-08-13 PROCEDURE — 7100000001 HC RECOVERY ROOM TIME - INITIAL BASE CHARGE: Performed by: STUDENT IN AN ORGANIZED HEALTH CARE EDUCATION/TRAINING PROGRAM

## 2025-08-13 PROCEDURE — 3600000003 HC OR TIME - INITIAL BASE CHARGE - PROCEDURE LEVEL THREE: Performed by: STUDENT IN AN ORGANIZED HEALTH CARE EDUCATION/TRAINING PROGRAM

## 2025-08-13 PROCEDURE — 7100000010 HC PHASE TWO TIME - EACH INCREMENTAL 1 MINUTE: Performed by: STUDENT IN AN ORGANIZED HEALTH CARE EDUCATION/TRAINING PROGRAM

## 2025-08-13 PROCEDURE — 2500000004 HC RX 250 GENERAL PHARMACY W/ HCPCS (ALT 636 FOR OP/ED)

## 2025-08-13 PROCEDURE — 21044 REMOVAL OF JAW BONE LESION: CPT | Performed by: STUDENT IN AN ORGANIZED HEALTH CARE EDUCATION/TRAINING PROGRAM

## 2025-08-13 RX ORDER — OXYCODONE HYDROCHLORIDE 5 MG/1
5 TABLET ORAL EVERY 4 HOURS PRN
Status: DISCONTINUED | OUTPATIENT
Start: 2025-08-13 | End: 2025-08-13 | Stop reason: HOSPADM

## 2025-08-13 RX ORDER — CHLORHEXIDINE GLUCONATE ORAL RINSE 1.2 MG/ML
15 SOLUTION DENTAL AS NEEDED
Qty: 120 ML | Refills: 0 | Status: SHIPPED | OUTPATIENT
Start: 2025-08-13 | End: 2025-08-31

## 2025-08-13 RX ORDER — PROPOFOL 10 MG/ML
INJECTION, EMULSION INTRAVENOUS AS NEEDED
Status: DISCONTINUED | OUTPATIENT
Start: 2025-08-13 | End: 2025-08-13

## 2025-08-13 RX ORDER — LIDOCAINE HCL/PF 100 MG/5ML
SYRINGE (ML) INTRAVENOUS AS NEEDED
Status: DISCONTINUED | OUTPATIENT
Start: 2025-08-13 | End: 2025-08-13

## 2025-08-13 RX ORDER — ONDANSETRON HYDROCHLORIDE 2 MG/ML
4 INJECTION, SOLUTION INTRAVENOUS ONCE AS NEEDED
Status: DISCONTINUED | OUTPATIENT
Start: 2025-08-13 | End: 2025-08-13 | Stop reason: HOSPADM

## 2025-08-13 RX ORDER — HYDROMORPHONE HYDROCHLORIDE 0.2 MG/ML
0.2 INJECTION INTRAMUSCULAR; INTRAVENOUS; SUBCUTANEOUS EVERY 5 MIN PRN
Status: DISCONTINUED | OUTPATIENT
Start: 2025-08-13 | End: 2025-08-13 | Stop reason: HOSPADM

## 2025-08-13 RX ORDER — AMPICILLIN AND SULBACTAM 2; 1 G/1; G/1
INJECTION, POWDER, FOR SOLUTION INTRAMUSCULAR; INTRAVENOUS AS NEEDED
Status: DISCONTINUED | OUTPATIENT
Start: 2025-08-13 | End: 2025-08-13

## 2025-08-13 RX ORDER — SODIUM CHLORIDE, SODIUM LACTATE, POTASSIUM CHLORIDE, CALCIUM CHLORIDE 600; 310; 30; 20 MG/100ML; MG/100ML; MG/100ML; MG/100ML
50 INJECTION, SOLUTION INTRAVENOUS CONTINUOUS
Status: DISCONTINUED | OUTPATIENT
Start: 2025-08-13 | End: 2025-08-13 | Stop reason: HOSPADM

## 2025-08-13 RX ORDER — ROCURONIUM BROMIDE 10 MG/ML
INJECTION, SOLUTION INTRAVENOUS AS NEEDED
Status: DISCONTINUED | OUTPATIENT
Start: 2025-08-13 | End: 2025-08-13

## 2025-08-13 RX ORDER — LIDOCAINE HYDROCHLORIDE 10 MG/ML
0.1 INJECTION, SOLUTION INFILTRATION; PERINEURAL ONCE
Status: DISCONTINUED | OUTPATIENT
Start: 2025-08-13 | End: 2025-08-13 | Stop reason: HOSPADM

## 2025-08-13 RX ORDER — POLYETHYLENE GLYCOL 3350 17 G/17G
17 POWDER, FOR SOLUTION ORAL DAILY
Qty: 7 PACKET | Refills: 0 | Status: SHIPPED | OUTPATIENT
Start: 2025-08-13 | End: 2025-08-22

## 2025-08-13 RX ORDER — FENTANYL CITRATE 50 UG/ML
INJECTION, SOLUTION INTRAMUSCULAR; INTRAVENOUS AS NEEDED
Status: DISCONTINUED | OUTPATIENT
Start: 2025-08-13 | End: 2025-08-13

## 2025-08-13 RX ORDER — PHENYLEPHRINE HCL IN 0.9% NACL 0.4MG/10ML
SYRINGE (ML) INTRAVENOUS AS NEEDED
Status: DISCONTINUED | OUTPATIENT
Start: 2025-08-13 | End: 2025-08-13

## 2025-08-13 RX ORDER — ACETAMINOPHEN 325 MG/1
650 TABLET ORAL EVERY 4 HOURS PRN
Status: DISCONTINUED | OUTPATIENT
Start: 2025-08-13 | End: 2025-08-13 | Stop reason: HOSPADM

## 2025-08-13 RX ORDER — SODIUM CHLORIDE, SODIUM LACTATE, POTASSIUM CHLORIDE, CALCIUM CHLORIDE 600; 310; 30; 20 MG/100ML; MG/100ML; MG/100ML; MG/100ML
INJECTION, SOLUTION INTRAVENOUS CONTINUOUS PRN
Status: DISCONTINUED | OUTPATIENT
Start: 2025-08-13 | End: 2025-08-13

## 2025-08-13 RX ORDER — AMOXICILLIN AND CLAVULANATE POTASSIUM 875; 125 MG/1; MG/1
1 TABLET, FILM COATED ORAL 2 TIMES DAILY
Qty: 14 TABLET | Refills: 0 | Status: SHIPPED | OUTPATIENT
Start: 2025-08-13 | End: 2025-08-15 | Stop reason: ALTCHOICE

## 2025-08-13 RX ORDER — SODIUM CHLORIDE 0.9 G/100ML
INJECTION, SOLUTION IRRIGATION AS NEEDED
Status: DISCONTINUED | OUTPATIENT
Start: 2025-08-13 | End: 2025-08-13 | Stop reason: HOSPADM

## 2025-08-13 RX ORDER — ONDANSETRON HYDROCHLORIDE 2 MG/ML
INJECTION, SOLUTION INTRAVENOUS AS NEEDED
Status: DISCONTINUED | OUTPATIENT
Start: 2025-08-13 | End: 2025-08-13

## 2025-08-13 RX ADMIN — Medication 40 MCG: at 15:00

## 2025-08-13 RX ADMIN — SUGAMMADEX 200 MG: 100 INJECTION, SOLUTION INTRAVENOUS at 15:35

## 2025-08-13 RX ADMIN — ROCURONIUM BROMIDE 50 MG: 10 INJECTION INTRAVENOUS at 14:45

## 2025-08-13 RX ADMIN — SODIUM CHLORIDE, POTASSIUM CHLORIDE, SODIUM LACTATE AND CALCIUM CHLORIDE: 600; 310; 30; 20 INJECTION, SOLUTION INTRAVENOUS at 14:34

## 2025-08-13 RX ADMIN — FENTANYL CITRATE 50 MCG: 50 INJECTION, SOLUTION INTRAMUSCULAR; INTRAVENOUS at 14:43

## 2025-08-13 RX ADMIN — ONDANSETRON 4 MG: 2 INJECTION INTRAMUSCULAR; INTRAVENOUS at 15:23

## 2025-08-13 RX ADMIN — PROPOFOL 100 MG: 10 INJECTION, EMULSION INTRAVENOUS at 14:44

## 2025-08-13 RX ADMIN — LIDOCAINE HYDROCHLORIDE 60 MG: 20 INJECTION INTRAVENOUS at 14:43

## 2025-08-13 RX ADMIN — AMPICILLIN AND SULBACTAM 3 G: 2; 1 INJECTION, POWDER, FOR SOLUTION INTRAVENOUS at 14:49

## 2025-08-13 ASSESSMENT — PAIN SCALES - GENERAL
PAINLEVEL_OUTOF10: 0 - NO PAIN

## 2025-08-13 ASSESSMENT — PAIN - FUNCTIONAL ASSESSMENT: PAIN_FUNCTIONAL_ASSESSMENT: 0-10

## 2025-08-15 ENCOUNTER — TELEPHONE (OUTPATIENT)
Dept: OTOLARYNGOLOGY | Facility: HOSPITAL | Age: 80
End: 2025-08-15
Payer: MEDICARE

## 2025-08-15 DIAGNOSIS — Z93.1 G TUBE FEEDINGS (MULTI): ICD-10-CM

## 2025-08-15 DIAGNOSIS — S01.409A: ICD-10-CM

## 2025-08-15 DIAGNOSIS — C06.9 CANCER OF ORAL CAVITY (MULTI): ICD-10-CM

## 2025-08-15 LAB
FUNGUS SPEC CULT: ABNORMAL
FUNGUS SPEC FUNGUS STN: ABNORMAL

## 2025-08-15 RX ORDER — SULFAMETHOXAZOLE AND TRIMETHOPRIM 800; 160 MG/1; MG/1
1 TABLET ORAL 2 TIMES DAILY
Qty: 20 TABLET | Refills: 0 | Status: SHIPPED | OUTPATIENT
Start: 2025-08-15 | End: 2025-08-25

## 2025-08-16 LAB
B-LACTAMASE ORGANISM ISLT: NEGATIVE
BACTERIA SPEC CULT: ABNORMAL
GRAM STN SPEC: ABNORMAL

## 2025-08-18 ENCOUNTER — HOME CARE VISIT (OUTPATIENT)
Dept: HOME HEALTH SERVICES | Facility: HOME HEALTH | Age: 80
End: 2025-08-18
Payer: MEDICARE

## 2025-08-18 VITALS
OXYGEN SATURATION: 96 % | DIASTOLIC BLOOD PRESSURE: 66 MMHG | RESPIRATION RATE: 14 BRPM | TEMPERATURE: 98 F | SYSTOLIC BLOOD PRESSURE: 98 MMHG | HEART RATE: 71 BPM

## 2025-08-18 PROCEDURE — G0151 HHCP-SERV OF PT,EA 15 MIN: HCPCS | Mod: HHH

## 2025-08-18 SDOH — HEALTH STABILITY: PHYSICAL HEALTH: PHYSICAL EXERCISE: 15

## 2025-08-18 SDOH — HEALTH STABILITY: PHYSICAL HEALTH: EXERCISE ACTIVITY: MARCHING

## 2025-08-18 SDOH — HEALTH STABILITY: PHYSICAL HEALTH: EXERCISE ACTIVITIES SETS: 1

## 2025-08-18 SDOH — HEALTH STABILITY: PHYSICAL HEALTH: EXERCISE ACTIVITY: HIP ABD/ADD

## 2025-08-18 SDOH — HEALTH STABILITY: PHYSICAL HEALTH: PHYSICAL EXERCISE: SEATED

## 2025-08-18 SDOH — HEALTH STABILITY: PHYSICAL HEALTH: EXERCISE ACTIVITY: LAQ

## 2025-08-18 SDOH — HEALTH STABILITY: PHYSICAL HEALTH: EXERCISE ACTIVITY: HS CURLS

## 2025-08-18 SDOH — HEALTH STABILITY: PHYSICAL HEALTH: EXERCISE ACTIVITY: ANKLE DF/PF

## 2025-08-18 SDOH — HEALTH STABILITY: PHYSICAL HEALTH: EXERCISE TYPE: LE EXERCISES

## 2025-08-18 ASSESSMENT — BALANCE ASSESSMENTS
NUDGED SCORE: 2
TURNING 360 DEGREES STEPS: 1 - CONTINUOUS STEPS
ATTEMPTS TO ARISE: 2 - ABLE TO RISE, ONE ATTEMPT
NUDGED: 2 - STEADY
BALANCE SCORE: 13
SITTING BALANCE: 1 - STEADY, SAFE
STANDING BALANCE: 1 - STEADY BUT WIDE STANCE AND USES CANE OR OTHER SUPPORT
IMMEDIATE STANDING BALANCE FIRST 5 SECONDS: 2 - STEADY WITHOUT WALKER OR OTHER SUPPORT
EYES CLOSED AT MAXIMUM POSITION NUDGED: 1 - STEADY
ARISES: 1 - ABLE, USES ARMS TO HELP
SITTING DOWN: 1 - USES ARMS OR NOT SMOOTH MOTION
ARISING SCORE: 1

## 2025-08-18 ASSESSMENT — GAIT ASSESSMENTS
BALANCE AND GAIT SCORE: 25
INITIATION OF GAIT IMMEDIATELY AFTER GO: 1 - NO HESITANCY
PATH: 2 - STRAIGHT WITHOUT WALKING AID
STEP SYMMETRY: 1 - RIGHT AND LEFT STEP LENGTH APPEAR EQUAL
PATH SCORE: 2
STEP CONTINUITY: 1 - STEPS APPEAR CONTINUOUS
TRUNK SCORE: 2
TRUNK: 2 - NO SWAY, NO FLEXION, NO USE OF ARMS, NO WALKING AID
GAIT SCORE: 12
WALKING STANCE: 1 - HEELS ALMOST TOUCHING WHILE WALKING

## 2025-08-18 ASSESSMENT — ENCOUNTER SYMPTOMS
PERSON REPORTING PAIN: PATIENT
DENIES PAIN: 1
OCCASIONAL FEELINGS OF UNSTEADINESS: 1

## 2025-08-18 ASSESSMENT — ACTIVITIES OF DAILY LIVING (ADL)
AMBULATION ASSISTANCE ON FLAT SURFACES: 1
AMBULATION_DISTANCE/DURATION_TOLERATED: 150 FT

## 2025-08-22 ENCOUNTER — HOME CARE VISIT (OUTPATIENT)
Dept: HOME HEALTH SERVICES | Facility: HOME HEALTH | Age: 80
End: 2025-08-22
Payer: MEDICARE

## 2025-08-26 ENCOUNTER — HOME CARE VISIT (OUTPATIENT)
Dept: HOME HEALTH SERVICES | Facility: HOME HEALTH | Age: 80
End: 2025-08-26
Payer: MEDICARE

## 2025-08-29 ENCOUNTER — OFFICE VISIT (OUTPATIENT)
Dept: OTOLARYNGOLOGY | Facility: HOSPITAL | Age: 80
End: 2025-08-29
Payer: MEDICARE

## 2025-08-29 VITALS — HEIGHT: 70 IN | WEIGHT: 169.5 LBS | BODY MASS INDEX: 24.26 KG/M2

## 2025-08-29 DIAGNOSIS — C06.9 CANCER OF ORAL CAVITY (MULTI): ICD-10-CM

## 2025-08-29 DIAGNOSIS — Z93.1 G TUBE FEEDINGS (MULTI): ICD-10-CM

## 2025-08-29 DIAGNOSIS — Z91.89 AT RISK FOR MALNUTRITION: ICD-10-CM

## 2025-08-29 DIAGNOSIS — S01.409A: Primary | ICD-10-CM

## 2025-08-29 PROCEDURE — 1159F MED LIST DOCD IN RCRD: CPT | Performed by: STUDENT IN AN ORGANIZED HEALTH CARE EDUCATION/TRAINING PROGRAM

## 2025-08-29 PROCEDURE — 99211 OFF/OP EST MAY X REQ PHY/QHP: CPT | Mod: 24 | Performed by: STUDENT IN AN ORGANIZED HEALTH CARE EDUCATION/TRAINING PROGRAM

## 2025-08-29 RX ORDER — PENTOXIFYLLINE 400 MG/1
400 TABLET, EXTENDED RELEASE ORAL 2 TIMES DAILY
Qty: 60 TABLET | Refills: 0 | Status: SHIPPED | OUTPATIENT
Start: 2025-08-29 | End: 2025-09-28

## 2025-08-29 RX ORDER — VITAMIN E MIXED 400 UNIT
450 CAPSULE ORAL DAILY
Qty: 30 CAPSULE | Refills: 0 | Status: SHIPPED | OUTPATIENT
Start: 2025-08-29 | End: 2025-09-28

## 2025-09-03 ENCOUNTER — HOME CARE VISIT (OUTPATIENT)
Dept: HOME HEALTH SERVICES | Facility: HOME HEALTH | Age: 80
End: 2025-09-03
Payer: MEDICARE

## 2025-09-03 VITALS
HEART RATE: 67 BPM | OXYGEN SATURATION: 96 % | DIASTOLIC BLOOD PRESSURE: 62 MMHG | SYSTOLIC BLOOD PRESSURE: 104 MMHG | RESPIRATION RATE: 18 BRPM | TEMPERATURE: 95.2 F

## 2025-09-03 PROCEDURE — G0299 HHS/HOSPICE OF RN EA 15 MIN: HCPCS | Mod: HHH

## 2025-09-03 ASSESSMENT — ENCOUNTER SYMPTOMS
PAIN LOCATION: ABDOMEN
PERSON REPORTING PAIN: PATIENT
LOWEST PAIN SEVERITY IN PAST 24 HOURS: 0/10
PAIN LOCATION - PAIN DURATION: INTERMITTENT
SUBJECTIVE PAIN PROGRESSION: UNCHANGED
PAIN LOCATION - PAIN SEVERITY: 1/10
PAIN SEVERITY GOAL: 0/10
HIGHEST PAIN SEVERITY IN PAST 24 HOURS: 1/10
PAIN: 1
PAIN LOCATION - PAIN FREQUENCY: INTERMITTENT
PAIN LOCATION - PAIN QUALITY: ACHE

## 2025-09-03 ASSESSMENT — ACTIVITIES OF DAILY LIVING (ADL)
PHYSICAL TRANSFERS ASSESSED: 1
AMBULATION ASSISTANCE: 1
CURRENT_FUNCTION: INDEPENDENT
HOME_HEALTH_OASIS: 00
OASIS_M1830: 00
AMBULATION ASSISTANCE: INDEPENDENT

## 2025-09-03 ASSESSMENT — PAIN SCALES - PAIN ASSESSMENT IN ADVANCED DEMENTIA (PAINAD)
CONSOLABILITY: 0
NEGVOCALIZATION: 0 - NONE.
BREATHING: 0
TOTALSCORE: 0
FACIALEXPRESSION: 0 - SMILING OR INEXPRESSIVE.
BODYLANGUAGE: 0
NEGVOCALIZATION: 0
FACIALEXPRESSION: 0
CONSOLABILITY: 0 - NO NEED TO CONSOLE.
BODYLANGUAGE: 0 - RELAXED.

## 2025-09-12 ENCOUNTER — APPOINTMENT (OUTPATIENT)
Dept: OTOLARYNGOLOGY | Facility: HOSPITAL | Age: 80
End: 2025-09-12
Payer: MEDICARE

## (undated) DEVICE — CATHETER TRAY, SURESTEP, 16FR, URINE METER W/STATLOCK

## (undated) DEVICE — SUTURE, SILK, 2-0, 30 IN, SH, BLACK

## (undated) DEVICE — TUBING, SUCTION, CONNECTING, STERILE 0.25 X 120 IN., LF

## (undated) DEVICE — BACKGROUND MATERIAL, MERCIAN, YELLOW, 1MM GRID, LF

## (undated) DEVICE — CONTAINER, SPECIMEN, 120 ML, STERILE

## (undated) DEVICE — HOLSTER, JET SAFETY

## (undated) DEVICE — STAY SET, SURGICAL, 5MM SHARP HOOK, 8PK

## (undated) DEVICE — BAG, DRAINAGE, URINARY, W/ANTI-REFLUX CHAMBER, INFECTION CON

## (undated) DEVICE — PAD, GROUNDING, ELECTROSURGICAL, W/9 FT CABLE, POLYHESIVE II, ADULT, LF

## (undated) DEVICE — NEEDLE, HYPODERMIC, 25 G X 1.5 IN, A BEVEL, STERILE

## (undated) DEVICE — BLADE, RECIPROCATING TAPERED

## (undated) DEVICE — VALVE, DEFENDO, 5 PCS BUTTON SET, SINGLE USE

## (undated) DEVICE — SPONGE, DISSECTOR, PEANUT, 3/8, STERILE 5 FOAM HOLDER"

## (undated) DEVICE — SUTURE, MONOCRYL, 4-0, 18 IN, PS2, UNDYED

## (undated) DEVICE — VALVE, SUCTION

## (undated) DEVICE — SPEAR, EYE, SURGICAL, WECK-CEL, CELLULOSE

## (undated) DEVICE — NEEDLE, ELECTRODE, ELECTROSURGICAL, INSULATED

## (undated) DEVICE — CUP, SOLUTION

## (undated) DEVICE — ADAPTER, WATER BOTTLE, SMART CAP, 140/160/180 SERIES

## (undated) DEVICE — Device

## (undated) DEVICE — BASIN, EMESIS, STANDARD, STERILE

## (undated) DEVICE — SYRINGE, 5 CC, LUER LOCK

## (undated) DEVICE — RETRACTOR, SPANDEX CHEEK & LIP HAGER-WERKEN P605-454

## (undated) DEVICE — PADDING, WEBRIL, UNDERCAST, STERILE, 4 IN

## (undated) DEVICE — COVER, CART, 45 X 27 X 48 IN, CLEAR

## (undated) DEVICE — SPONGE, LAP, XRAY DECT, SC+RFID, 12X12, STERILE

## (undated) DEVICE — BLADE, DERMATOME, 1.25 X 4.25 IN, STERILE

## (undated) DEVICE — DRESSING, MEPILEX, BORDER, HEEL, 8.7 X 9.1 IN

## (undated) DEVICE — CATHETER, IV, INSYTE, AUTOGUARD, SHIELDED, 16 G X 1.16 IN, VIALON

## (undated) DEVICE — CANNULA, ANTERIOR CHAMBER

## (undated) DEVICE — DRESSING, ABDOMINAL, TENDERSORB, 8 X 10 IN, STERILE

## (undated) DEVICE — APPLIER,  LIGACLIP MULTI CLIP, 30 MED 11 1/2

## (undated) DEVICE — COVER, TABLE, 54X90

## (undated) DEVICE — EVACUATOR, WOUND, SUCTION, CLOSED, JACKSON-PRATT, 100 CC, SILICONE

## (undated) DEVICE — DISSECTOR, EXACT, LIGASURE

## (undated) DEVICE — MANIFOLD, 4 PORT NEPTUNE STANDARD

## (undated) DEVICE — APPLIER, LIGACLIP, MULTIPLE, SMALL 9-3/8IN

## (undated) DEVICE — DRAPE, SHEET, FAN FOLDED, HALF, 44 X 58 IN, DISPOSABLE, LF, STERILE

## (undated) DEVICE — CLIP, LIGATING, W/ADHESIVE PAD, MEDIUM, TITANIUM

## (undated) DEVICE — STRIP, SKIN CLOSURE, STERI STRIP, REINFORCED, 0.5 X 4 IN

## (undated) DEVICE — CLEANER, WIPE, INSTRUMENT, 3.25 X 3.25 IN

## (undated) DEVICE — SYRINGE, COLLECTION, ABG, 1CC, LUER LOCK

## (undated) DEVICE — VALVE, BIOPSY, BRONCHOSCOPE, DISPOSABLE, STERILE

## (undated) DEVICE — BLADE, OSCILLATING/SAGITTAL, 25MM X 9MM

## (undated) DEVICE — CATHETER, URETHRAL, PEZZER, 3 CM HEAD, 36 FR, LATEX

## (undated) DEVICE — DRESSING, TRANSPARENT, TEGADERM, 8 X 12 IN

## (undated) DEVICE — COVER, TABLE, 44 X 75 IN, DISPOSABLE, LF, STERILE

## (undated) DEVICE — DRAPE, MICROSCOPE, W/REMOVABLE LENS

## (undated) DEVICE — BOWL, UTILITY, 32 OZ, PLASTIC, STERILE

## (undated) DEVICE — SUTURE, PLAIN, 5-0, 18 IN, PC1, YELLOW

## (undated) DEVICE — GOWN, SURGICAL, SMARTGOWN, XLARGE, STERILE

## (undated) DEVICE — PEG KIT, SAFETY, PULL, 20FR W/ENFIT

## (undated) DEVICE — SHEARS, CURVED 9CM HARMONIC FOCUS

## (undated) DEVICE — GEL, ECOVUE, ULTRASOUND, 20GRAM, STERILE

## (undated) DEVICE — BURR, OVAL MEDIUM, 4.0MM

## (undated) DEVICE — DRAIN, WOUND, FLAT, HUBLESS, FULL LENGTH PERFORATION, 10 MM X 20 CM, SILICONE

## (undated) DEVICE — DRESSING, GAUZE, 16 PLY, 4 X 4 IN, STERILE

## (undated) DEVICE — COUNTER, NEEDLE, FOAM BLOCK, W/MAGNET, W/BLADE GUARD, 10 COUNT, RED, LF

## (undated) DEVICE — SUTURE, PERMA HAND 2-0, TAPER POINT, SH BLACK 8-18 INCH

## (undated) DEVICE — REST, HEAD, BAGEL, 9 IN

## (undated) DEVICE — BOWL, BASIN, 32 OZ, STERILE

## (undated) DEVICE — SPONGE GAUZE, XRAY SC+RFID, 4X4 16 PLY, STERILE

## (undated) DEVICE — DRESSING, MEPILEX, BORDER, SACRUM, 8.7 X 9.8 IN

## (undated) DEVICE — MICROCLIPS, GEM HEMOSTATIC TITANIUM

## (undated) DEVICE — TRAY, SURESTEP, URINE METER, 14FR, SILICONE

## (undated) DEVICE — CLIP, LIGATING, W/ADHESIVE, WIDE SLOT, SMALL, TITANIUM

## (undated) DEVICE — BUR, SOLID OVAL, CARBIDE, MEDIUM, 5.5MM

## (undated) DEVICE — SUTURE, VICRYL PLUS 3-0, SH, 27IN

## (undated) DEVICE — CATHETER, IV, ANGIOCATH, 24 G X 0.75 IN, FEP POLYMER